# Patient Record
Sex: MALE | Race: WHITE | NOT HISPANIC OR LATINO | Employment: FULL TIME | URBAN - NONMETROPOLITAN AREA
[De-identification: names, ages, dates, MRNs, and addresses within clinical notes are randomized per-mention and may not be internally consistent; named-entity substitution may affect disease eponyms.]

---

## 2020-04-06 ENCOUNTER — CONSULT (OUTPATIENT)
Dept: CARDIOLOGY | Facility: CLINIC | Age: 50
End: 2020-04-06

## 2020-04-06 VITALS
OXYGEN SATURATION: 97 % | DIASTOLIC BLOOD PRESSURE: 100 MMHG | BODY MASS INDEX: 30.61 KG/M2 | WEIGHT: 226 LBS | HEIGHT: 72 IN | SYSTOLIC BLOOD PRESSURE: 146 MMHG | HEART RATE: 68 BPM

## 2020-04-06 DIAGNOSIS — R00.1 BRADYCARDIA, SINUS: ICD-10-CM

## 2020-04-06 DIAGNOSIS — E78.5 DYSLIPIDEMIA: ICD-10-CM

## 2020-04-06 DIAGNOSIS — I10 ESSENTIAL HYPERTENSION: Primary | ICD-10-CM

## 2020-04-06 PROCEDURE — 99243 OFF/OP CNSLTJ NEW/EST LOW 30: CPT | Performed by: INTERNAL MEDICINE

## 2020-04-06 RX ORDER — LISINOPRIL 10 MG/1
TABLET ORAL
COMMUNITY
Start: 2020-01-30 | End: 2022-01-23 | Stop reason: ALTCHOICE

## 2020-04-06 RX ORDER — NIACIN 500 MG
500 TABLET ORAL NIGHTLY
COMMUNITY

## 2020-04-06 RX ORDER — ICOSAPENT ETHYL 1000 MG/1
CAPSULE ORAL
COMMUNITY
Start: 2020-01-19

## 2020-04-06 NOTE — PROGRESS NOTES
Subjective:     Encounter Date:04/06/2020      Patient ID: Nick Hancock is a 49 y.o. male.    Chief Complaint: Bradycardia  HPI  This is a 49-year-old male patient who was noted to have mild asymptomatic resting sinus bradycardia with a pulse rate in the 50s at a recent visit with his primary care provider.  He was referred to cardiology.  The patient has no chest discomfort at rest or with activity.  There is no exertional chest arm neck jaw shoulder or back discomfort.  There is no orthopnea PND or lower extremity edema.  There is no dizziness palpitations or syncope.  He has no history of arrhythmia.  He has a history of hypertension and hypercholesterolemia.  He does not appear to have had recent thyroid function testing.  His family history is negative for premature coronary artery disease.  He has no personal history of diabetes.  He is a non-smoker.  The patient indicates that he had a screening treadmill stress test 2 years ago which was normal.  The following portions of the patient's history were reviewed and updated as appropriate: allergies, current medications, past family history, past medical history, past social history, past surgical history and problem  Review of Systems   Constitution: Negative for chills, diaphoresis, fever, malaise/fatigue, weight gain and weight loss.   HENT: Negative for ear discharge, hearing loss, hoarse voice and nosebleeds.    Eyes: Negative for discharge, double vision, pain and photophobia.   Cardiovascular: Negative for chest pain, claudication, cyanosis, dyspnea on exertion, irregular heartbeat, leg swelling, near-syncope, orthopnea, palpitations, paroxysmal nocturnal dyspnea and syncope.   Respiratory: Negative for cough, hemoptysis, shortness of breath, sputum production and wheezing.    Endocrine: Negative for cold intolerance, heat intolerance, polydipsia, polyphagia and polyuria.   Hematologic/Lymphatic: Negative for adenopathy and bleeding problem. Does not  bruise/bleed easily.   Skin: Negative for color change, flushing, itching and rash.   Musculoskeletal: Negative for muscle cramps, muscle weakness, myalgias and stiffness.   Gastrointestinal: Negative for abdominal pain, diarrhea, hematemesis, hematochezia, nausea and vomiting.   Genitourinary: Negative for dysuria, frequency and nocturia.   Neurological: Negative for focal weakness, loss of balance, numbness, paresthesias and seizures.   Psychiatric/Behavioral: Negative for altered mental status, hallucinations and suicidal ideas.   Allergic/Immunologic: Negative for HIV exposure, hives and persistent infections.           Current Outpatient Medications:   •  lisinopril (PRINIVIL,ZESTRIL) 10 MG tablet, , Disp: , Rfl:   •  niacin 500 MG tablet, Take 500 mg by mouth Every Night., Disp: , Rfl:   •  VASCEPA 1 g capsule capsule, , Disp: , Rfl:     Objective:   Physical Exam   Constitutional: He is oriented to person, place, and time. He appears well-developed and well-nourished. No distress.   HENT:   Head: Normocephalic and atraumatic.   Mouth/Throat: Oropharynx is clear and moist.   Eyes: Pupils are equal, round, and reactive to light. Conjunctivae and EOM are normal. No scleral icterus.   Neck: Normal range of motion. Neck supple. No JVD present. No tracheal deviation present. No thyromegaly present.   Cardiovascular: Normal rate, regular rhythm, S1 normal, S2 normal, normal heart sounds, intact distal pulses and normal pulses. PMI is not displaced. Exam reveals no gallop and no friction rub.   No murmur heard.  Pulmonary/Chest: Effort normal and breath sounds normal. No stridor. No respiratory distress. He has no wheezes. He has no rales.   Abdominal: Soft. Bowel sounds are normal. He exhibits no distension and no mass. There is no tenderness. There is no rebound and no guarding.   Musculoskeletal: Normal range of motion. He exhibits no edema or deformity.   Neurological: He is alert and oriented to person, place,  "and time. He displays normal reflexes. No cranial nerve deficit. Coordination normal.   Skin: Skin is warm and dry. No rash noted. He is not diaphoretic. No erythema.   Psychiatric: He has a normal mood and affect. His behavior is normal. Thought content normal.     Blood pressure 146/100, pulse 68, height 182.9 cm (72\"), weight 103 kg (226 lb), SpO2 97 %.   Lab Review:     Assessment:       1. Essential hypertension  His blood pressure is elevated at today's visit.  The patient indicates that he is somewhat anxious about this evaluation.  He indicates that his blood pressures have been well controlled otherwise.    2. Dyslipidemia  He is on combination icosapent ethyl as well as Niaspan for elevated triglycerides.  His last fasting serum triglycerides were 370 mg/dL.  - Thyroid Panel With TSH; Future    3. Bradycardia, sinus  Resting, asymptomatic, mild sinus bradycardia.  Not currently on any medications that would lead to bradycardia.  - Holter Monitor - 24 Hour  - Thyroid Panel With TSH; Future    Procedures    Plan:     I have recommended a 24-hour Holter monitor as well as thyroid function testing.  No changes in his medication therapy have been made at today's visit.  Further recommendations will be predicated on the results of his outpatient testing.      "

## 2020-04-13 ENCOUNTER — TELEPHONE (OUTPATIENT)
Dept: CARDIOLOGY | Facility: CLINIC | Age: 50
End: 2020-04-13

## 2020-06-02 ENCOUNTER — OFFICE VISIT (OUTPATIENT)
Dept: PULMONOLOGY | Facility: CLINIC | Age: 50
End: 2020-06-02

## 2020-06-02 VITALS
RESPIRATION RATE: 18 BRPM | SYSTOLIC BLOOD PRESSURE: 126 MMHG | HEIGHT: 72 IN | DIASTOLIC BLOOD PRESSURE: 72 MMHG | HEART RATE: 61 BPM | TEMPERATURE: 97.1 F | OXYGEN SATURATION: 98 % | WEIGHT: 218 LBS | BODY MASS INDEX: 29.53 KG/M2

## 2020-06-02 DIAGNOSIS — G47.33 OBSTRUCTIVE SLEEP APNEA: Primary | ICD-10-CM

## 2020-06-02 DIAGNOSIS — G47.19 EXCESSIVE DAYTIME SLEEPINESS: ICD-10-CM

## 2020-06-02 DIAGNOSIS — R06.83 SNORING: ICD-10-CM

## 2020-06-02 PROCEDURE — 99244 OFF/OP CNSLTJ NEW/EST MOD 40: CPT | Performed by: INTERNAL MEDICINE

## 2020-06-02 NOTE — PROGRESS NOTES
CONSULT NOTE    Requested by:   Alicia Devlin PA Dicks, Clarice Y., PA      Chief Complaint   Patient presents with   • Consult   • Sleeping Problem       Subjective:  Nick Hancock is a 49 y.o. male.     History of Present Illness   Patient came in today for evaluation of possible sleep apnea. Patient says that for the past few years he snores loudly and has woken up in the middle of the night gasping for breath and sometimes with a choking sensation. Also, the patient's family notes that he has occasional pauses in the breathing.     he feels that he doesn't get restful night sleep and his quality has diminished considerably. he does feel sleepy watching TV and reading a book.      he is not complaining of occasional headaches. Patient mentions having rare nights when he has acted out his dreams, but hasn't happened in many years.     There is no known family history of sleep apnea     his Epsworth Sleepiness score was 11 /24.     Patient suffers from hypertension.     he drinks 1-2 cups/cans of caffeinated drinks per day.    The following portions of the patient's history were reviewed and updated as appropriate: allergies, current medications, past family history, past medical history, past social history and past surgical history.    Review of Systems   Constitutional: Negative for chills, fatigue and fever.   HENT: Negative for sinus pressure, sneezing and sore throat.    Respiratory: Negative for cough, chest tightness, shortness of breath and wheezing.    Cardiovascular: Negative for palpitations and leg swelling.   Psychiatric/Behavioral: Positive for sleep disturbance.   All other systems reviewed and are negative.      Past Medical History:   Diagnosis Date   • Diverticulitis    • Hyperlipidemia        Social History     Tobacco Use   • Smoking status: Former Smoker     Last attempt to quit: 4/6/2005     Years since quitting: 15.1   • Smokeless tobacco: Current User   Substance Use Topics   •  "Alcohol use: Yes     Comment: Socially         Objective:  Visit Vitals  /72   Pulse 61   Temp 97.1 °F (36.2 °C)   Resp 18   Ht 182.9 cm (72.01\")   Wt 98.9 kg (218 lb)   SpO2 98%   BMI 29.56 kg/m²       Physical Exam   Constitutional: He is oriented to person, place, and time. He appears well-developed and well-nourished.   HENT:   Head: Atraumatic.   Crowded Oropharynx.    Eyes: Pupils are equal, round, and reactive to light. EOM are normal.   Neck: No JVD present. No tracheal deviation present. No thyromegaly present.   Increased adipose tissue.    Cardiovascular: Normal rate and regular rhythm.   Pulmonary/Chest: Effort normal and breath sounds normal. No respiratory distress. He has no wheezes.   Musculoskeletal: Normal range of motion. He exhibits no edema.   Gait was normal.   Neurological: He is alert and oriented to person, place, and time.   Skin: Skin is warm and dry.   Psychiatric: He has a normal mood and affect. His behavior is normal.   Vitals reviewed.      Assessment/Plan:  Nick was seen today for consult and sleeping problem.    Diagnoses and all orders for this visit:    Obstructive sleep apnea  -     Home Sleep Study; Future    Snoring  -     Home Sleep Study; Future    Excessive daytime sleepiness  -     Home Sleep Study; Future        Return in about 4 months (around 10/2/2020) for Alexus/Radha.    DISCUSSION(if any):  Sleep questionnaire was reviewed with the patient.    The pathophysiology of sleep apnea was discussed, with the patient.     We will encourage him to schedule the sleep study soon.     The patient was made aware of the limitation of the home sleep study, whereby it may underestimate the true AHI and also carries a low sensitivity.  I have informed him that even if the home sleep study is negative, we may suggest an in lab sleep study to completely and definitively rule out/in sleep apnea.  The patient has understood.  This was communicated to the patient, in case " home study is to be requested.    The patient is agreeable to try CPAP/BiPAP, if needed.     Patient was educated on good sleep hygiene measures and voiced understanding of the same.     Patient was given reading material regarding sleep apnea.      Dictated utilizing Dragon dictation.    This document was electronically signed by Faiza Gomez MD on 06/02/20 at 12:09

## 2020-06-10 ENCOUNTER — HOSPITAL ENCOUNTER (OUTPATIENT)
Dept: SLEEP MEDICINE | Facility: HOSPITAL | Age: 50
Discharge: HOME | End: 2020-06-10
Admitting: INTERNAL MEDICINE

## 2020-06-10 DIAGNOSIS — R06.83 SNORING: ICD-10-CM

## 2020-06-10 DIAGNOSIS — G47.33 OBSTRUCTIVE SLEEP APNEA: ICD-10-CM

## 2020-06-10 DIAGNOSIS — G47.19 EXCESSIVE DAYTIME SLEEPINESS: ICD-10-CM

## 2020-06-10 PROCEDURE — 95806 SLEEP STUDY UNATT&RESP EFFT: CPT | Performed by: INTERNAL MEDICINE

## 2020-06-10 PROCEDURE — 95806 SLEEP STUDY UNATT&RESP EFFT: CPT

## 2020-06-15 DIAGNOSIS — G47.33 OSA (OBSTRUCTIVE SLEEP APNEA): Primary | ICD-10-CM

## 2020-10-07 ENCOUNTER — OFFICE VISIT (OUTPATIENT)
Dept: PULMONOLOGY | Facility: CLINIC | Age: 50
End: 2020-10-07

## 2020-10-07 VITALS
HEART RATE: 65 BPM | WEIGHT: 224 LBS | TEMPERATURE: 97.3 F | BODY MASS INDEX: 30.34 KG/M2 | SYSTOLIC BLOOD PRESSURE: 140 MMHG | DIASTOLIC BLOOD PRESSURE: 90 MMHG | HEIGHT: 72 IN | RESPIRATION RATE: 18 BRPM | OXYGEN SATURATION: 96 %

## 2020-10-07 DIAGNOSIS — R06.83 SNORING: ICD-10-CM

## 2020-10-07 DIAGNOSIS — G47.33 OSA (OBSTRUCTIVE SLEEP APNEA): Primary | ICD-10-CM

## 2020-10-07 DIAGNOSIS — G47.19 EXCESSIVE DAYTIME SLEEPINESS: ICD-10-CM

## 2020-10-07 PROCEDURE — 99213 OFFICE O/P EST LOW 20 MIN: CPT | Performed by: NURSE PRACTITIONER

## 2020-10-07 NOTE — PROGRESS NOTES
"Chief Complaint   Patient presents with   • Follow-up   • Sleeping Problem         Subjective   Nick Hancock is a 49 y.o. male.     History of Present Illness   The patient comes in today for follow-up of obstructive sleep apnea.    I reviewed his sleep study and discussed the results with him today.  The sleep study revealed moderate sleep apnea with an apnea hypopnea index of 19 per hour.  His apnea-hypopnea index was worse in supine position at 32 per hour.    He has been set up with AutoPAP at a pressure of 6/14.  He has had some difficulty using the machine on a regular basis due to nasal congestion.  He is using an over-the-counter sinus nasal spray which helps but he does not want to use this on a regular basis because it dries his nose out.      The following portions of the patient's history were reviewed and updated as appropriate: allergies, current medications, past family history, past medical history, past social history and past surgical history.      Review of Systems   Constitutional: Negative for chills and fever.   HENT: Positive for rhinorrhea, sinus pressure and sneezing. Negative for sore throat.    Respiratory: Negative for cough, chest tightness, shortness of breath and wheezing.    Psychiatric/Behavioral: Negative for sleep disturbance.       Objective   Visit Vitals  /90   Pulse 65   Temp 97.3 °F (36.3 °C)   Resp 18   Ht 182.9 cm (72\")   Wt 102 kg (224 lb)   SpO2 96%   BMI 30.38 kg/m²         Physical Exam  Vitals signs reviewed.   Constitutional:       Appearance: He is well-developed.   HENT:      Head: Atraumatic.      Mouth/Throat:      Comments: Crowded oropharynx. Mild drainage noted.   Musculoskeletal:      Comments: Gait was normal.   Skin:     General: Skin is warm.   Neurological:      Mental Status: He is alert and oriented to person, place, and time.           Assessment/Plan   Nick was seen today for follow-up and sleeping problem.    Diagnoses and all orders for " this visit:    KIMO (obstructive sleep apnea)    Excessive daytime sleepiness    Snoring           Return in about 4 months (around 2/7/2021) for Recheck, For Dr. Gomez, Sleep ONLY.    DISCUSSION (if any):  Continue treatment with AutoPAP at a pressure of 6/14, with a nasal mask.    Patient's compliance data was reviewed and he is not compliant. We have discussed the importance of using the machine every night for at least 4 hours.     Humidification setup, hose and mask care discussed.    Weight loss advised.      Dictated utilizing Dragon dictation.    This document was electronically signed by GORDY Payan October 7, 2020  16:28 EDT

## 2021-02-10 ENCOUNTER — OFFICE VISIT (OUTPATIENT)
Dept: PULMONOLOGY | Facility: CLINIC | Age: 51
End: 2021-02-10

## 2021-02-10 VITALS
RESPIRATION RATE: 18 BRPM | TEMPERATURE: 97.1 F | DIASTOLIC BLOOD PRESSURE: 74 MMHG | BODY MASS INDEX: 30.88 KG/M2 | SYSTOLIC BLOOD PRESSURE: 128 MMHG | HEART RATE: 67 BPM | HEIGHT: 72 IN | WEIGHT: 228 LBS | OXYGEN SATURATION: 96 %

## 2021-02-10 DIAGNOSIS — G47.33 OSA (OBSTRUCTIVE SLEEP APNEA): Primary | ICD-10-CM

## 2021-02-10 PROCEDURE — 99213 OFFICE O/P EST LOW 20 MIN: CPT | Performed by: INTERNAL MEDICINE

## 2021-02-10 NOTE — PROGRESS NOTES
"  Chief Complaint   Patient presents with   • Follow-up   • Sleeping Problem         Subjective   Nick Hancock is a 50 y.o. male.     History of Present Illness   Patient was evaluated today for follow up of Sleep apnea.     Patient doesn't report any issues with the PAP device. The patient describes no significant issues with his mask either. Patient says that the compliance with the use of the equipment is good.     Patient says that his symptoms of fatigue & daytime sleepiness have been helped greatly with the use of PAP, as prescribed.     The following portions of the patient's history were reviewed and updated as appropriate: allergies, current medications, past family history, past medical history, past social history and past surgical history.    Review of Systems   HENT: Negative for sinus pressure, sneezing and sore throat.    Respiratory: Negative for cough, chest tightness, shortness of breath and wheezing.        Objective   Visit Vitals  /74   Pulse 67   Temp 97.1 °F (36.2 °C)   Resp 18   Ht 182.9 cm (72.01\")   Wt 103 kg (228 lb)   SpO2 96%   BMI 30.92 kg/m²       Physical Exam  Vitals signs reviewed.   Constitutional:       Appearance: He is well-developed.   HENT:      Head: Atraumatic.      Mouth/Throat:      Comments: Oropharynx was crowded.  Musculoskeletal:      Comments: Gait was normal.   Neurological:      Mental Status: He is alert and oriented to person, place, and time.         Assessment/Plan   Diagnoses and all orders for this visit:    1. KIMO (obstructive sleep apnea) (Primary)           Return in about 13 months (around 3/10/2022) for SleepONLY/Radha.    DISCUSSION (if any):  I reviewed the results of last sleep study in detail. I informed him that the apnea hypopnea index was 18 / hr. Supine AHI was 32/hour. This was a home study. This was done in June 2020.    Continue treatment with AutoPAP at a pressure of 6/14, with nasal pillows.    Patient seems to be compliant with PAP " device, based on the available data and his account of improved symptoms.     Compliance data was obtained from the patient's device/DME company and it was reviewed in detail with the patient.    Sleep hygiene measures were discussed in great detail including need to follow a strict bedtime and to avoid electronic devices in bed and close to bedtime.    We also discussed the need to avoid unscheduled naps as that can have adverse effects on sleep efficiency at night.    he was also asked to avoid caffeinated or alcoholic beverages within 4 to 6 hours of bedtime.    The patient was once again reminded to continue using the PAP device regularly, every night for atleast 4 hours.    I spent a total of 22 minutes face to face with this patient. Part of this time was spent in counseling patient about the pathophysiology of underlying disease process, reviewing any available sleep studies, need to use devices (as applicable) on a regular basis and lifestyle modifications that may positively impact patient's health.  Time also includes documentation in electronic health records.        Dictated utilizing Dragon dictation.    This document was electronically signed by Faiza Gomez MD on 02/10/21 at 12:33 EST

## 2021-09-20 ENCOUNTER — APPOINTMENT (OUTPATIENT)
Dept: CT IMAGING | Facility: HOSPITAL | Age: 51
End: 2021-09-20

## 2021-09-20 ENCOUNTER — HOSPITAL ENCOUNTER (EMERGENCY)
Facility: HOSPITAL | Age: 51
Discharge: HOME | End: 2021-09-20
Attending: EMERGENCY MEDICINE | Admitting: EMERGENCY MEDICINE

## 2021-09-20 VITALS
HEART RATE: 74 BPM | DIASTOLIC BLOOD PRESSURE: 92 MMHG | RESPIRATION RATE: 16 BRPM | TEMPERATURE: 98.9 F | WEIGHT: 223 LBS | BODY MASS INDEX: 30.2 KG/M2 | SYSTOLIC BLOOD PRESSURE: 145 MMHG | HEIGHT: 72 IN | OXYGEN SATURATION: 99 %

## 2021-09-20 DIAGNOSIS — K57.92 DIVERTICULITIS: Primary | ICD-10-CM

## 2021-09-20 LAB
ALBUMIN SERPL-MCNC: 4.7 G/DL (ref 3.5–5.2)
ALBUMIN/GLOB SERPL: 1.6 G/DL
ALP SERPL-CCNC: 76 U/L (ref 39–117)
ALT SERPL W P-5'-P-CCNC: 19 U/L (ref 1–41)
ANION GAP SERPL CALCULATED.3IONS-SCNC: 11.2 MMOL/L (ref 5–15)
AST SERPL-CCNC: 20 U/L (ref 1–40)
BACTERIA UR QL AUTO: ABNORMAL /HPF
BASOPHILS # BLD AUTO: 0.06 10*3/MM3 (ref 0–0.2)
BASOPHILS NFR BLD AUTO: 0.4 % (ref 0–1.5)
BILIRUB SERPL-MCNC: 2.4 MG/DL (ref 0–1.2)
BILIRUB UR QL STRIP: NEGATIVE
BUN SERPL-MCNC: 16 MG/DL (ref 6–20)
BUN/CREAT SERPL: 16.2 (ref 7–25)
CALCIUM SPEC-SCNC: 9.4 MG/DL (ref 8.6–10.5)
CHLORIDE SERPL-SCNC: 99 MMOL/L (ref 98–107)
CLARITY UR: CLEAR
CO2 SERPL-SCNC: 26.8 MMOL/L (ref 22–29)
COLOR UR: YELLOW
CREAT SERPL-MCNC: 0.99 MG/DL (ref 0.76–1.27)
CRP SERPL-MCNC: 11.03 MG/DL (ref 0–0.5)
DEPRECATED RDW RBC AUTO: 44.2 FL (ref 37–54)
EOSINOPHIL # BLD AUTO: 0.14 10*3/MM3 (ref 0–0.4)
EOSINOPHIL NFR BLD AUTO: 1 % (ref 0.3–6.2)
ERYTHROCYTE [DISTWIDTH] IN BLOOD BY AUTOMATED COUNT: 13.4 % (ref 12.3–15.4)
ERYTHROCYTE [SEDIMENTATION RATE] IN BLOOD: 29 MM/HR (ref 0–15)
GFR SERPL CREATININE-BSD FRML MDRD: 79 ML/MIN/1.73
GLOBULIN UR ELPH-MCNC: 2.9 GM/DL
GLUCOSE SERPL-MCNC: 98 MG/DL (ref 65–99)
GLUCOSE UR STRIP-MCNC: NEGATIVE MG/DL
HCT VFR BLD AUTO: 41.2 % (ref 37.5–51)
HGB BLD-MCNC: 14.2 G/DL (ref 13–17.7)
HGB UR QL STRIP.AUTO: ABNORMAL
HYALINE CASTS UR QL AUTO: ABNORMAL /LPF
IMM GRANULOCYTES # BLD AUTO: 0.04 10*3/MM3 (ref 0–0.05)
IMM GRANULOCYTES NFR BLD AUTO: 0.3 % (ref 0–0.5)
KETONES UR QL STRIP: NEGATIVE
LEUKOCYTE ESTERASE UR QL STRIP.AUTO: NEGATIVE
LIPASE SERPL-CCNC: 31 U/L (ref 13–60)
LYMPHOCYTES # BLD AUTO: 2.11 10*3/MM3 (ref 0.7–3.1)
LYMPHOCYTES NFR BLD AUTO: 15.6 % (ref 19.6–45.3)
MCH RBC QN AUTO: 30.7 PG (ref 26.6–33)
MCHC RBC AUTO-ENTMCNC: 34.5 G/DL (ref 31.5–35.7)
MCV RBC AUTO: 89.2 FL (ref 79–97)
MONOCYTES # BLD AUTO: 1.16 10*3/MM3 (ref 0.1–0.9)
MONOCYTES NFR BLD AUTO: 8.6 % (ref 5–12)
NEUTROPHILS NFR BLD AUTO: 10.04 10*3/MM3 (ref 1.7–7)
NEUTROPHILS NFR BLD AUTO: 74.1 % (ref 42.7–76)
NITRITE UR QL STRIP: NEGATIVE
NRBC BLD AUTO-RTO: 0 /100 WBC (ref 0–0.2)
PH UR STRIP.AUTO: <=5 [PH] (ref 5–8)
PLATELET # BLD AUTO: 223 10*3/MM3 (ref 140–450)
PMV BLD AUTO: 10.3 FL (ref 6–12)
POTASSIUM SERPL-SCNC: 3.7 MMOL/L (ref 3.5–5.2)
PROT SERPL-MCNC: 7.6 G/DL (ref 6–8.5)
PROT UR QL STRIP: ABNORMAL
RBC # BLD AUTO: 4.62 10*6/MM3 (ref 4.14–5.8)
RBC # UR: ABNORMAL /HPF
REF LAB TEST METHOD: ABNORMAL
SODIUM SERPL-SCNC: 137 MMOL/L (ref 136–145)
SP GR UR STRIP: 1.02 (ref 1–1.03)
SQUAMOUS #/AREA URNS HPF: ABNORMAL /HPF
UROBILINOGEN UR QL STRIP: ABNORMAL
WBC # BLD AUTO: 13.55 10*3/MM3 (ref 3.4–10.8)
WBC UR QL AUTO: ABNORMAL /HPF

## 2021-09-20 PROCEDURE — 96374 THER/PROPH/DIAG INJ IV PUSH: CPT

## 2021-09-20 PROCEDURE — 25010000002 KETOROLAC TROMETHAMINE PER 15 MG: Performed by: PHYSICIAN ASSISTANT

## 2021-09-20 PROCEDURE — 25010000002 IOPAMIDOL 61 % SOLUTION: Performed by: EMERGENCY MEDICINE

## 2021-09-20 PROCEDURE — 85651 RBC SED RATE NONAUTOMATED: CPT | Performed by: PHYSICIAN ASSISTANT

## 2021-09-20 PROCEDURE — 74177 CT ABD & PELVIS W/CONTRAST: CPT

## 2021-09-20 PROCEDURE — 96361 HYDRATE IV INFUSION ADD-ON: CPT

## 2021-09-20 PROCEDURE — 86140 C-REACTIVE PROTEIN: CPT | Performed by: PHYSICIAN ASSISTANT

## 2021-09-20 PROCEDURE — 83690 ASSAY OF LIPASE: CPT | Performed by: PHYSICIAN ASSISTANT

## 2021-09-20 PROCEDURE — 96376 TX/PRO/DX INJ SAME DRUG ADON: CPT

## 2021-09-20 PROCEDURE — 85025 COMPLETE CBC W/AUTO DIFF WBC: CPT | Performed by: PHYSICIAN ASSISTANT

## 2021-09-20 PROCEDURE — 25010000002 MORPHINE PER 10 MG: Performed by: EMERGENCY MEDICINE

## 2021-09-20 PROCEDURE — 80053 COMPREHEN METABOLIC PANEL: CPT | Performed by: PHYSICIAN ASSISTANT

## 2021-09-20 PROCEDURE — 99283 EMERGENCY DEPT VISIT LOW MDM: CPT

## 2021-09-20 PROCEDURE — 81001 URINALYSIS AUTO W/SCOPE: CPT | Performed by: PHYSICIAN ASSISTANT

## 2021-09-20 PROCEDURE — 96375 TX/PRO/DX INJ NEW DRUG ADDON: CPT

## 2021-09-20 RX ORDER — AMOXICILLIN AND CLAVULANATE POTASSIUM 875; 125 MG/1; MG/1
1 TABLET, FILM COATED ORAL 2 TIMES DAILY
Qty: 20 TABLET | Refills: 0 | Status: SHIPPED | OUTPATIENT
Start: 2021-09-20 | End: 2021-09-30

## 2021-09-20 RX ORDER — AMOXICILLIN AND CLAVULANATE POTASSIUM 875; 125 MG/1; MG/1
1 TABLET, FILM COATED ORAL ONCE
Status: COMPLETED | OUTPATIENT
Start: 2021-09-20 | End: 2021-09-20

## 2021-09-20 RX ORDER — ONDANSETRON 4 MG/1
4 TABLET, ORALLY DISINTEGRATING ORAL EVERY 8 HOURS PRN
Qty: 9 TABLET | Refills: 0 | Status: SHIPPED | OUTPATIENT
Start: 2021-09-20 | End: 2021-09-23

## 2021-09-20 RX ORDER — KETOROLAC TROMETHAMINE 30 MG/ML
30 INJECTION, SOLUTION INTRAMUSCULAR; INTRAVENOUS EVERY 6 HOURS PRN
Status: DISCONTINUED | OUTPATIENT
Start: 2021-09-20 | End: 2021-09-20 | Stop reason: HOSPADM

## 2021-09-20 RX ORDER — HYDROCODONE BITARTRATE AND ACETAMINOPHEN 5; 325 MG/1; MG/1
1 TABLET ORAL EVERY 6 HOURS PRN
Qty: 10 TABLET | Refills: 0 | OUTPATIENT
Start: 2021-09-20 | End: 2022-01-23

## 2021-09-20 RX ORDER — MORPHINE SULFATE 4 MG/ML
4 INJECTION, SOLUTION INTRAMUSCULAR; INTRAVENOUS ONCE
Status: COMPLETED | OUTPATIENT
Start: 2021-09-20 | End: 2021-09-20

## 2021-09-20 RX ADMIN — MORPHINE SULFATE 4 MG: 4 INJECTION, SOLUTION INTRAMUSCULAR; INTRAVENOUS at 21:23

## 2021-09-20 RX ADMIN — IOPAMIDOL 100 ML: 612 INJECTION, SOLUTION INTRAVENOUS at 19:28

## 2021-09-20 RX ADMIN — AMOXICILLIN AND CLAVULANATE POTASSIUM 1 TABLET: 875; 125 TABLET, FILM COATED ORAL at 21:22

## 2021-09-20 RX ADMIN — KETOROLAC TROMETHAMINE 30 MG: 30 INJECTION, SOLUTION INTRAMUSCULAR at 19:07

## 2021-09-20 RX ADMIN — MORPHINE SULFATE 4 MG: 4 INJECTION, SOLUTION INTRAMUSCULAR; INTRAVENOUS at 19:55

## 2021-09-20 RX ADMIN — SODIUM CHLORIDE 1000 ML: 9 INJECTION, SOLUTION INTRAVENOUS at 19:08

## 2021-09-20 NOTE — ED NOTES
MEDICAL SCREENING:    Reason for Visit: Left lower quadrant abdominal pain, hx of diverticulitis.     Patient initially seen in triage.  The patient was advised further evaluation and diagnostic testing will be needed, some of the treatment and testing will be initiated in the lobby in order to begin the process.  The patient will be returned to the waiting area for the time being and possibly be re-assessed by a subsequent ED provider.  The patient will be brought back to the treatment area in as timely manner as possible.       Michele Lanier PA-C  09/20/21 1828

## 2021-09-20 NOTE — ED PROVIDER NOTES
Subjective   Patient is a 50-year-old male with a history of diverticulitis, hypertension, and hyperlipidemia who presents to the ED with 1 day of left lower quadrant pain.  Started insidiously yesterday and has progressively worsened since that time.  It is worse with movement and walking.  He is only able to get comfortable when he sits very still.  He took some aspirin with his lunch which did temporarily help his pain but returned shortly thereafter and this prompted his presentation to the ER.  His last flare of diverticulitis was 1 to 2 years ago and he was treated as an outpatient with antibiotics.  He denies any fever, diarrhea, bloody stools, nausea, or vomiting.  He denies any dysuria or hematuria.          Review of Systems   Constitutional: Negative.    HENT: Negative.    Eyes: Negative.    Respiratory: Negative.    Cardiovascular: Negative.    Gastrointestinal: Positive for abdominal pain.   Endocrine: Negative.    Genitourinary: Negative.    Musculoskeletal: Negative.    Skin: Negative.    Allergic/Immunologic: Negative.    Neurological: Negative.    Hematological: Negative.    Psychiatric/Behavioral: Negative.        Past Medical History:   Diagnosis Date   • Diverticulitis    • Hyperlipidemia    • Hypertension        No Known Allergies    Past Surgical History:   Procedure Laterality Date   • EYE SURGERY     • SKIN SURGERY      removed abnormal cells       Family History   Problem Relation Age of Onset   • Cancer Mother    • Heart attack Mother    • Hyperlipidemia Father    • Stroke Father    • Hypertension Brother        Social History     Socioeconomic History   • Marital status: Unknown     Spouse name: Not on file   • Number of children: Not on file   • Years of education: Not on file   • Highest education level: Not on file   Tobacco Use   • Smoking status: Former Smoker     Quit date: 2005     Years since quittin.4   • Smokeless tobacco: Current User     Types: Snuff   Vaping Use    • Vaping Use: Never used   Substance and Sexual Activity   • Alcohol use: Yes     Comment: Socially   • Drug use: Never   • Sexual activity: Defer           Objective   Physical Exam  Constitutional:       Appearance: Normal appearance.   HENT:      Head: Normocephalic.      Right Ear: External ear normal.      Left Ear: External ear normal.      Nose: Nose normal.      Mouth/Throat:      Mouth: Mucous membranes are moist.   Eyes:      Extraocular Movements: Extraocular movements intact.   Cardiovascular:      Rate and Rhythm: Normal rate.   Pulmonary:      Effort: Pulmonary effort is normal.   Abdominal:      General: Abdomen is flat.      Palpations: Abdomen is soft.      Tenderness: There is abdominal tenderness in the left lower quadrant. There is guarding.      Hernia: No hernia is present.   Musculoskeletal:         General: Normal range of motion.      Cervical back: Normal range of motion.   Skin:     General: Skin is dry.   Neurological:      General: No focal deficit present.      Mental Status: He is alert and oriented to person, place, and time.   Psychiatric:         Mood and Affect: Mood normal.         Behavior: Behavior normal.         Procedures           ED Course                                           MDM  Number of Diagnoses or Management Options  Diverticulitis  Diagnosis management comments: Patient is a 50-year-old male with a history of diverticulitis, hypertension, and hyperlipidemia who presents to the ED with 1 day of insidious onset and now worsening left lower quadrant pain.  On arrival, he is hypertensive at 190/114 but afebrile.  He has tenderness to palpation in the left lower quadrant with voluntary guarding.  Differential considered included diverticulitis including complicated cases with abscess and perforation, constipation, and to a lesser extent urological causes of pain.  Work-up included basic labs, inflammatory markers, urinalysis, and CT abdomen pelvis with IV  contrast.  While in the ED, he received a fluid bolus and IV Toradol without significant improvement on reevaluation.  He was given IV morphine.  Labs and imaging independently reviewed and were pertinent for mild leukocytosis and a CT scan that showed acute diverticulitis without perforation or abscess.  On reevaluation, patient was asleep, resting comfortably, and was requesting to go home.  Given that he is tolerating oral intake and pain is well controlled at this time, I feel this is reasonable.  He will be started on Augmentin with his first dose given in the ED and prescribed Norco after Rafa checked.  He was given return precautions and verbalized understanding.      Final diagnoses:   Diverticulitis       ED Disposition  ED Disposition     ED Disposition Condition Comment    Discharge Stable           Alicia Devlin, PA  [address]  [address]  [place] [id number] [phone]               Medication List      New Prescriptions    amoxicillin-clavulanate 875-125 MG per tablet  Commonly known as: AUGMENTIN  Take 1 tablet by mouth 2 (Two) Times a Day for 10 days.     HYDROcodone-acetaminophen 5-325 MG per tablet  Commonly known as: NORCO  Take 1 tablet by mouth Every 6 (Six) Hours As Needed for Severe Pain .     ondansetron ODT 4 MG disintegrating tablet  Commonly known as: ZOFRAN-ODT  Place 1 tablet on the tongue Every 8 (Eight) Hours As Needed for Nausea for up to 3 days.           Where to Get Your Medications      These medications were sent to [facility] [place], [place] - [address] - [phone]  - [phone] FX  [address], [facility] [place]note 2[place] [address]    Phone: [phone]   · amoxicillin-clavulanate 875-125 MG per tablet  · HYDROcodone-acetaminophen 5-325 MG per tablet  · ondansetron ODT 4 MG disintegrating tablet          Kaela Hernandez PA  09/20/21 2317

## 2021-09-20 NOTE — ED NOTES
MEDICAL SCREENING:    Reason for Visit: Left lower quadrant abdominal pain      Patient initially seen in triage.  The patient was advised further evaluation and diagnostic testing will be needed, some of the treatment and testing will be initiated in the lobby in order to begin the process.  The patient will be returned to the waiting area for the time being and possibly be re-assessed by a subsequent ED provider.  The patient will be brought back to the treatment area in as timely manner as possible.       Michele Lanier PA-C  09/20/21 1827

## 2021-09-21 NOTE — DISCHARGE INSTRUCTIONS
Take the Augmentin twice a day for the next 10 days.  Take the Norco for pain with ibuprofen in between doses.  Take the Zofran as needed for nausea.  Drink plenty of fluids and eat a bland diet.  Return to the ED if you develop worsening pain, fever, or bloody stools.

## 2022-01-23 PROCEDURE — U0004 COV-19 TEST NON-CDC HGH THRU: HCPCS | Performed by: PHYSICIAN ASSISTANT

## 2022-03-10 ENCOUNTER — OFFICE VISIT (OUTPATIENT)
Dept: PULMONOLOGY | Facility: CLINIC | Age: 52
End: 2022-03-10

## 2022-03-10 VITALS
WEIGHT: 228.3 LBS | HEART RATE: 65 BPM | SYSTOLIC BLOOD PRESSURE: 132 MMHG | OXYGEN SATURATION: 97 % | BODY MASS INDEX: 30.92 KG/M2 | HEIGHT: 72 IN | DIASTOLIC BLOOD PRESSURE: 84 MMHG

## 2022-03-10 DIAGNOSIS — G47.19 EXCESSIVE DAYTIME SLEEPINESS: ICD-10-CM

## 2022-03-10 DIAGNOSIS — G47.33 OSA (OBSTRUCTIVE SLEEP APNEA): Primary | ICD-10-CM

## 2022-03-10 PROCEDURE — 99212 OFFICE O/P EST SF 10 MIN: CPT | Performed by: NURSE PRACTITIONER

## 2022-03-10 NOTE — PROGRESS NOTES
"Chief Complaint   Patient presents with   • Follow-up   • Sleep Apnea         Subjective   Nick Hancock is a 51 y.o. male.     History of Present Illness   Patient comes back today for follow up of Obstructive Sleep apnea.     Patient says that he is compliant with his device and using it regularly.    Patient's symptoms of sleep disturbance and daytime sleepiness have been helped greatly with the use of PAP device, as prescribed.     He machine has been recalled and he has registered it and is waiting for a new machine.     The following portions of the patient's history were reviewed and updated as appropriate: allergies, current medications, past family history, past medical history, past social history and past surgical history.      Review of Systems   HENT: Negative for congestion and sore throat.    Respiratory: Negative for cough, chest tightness, shortness of breath and wheezing.        Objective   Visit Vitals  /84 (BP Location: Left arm, Patient Position: Sitting, Cuff Size: Adult)   Pulse 65   Ht 182.9 cm (72\")   Wt 104 kg (228 lb 4.8 oz)   SpO2 97%   BMI 30.96 kg/m²         Physical Exam  Vitals reviewed.   Constitutional:       Appearance: He is well-developed.   HENT:      Head: Atraumatic.      Mouth/Throat:      Comments: Crowded oropharynx.  Eyes:      Extraocular Movements: Extraocular movements intact.   Musculoskeletal:      Comments: Gait normal.   Skin:     General: Skin is warm.   Neurological:      Mental Status: He is alert and oriented to person, place, and time.           Assessment/Plan   Diagnoses and all orders for this visit:    1. KIMO (obstructive sleep apnea) (Primary)    2. Excessive daytime sleepiness           Return in about 1 year (around 3/10/2023) for Recheck, For Dr. Gomez.    DISCUSSION (if any):  I reviewed the results of last sleep study in detail. I informed him that the apnea hypopnea index was 18 / hr. Supine AHI was 32/hour. This was a home study. This was " done in June 2020.    Continue treatment with AutoPAP at a pressure of 6/14, with a nasal mask.    Patient's compliance data was reviewed and the compliance is greater than 80%.    Humidification setup, hose and mask care discussed.    Weight loss advised.    Use every night for at least 4 hours stressed.    He has registered his machine. I have suggested using an inline filter with the current machine.    Dictated utilizing Dragon dictation.    This document was electronically signed by GORDY Payan March 10, 2022  13:30 EST

## 2022-08-23 ENCOUNTER — OFFICE VISIT (OUTPATIENT)
Dept: GASTROENTEROLOGY | Facility: CLINIC | Age: 52
End: 2022-08-23

## 2022-08-23 VITALS
WEIGHT: 217 LBS | HEART RATE: 59 BPM | BODY MASS INDEX: 29.39 KG/M2 | TEMPERATURE: 97.3 F | SYSTOLIC BLOOD PRESSURE: 164 MMHG | DIASTOLIC BLOOD PRESSURE: 110 MMHG | HEIGHT: 72 IN

## 2022-08-23 DIAGNOSIS — E78.5 DYSLIPIDEMIA: ICD-10-CM

## 2022-08-23 DIAGNOSIS — Z12.12 ENCOUNTER FOR COLORECTAL CANCER SCREENING: ICD-10-CM

## 2022-08-23 DIAGNOSIS — Z87.19 HISTORY OF DIVERTICULITIS: Primary | ICD-10-CM

## 2022-08-23 DIAGNOSIS — Z12.11 ENCOUNTER FOR COLORECTAL CANCER SCREENING: ICD-10-CM

## 2022-08-23 DIAGNOSIS — R17 ELEVATED BILIRUBIN: ICD-10-CM

## 2022-08-23 PROCEDURE — 99244 OFF/OP CNSLTJ NEW/EST MOD 40: CPT | Performed by: PHYSICIAN ASSISTANT

## 2022-08-23 RX ORDER — SODIUM, POTASSIUM,MAG SULFATES 17.5-3.13G
SOLUTION, RECONSTITUTED, ORAL ORAL
Qty: 354 ML | Refills: 0 | Status: SHIPPED | OUTPATIENT
Start: 2022-08-23 | End: 2022-10-10

## 2022-08-23 RX ORDER — MULTIPLE VITAMINS W/ MINERALS TAB 9MG-400MCG
1 TAB ORAL DAILY
Status: ON HOLD | COMMUNITY
End: 2022-09-26 | Stop reason: SDUPTHER

## 2022-08-23 RX ORDER — SODIUM CHLORIDE 9 MG/ML
30 INJECTION, SOLUTION INTRAVENOUS CONTINUOUS PRN
Status: CANCELLED | OUTPATIENT
Start: 2022-08-23

## 2022-08-23 NOTE — PATIENT INSTRUCTIONS
Gilbert's syndrome likely based on bilirubin elevated      Fiber Foods  It is recommended that you consume 25-45 grams daily.    Fresh & Dried Fruit  Serving Size Fiber (g)    Apples with skin  1 medium 5.0    Apricot  3 medium 1.0    Apricots, dried  4 pieces 2.9    Banana  1 medium 3.9    Blueberries  1 cup 4.2    Cantaloupe, cubes  1 cup 1.3    Figs, dried  2 medium 3.7    Grapefruit  1/2 medium 3.1    Orange, navel  1 medium 3.4    Peach  1 medium 2.0    Peaches, dried  3 pieces 3.2    Pear  1 medium 5.1    Plum  1 medium 1.1    Raisins  1.5 oz box 1.6    Raspberries  1 cup 6.4    Strawberries  1 cup 4.4      Grains, Beans, Nuts & Seeds  Serving Size Fiber (g)    Almonds  1 oz 4.2    Black beans, cooked  1 cup 13.9    Bran cereal  1 cup 19.9    Bread, whole wheat  1 slice 2.0    Brown rice, dry  1 cup 7.9    Cashews  1 oz 1.0    Flax seeds  3 Tbsp. 6.9    Garbanzo beans, cooked  1 cup 5.8    Kidney beans, cooked  1 cup 11.6    Lentils, red cooked  1 cup 13.6    Lima beans, cooked  1 cup 8.6    Oats, rolled dry  1 cup 12.0    Quinoa (seeds) dry  1/4 cup 6.2    Quinoa, cooked  1 cup 8.4    Pasta, whole wheat  1 cup 6.3    Peanuts  1 oz 2.3    Pistachio nuts  1 oz 3.1    Pumpkin seeds  1/4 cup 4.1    Soybeans, cooked  1 cup 8.6    Sunflower seeds  1/4 cup 3.0    Walnuts  1 oz 3.1            Vegetables  Serving Size Fiber (g)    Avocado (fruit)  1 medium 11.8    Beets, cooked  1 cup 2.8    Beet greens  1 cup 4.2    Bok payton, cooked  1 cup 2.8    Broccoli, cooked  1 cup 4.5    [place] sprouts, cooked  1 cup 3.6    Cabbage, cooked  1 cup 4.2    Carrot  1 medium 2.6    Carrot, cooked  1 cup 5.2    Cauliflower, cooked  1 cup 3.4    Mango slaw  1 cup 4.0    Drake greens, cooked  1 cup 2.6    Corn, sweet  1 cup 4.6    Green beans  1 cup 4.0    Celery  1 stalk 1.1    Kale, cooked  1 cup 7.2    Onions, raw  1 cup 2.9    Peas, cooked  1 cup 8.8    Peppers, sweet  1 cup 2.6    Pop corn, air-popped  3 cups 3.6    Potato,  baked w/ skin  1 medium 4.8    Spinach, cooked  1 cup 4.3    Summer squash, cooked  1 cup 2.5    Sweet potato, cooked  1 medium 4.9    Swiss chard, cooked  1 cup 3.7    Tomato  1 medium 1.0    Winter squash, cooked  1 cup 6.2    Zucchini, cooked  1 cup 2.6

## 2022-08-23 NOTE — PROGRESS NOTES
New Patient Consult      Date: 2022   Patient Name: Nick Hancock  MRN: [id number]  : 1970     Primary Care Provider: Alicia Devlin PA  Referring Provider: Claus    Chief Complaint   Patient presents with   • Colon Cancer Screening     History of Present Illness: Nick Hancock is a 51 y.o. male who is here today as a consultation with Gastroenterology for evaluation of Colon Cancer Screening.     His only previous colonoscopy was in  and reported as normal. He has history of diverticulitis, last episode in  of . He had his first episode of diverticulitis in year  and reports 3 episodes since then over the years. No current abdominal pain. No rectal bleeding. BMs are occurring daily. He does eat a high fiber diet and drinks a large amount of water. He is active and exercises regularly. There is no known family history of colon cancer or colon polyps.    No history of liver disease. He has had high cholesterol for several years. He drinks alcohol only occassionally, no history of alcoholism. Labs have shown elevated bilirubin in the past no diagnosis reported.     Subjective      Past Medical History:   Diagnosis Date   • Diverticulitis    • High triglycerides    • Hyperlipidemia    • Hypertension    • Sleep apnea    • Tattoos      Past Surgical History:   Procedure Laterality Date   • COLONOSCOPY     • EYE SURGERY Bilateral     PRK   • SKIN SURGERY      removed abnormal cells     Family History   Problem Relation Age of Onset   • Cancer Mother    • Heart attack Mother    • Hyperlipidemia Father    • Stroke Father    • Hypertension Brother    • Colon cancer Neg Hx      Social History     Socioeconomic History   • Marital status:    Tobacco Use   • Smoking status: Former Smoker     Quit date: 2005     Years since quittin.3   • Smokeless tobacco: Current User     Types: Snuff   Vaping Use   • Vaping Use: Never used   Substance and Sexual Activity   •  Alcohol use: Yes     Comment: Socially   • Drug use: Never   • Sexual activity: Defer     Current Outpatient Medications:   •  lisinopril (PRINIVIL,ZESTRIL) 20 MG tablet, Take 20 mg by mouth See Admin Instructions., Disp: , Rfl:   •  multivitamin with minerals tablet tablet, Take 1 tablet by mouth Daily., Disp: , Rfl:   •  niacin 500 MG tablet, Take 500 mg by mouth Every Night., Disp: , Rfl:   •  VASCEPA 1 g capsule capsule, , Disp: , Rfl:     No Known Allergies    The following portions of the patient's history were reviewed and updated as appropriate: allergies, current medications, past family history, past medical history, past social history, past surgical history and problem list.    Objective     Physical Exam  Vitals reviewed.   Constitutional:       General: He is not in acute distress.     Appearance: Normal appearance. He is well-developed. He is not ill-appearing or diaphoretic.   HENT:      Head: Normocephalic and atraumatic.      Right Ear: External ear normal.      Left Ear: External ear normal.      Nose: Nose normal.      Mouth/Throat:      Comments: Wearing a mask  Eyes:      General: No scleral icterus.        Right eye: No discharge.         Left eye: No discharge.      Conjunctiva/sclera: Conjunctivae normal.   Neck:      Vascular: No JVD.   Cardiovascular:      Rate and Rhythm: Normal rate and regular rhythm.      Heart sounds: Normal heart sounds. No murmur heard.    No friction rub. No gallop.   Pulmonary:      Effort: Pulmonary effort is normal. No respiratory distress.      Breath sounds: Normal breath sounds. No wheezing or rales.   Chest:      Chest wall: No tenderness.   Abdominal:      General: Bowel sounds are normal. There is no distension.      Palpations: Abdomen is soft. There is no mass.      Tenderness: There is no abdominal tenderness. There is no guarding.   Musculoskeletal:         General: No deformity. Normal range of motion.      Cervical back: Normal range of motion.  "  Skin:     General: Skin is warm and dry.      Findings: No erythema or rash.   Neurological:      Mental Status: He is alert and oriented to person, place, and time.      Coordination: Coordination normal.   Psychiatric:         Mood and Affect: Mood normal.         Behavior: Behavior normal.         Thought Content: Thought content normal.         Judgment: Judgment normal.         Vitals:    08/23/22 0953   BP: (!) 164/110   Pulse: 59   Temp: 97.3 °F (36.3 °C)   TempSrc: Infrared   Weight: 98.4 kg (217 lb)   Height: 182.9 cm (72\")     Results Review:   I have reviewed the patient's new clinical and imaging results.    CTAP with contrast 9/2021:  FINDINGS:  LOWER CHEST: The heart is normal size.  The lung bases are  clear.  ABDOMEN/PELVIS:  Liver, gallbladder and bile ducts: The  liver enhances homogeneously without suspicious focal hepatic  lesion.  The gallbladder is unremarkable.  There is no definite  biliary duct dilatation.  Adrenal glands: The adrenal glands are  morphologically unremarkable without suspicious lesion.  Kidneys, ureter and urinary bladder: No suspicious renal lesion.  No hydronephrosis.  Urinary bladder is unremarkable.  Spleen:  The spleen is normal size.  Pancreas: The pancreas is grossly  unremarkable.  GI systems and mesentery: No evidence of bowel  obstruction.  The appendix is visualized and unremarkable in  appearance.  There is colonic diverticulosis with significant  colonic wall thickening and surrounding inflammation involving  the distal descending colon.  Lymph nodes: No definite  pathologically enlarged abdominal or pelvic lymph nodes present.  Vessels: The aorta and abdominal arteries are grossly patent.  The IVC and portal vein are patent and grossly unremarkable.  Peritoneum: No free intraperitoneal fluid or pneumoperitoneum.  Pelvic viscera: No acute findings.  Body wall: No body wall  contusion. No significant body wall hernias.  Bones: No " acute  fracture.  IMPRESSION:  Findings are compatible with acute diverticulitis involving the  distal descending colon.    Labs 9/2021: AST/ALT normal, bilirubin 2.4, alk phos normal, CBC normal other than incr WBC at 13.55.     Labs 3/2022: Bili 2.1, alk phos 52, ALT 22, AST 23, GFR 83, Cr 1.03, Hgb 15, HCT 4.2, MCV 90.4, Platelets 265,000, Tchol 160, , LDL 99.    Assessment / Plan      1. History of diverticulitis  2. Encounter for colorectal cancer screening  His last colonoscopy was in 2010 and reported as normal. He does have known history of diverticulitis (reports 4 episodes total since 2000). He has a high fiber diet. No current abdominal pain. Needs colonoscopy arranged for screening.    Continue High fiber diet  Colonoscopy will be arranged    He will have a colonoscopy performed with monitored anesthesia care. The indications, technique, alternatives and potential risk and complications were discussed with the patient including but not limited to bleeding, bowel perforations, missing lesions and anesthetic complications. The patient understands and wishes to proceed with the procedure and has given their verbal consent. Written patient education information was given to the patient. He should follow up in the office after this procedure to discuss the results and further recommendations can be made at that time. The patient will call if they have further questions before procedure.  - Case Request  - sodium-potassium-magnesium sulfates (SUPREP) 17.5-3.13-1.6 GM/177ML solution oral solution; Take 2 bottles by mouth 1 (One) Time for 1 dose. Please see prep instructions from office  Dispense: 354 mL; Refill: 0    3. Dyslipidemia  4. Elevated bilirubin  He has long history of elevated cholesterol and is on current treatment with niacin and Vascepa. His BMI is 29 but he is very physically fit and active. He is a nonalcoholic. No prior history of any liver disease. Labs 9/2021 and 3/2022 show isolate  elevated total bilirubin. This appears most consistent with Gilbert's syndrome.     Continue to work on lowering cholesterol  Maintain normal weight  Labs at PCPs office within next couple of weeks  More recommendations will be made based on that result    - Bilirubin, Total & Direct; Future            Follow Up:   Return for follow up after procedure to discuss results.      Deana Kiser PA-C  [facility]  8/23/2022  10:57 EDT    Dictated Utilizing Dragon Dictation: Part of this note may be an electronic transcription/translation of spoken language to printed text using the Dragon Dictation System.

## 2022-08-29 PROBLEM — Z12.12 ENCOUNTER FOR COLORECTAL CANCER SCREENING: Status: ACTIVE | Noted: 2022-01-01

## 2022-08-29 PROBLEM — Z12.11 ENCOUNTER FOR COLORECTAL CANCER SCREENING: Status: ACTIVE | Noted: 2022-08-29

## 2022-09-17 ENCOUNTER — APPOINTMENT (OUTPATIENT)
Dept: GENERAL RADIOLOGY | Facility: HOSPITAL | Age: 52
End: 2022-09-17

## 2022-09-17 ENCOUNTER — APPOINTMENT (OUTPATIENT)
Dept: MRI IMAGING | Facility: HOSPITAL | Age: 52
End: 2022-09-17

## 2022-09-17 ENCOUNTER — APPOINTMENT (OUTPATIENT)
Dept: CT IMAGING | Facility: HOSPITAL | Age: 52
End: 2022-09-17

## 2022-09-17 ENCOUNTER — HOSPITAL ENCOUNTER (INPATIENT)
Facility: HOSPITAL | Age: 52
LOS: 9 days | Discharge: HOME HEALTH | End: 2022-09-26
Attending: EMERGENCY MEDICINE

## 2022-09-17 DIAGNOSIS — R26.9 GAIT DIFFICULTY: ICD-10-CM

## 2022-09-17 DIAGNOSIS — D33.0 BENIGN NEOPLASM OF SUPRATENTORIAL REGION OF BRAIN: ICD-10-CM

## 2022-09-17 DIAGNOSIS — R47.9 DIFFICULTY WITH SPEECH: ICD-10-CM

## 2022-09-17 DIAGNOSIS — G93.89 BRAIN MASS: Primary | ICD-10-CM

## 2022-09-17 LAB
ALBUMIN SERPL-MCNC: 4.6 G/DL (ref 3.5–5.2)
ALBUMIN/GLOB SERPL: 1.6 G/DL
ALP SERPL-CCNC: 80 U/L (ref 39–117)
ALT SERPL W P-5'-P-CCNC: 13 U/L (ref 1–41)
ALT SERPL W P-5'-P-CCNC: 16 U/L (ref 1–41)
ANION GAP SERPL CALCULATED.3IONS-SCNC: 14 MMOL/L (ref 5–15)
APTT PPP: 77.3 SECONDS (ref 22–39)
AST SERPL-CCNC: 16 U/L (ref 1–40)
AST SERPL-CCNC: 18 U/L (ref 1–40)
BASOPHILS # BLD AUTO: 0.04 10*3/MM3 (ref 0–0.2)
BASOPHILS NFR BLD AUTO: 0.4 % (ref 0–1.5)
BILIRUB SERPL-MCNC: 2.8 MG/DL (ref 0–1.2)
BUN BLDA-MCNC: 16 MG/DL (ref 8–26)
BUN SERPL-MCNC: 16 MG/DL (ref 6–20)
BUN/CREAT SERPL: 19.3 (ref 7–25)
CA-I BLDA-SCNC: 1.21 MMOL/L (ref 1.2–1.32)
CALCIUM SPEC-SCNC: 9.3 MG/DL (ref 8.6–10.5)
CHLORIDE BLDA-SCNC: 99 MMOL/L (ref 98–109)
CHLORIDE SERPL-SCNC: 98 MMOL/L (ref 98–107)
CLUMPED PLATELETS: PRESENT
CO2 BLDA-SCNC: 27 MMOL/L (ref 24–29)
CO2 SERPL-SCNC: 27 MMOL/L (ref 22–29)
CREAT BLDA-MCNC: 0.9 MG/DL (ref 0.6–1.3)
CREAT BLDA-MCNC: 0.9 MG/DL (ref 0.6–1.3)
CREAT SERPL-MCNC: 0.83 MG/DL (ref 0.76–1.27)
DEPRECATED RDW RBC AUTO: 39.8 FL (ref 37–54)
EGFRCR SERPLBLD CKD-EPI 2021: 102.1 ML/MIN/1.73
EGFRCR SERPLBLD CKD-EPI 2021: 104.7 ML/MIN/1.73
EOSINOPHIL # BLD AUTO: 0.04 10*3/MM3 (ref 0–0.4)
EOSINOPHIL NFR BLD AUTO: 0.4 % (ref 0.3–6.2)
ERYTHROCYTE [DISTWIDTH] IN BLOOD BY AUTOMATED COUNT: 12.8 % (ref 12.3–15.4)
GLOBULIN UR ELPH-MCNC: 2.9 GM/DL
GLUCOSE BLDC GLUCOMTR-MCNC: 136 MG/DL (ref 70–130)
GLUCOSE SERPL-MCNC: 142 MG/DL (ref 65–99)
HCT VFR BLD AUTO: 43.8 % (ref 37.5–51)
HCT VFR BLDA CALC: 45 % (ref 38–51)
HGB BLD-MCNC: 15.7 G/DL (ref 13–17.7)
HGB BLDA-MCNC: 15.3 G/DL (ref 12–17)
HOLD SPECIMEN: NORMAL
IMM GRANULOCYTES # BLD AUTO: 0.02 10*3/MM3 (ref 0–0.05)
IMM GRANULOCYTES NFR BLD AUTO: 0.2 % (ref 0–0.5)
LYMPHOCYTES # BLD AUTO: 1.51 10*3/MM3 (ref 0.7–3.1)
LYMPHOCYTES NFR BLD AUTO: 16.1 % (ref 19.6–45.3)
MCH RBC QN AUTO: 30.9 PG (ref 26.6–33)
MCHC RBC AUTO-ENTMCNC: 35.8 G/DL (ref 31.5–35.7)
MCV RBC AUTO: 86.2 FL (ref 79–97)
MONOCYTES # BLD AUTO: 0.67 10*3/MM3 (ref 0.1–0.9)
MONOCYTES NFR BLD AUTO: 7.1 % (ref 5–12)
NEUTROPHILS NFR BLD AUTO: 7.1 10*3/MM3 (ref 1.7–7)
NEUTROPHILS NFR BLD AUTO: 75.8 % (ref 42.7–76)
NRBC BLD AUTO-RTO: 0 /100 WBC (ref 0–0.2)
PLATELET # BLD AUTO: 272 10*3/MM3 (ref 140–450)
PMV BLD AUTO: 10.2 FL (ref 6–12)
POTASSIUM BLDA-SCNC: 3.5 MMOL/L (ref 3.5–4.9)
POTASSIUM SERPL-SCNC: 3.7 MMOL/L (ref 3.5–5.2)
PROT SERPL-MCNC: 7.5 G/DL (ref 6–8.5)
RBC # BLD AUTO: 5.08 10*6/MM3 (ref 4.14–5.8)
RBC MORPH BLD: NORMAL
SODIUM BLD-SCNC: 140 MMOL/L (ref 138–146)
SODIUM SERPL-SCNC: 139 MMOL/L (ref 136–145)
TROPONIN T SERPL-MCNC: <0.01 NG/ML (ref 0–0.03)
WBC MORPH BLD: NORMAL
WBC NRBC COR # BLD: 9.38 10*3/MM3 (ref 3.4–10.8)
WHOLE BLOOD HOLD COAG: NORMAL
WHOLE BLOOD HOLD SPECIMEN: NORMAL

## 2022-09-17 PROCEDURE — 85730 THROMBOPLASTIN TIME PARTIAL: CPT | Performed by: EMERGENCY MEDICINE

## 2022-09-17 PROCEDURE — 25010000002 DEXAMETHASONE SODIUM PHOSPHATE 100 MG/10ML SOLUTION: Performed by: EMERGENCY MEDICINE

## 2022-09-17 PROCEDURE — 25010000002 LEVETIRACETAM IN NACL 0.82% 500 MG/100ML SOLUTION: Performed by: EMERGENCY MEDICINE

## 2022-09-17 PROCEDURE — A9577 INJ MULTIHANCE: HCPCS | Performed by: HOSPITALIST

## 2022-09-17 PROCEDURE — 82565 ASSAY OF CREATININE: CPT

## 2022-09-17 PROCEDURE — 70450 CT HEAD/BRAIN W/O DYE: CPT

## 2022-09-17 PROCEDURE — 99285 EMERGENCY DEPT VISIT HI MDM: CPT

## 2022-09-17 PROCEDURE — 99223 1ST HOSP IP/OBS HIGH 75: CPT | Performed by: HOSPITALIST

## 2022-09-17 PROCEDURE — 74176 CT ABD & PELVIS W/O CONTRAST: CPT

## 2022-09-17 PROCEDURE — 0 GADOBENATE DIMEGLUMINE 529 MG/ML SOLUTION: Performed by: HOSPITALIST

## 2022-09-17 PROCEDURE — 80047 BASIC METABLC PNL IONIZED CA: CPT

## 2022-09-17 PROCEDURE — 71045 X-RAY EXAM CHEST 1 VIEW: CPT

## 2022-09-17 PROCEDURE — 85014 HEMATOCRIT: CPT

## 2022-09-17 PROCEDURE — 85007 BL SMEAR W/DIFF WBC COUNT: CPT | Performed by: EMERGENCY MEDICINE

## 2022-09-17 PROCEDURE — 93005 ELECTROCARDIOGRAM TRACING: CPT | Performed by: EMERGENCY MEDICINE

## 2022-09-17 PROCEDURE — 84484 ASSAY OF TROPONIN QUANT: CPT | Performed by: EMERGENCY MEDICINE

## 2022-09-17 PROCEDURE — 71250 CT THORAX DX C-: CPT

## 2022-09-17 PROCEDURE — 80053 COMPREHEN METABOLIC PANEL: CPT | Performed by: EMERGENCY MEDICINE

## 2022-09-17 PROCEDURE — 70553 MRI BRAIN STEM W/O & W/DYE: CPT

## 2022-09-17 PROCEDURE — 85025 COMPLETE CBC W/AUTO DIFF WBC: CPT | Performed by: EMERGENCY MEDICINE

## 2022-09-17 RX ORDER — ONDANSETRON 2 MG/ML
4 INJECTION INTRAMUSCULAR; INTRAVENOUS EVERY 6 HOURS PRN
Status: DISCONTINUED | OUTPATIENT
Start: 2022-09-17 | End: 2022-09-26

## 2022-09-17 RX ORDER — LEVETIRACETAM 5 MG/ML
500 INJECTION INTRAVASCULAR EVERY 12 HOURS SCHEDULED
Status: DISCONTINUED | OUTPATIENT
Start: 2022-09-17 | End: 2022-09-26

## 2022-09-17 RX ORDER — DEXAMETHASONE SODIUM PHOSPHATE 4 MG/ML
4 INJECTION, SOLUTION INTRA-ARTICULAR; INTRALESIONAL; INTRAMUSCULAR; INTRAVENOUS; SOFT TISSUE EVERY 6 HOURS
Status: DISCONTINUED | OUTPATIENT
Start: 2022-09-18 | End: 2022-09-26

## 2022-09-17 RX ORDER — SODIUM CHLORIDE 0.9 % (FLUSH) 0.9 %
10 SYRINGE (ML) INJECTION AS NEEDED
Status: DISCONTINUED | OUTPATIENT
Start: 2022-09-17 | End: 2022-09-26

## 2022-09-17 RX ORDER — LISINOPRIL 20 MG/1
20 TABLET ORAL DAILY
Status: DISCONTINUED | OUTPATIENT
Start: 2022-09-18 | End: 2022-09-26 | Stop reason: HOSPADM

## 2022-09-17 RX ORDER — SODIUM CHLORIDE 0.9 % (FLUSH) 0.9 %
10 SYRINGE (ML) INJECTION EVERY 12 HOURS SCHEDULED
Status: DISCONTINUED | OUTPATIENT
Start: 2022-09-17 | End: 2022-09-26

## 2022-09-17 RX ORDER — ACETAMINOPHEN 650 MG/1
650 SUPPOSITORY RECTAL EVERY 4 HOURS PRN
Status: DISCONTINUED | OUTPATIENT
Start: 2022-09-17 | End: 2022-09-26

## 2022-09-17 RX ORDER — ACETAMINOPHEN 160 MG/5ML
650 SOLUTION ORAL EVERY 4 HOURS PRN
Status: DISCONTINUED | OUTPATIENT
Start: 2022-09-17 | End: 2022-09-26

## 2022-09-17 RX ORDER — ACETAMINOPHEN 325 MG/1
650 TABLET ORAL EVERY 4 HOURS PRN
Status: DISCONTINUED | OUTPATIENT
Start: 2022-09-17 | End: 2022-09-26

## 2022-09-17 RX ORDER — ONDANSETRON 4 MG/1
4 TABLET, FILM COATED ORAL EVERY 6 HOURS PRN
Status: DISCONTINUED | OUTPATIENT
Start: 2022-09-17 | End: 2022-09-26

## 2022-09-17 RX ORDER — MULTIPLE VITAMINS W/ MINERALS TAB 9MG-400MCG
1 TAB ORAL DAILY
Status: DISCONTINUED | OUTPATIENT
Start: 2022-09-18 | End: 2022-09-26

## 2022-09-17 RX ADMIN — GADOBENATE DIMEGLUMINE 20 ML: 529 INJECTION, SOLUTION INTRAVENOUS at 20:27

## 2022-09-17 RX ADMIN — Medication 10 ML: at 23:39

## 2022-09-17 RX ADMIN — DEXAMETHASONE SODIUM PHOSPHATE 10 MG: 10 INJECTION, SOLUTION INTRAMUSCULAR; INTRAVENOUS at 21:06

## 2022-09-17 RX ADMIN — LEVETIRACETAM 500 MG: 5 INJECTION INTRAVENOUS at 21:07

## 2022-09-18 LAB
ALBUMIN SERPL-MCNC: 5.1 G/DL (ref 3.5–5.2)
ALBUMIN/GLOB SERPL: 1.8 G/DL
ALP SERPL-CCNC: 88 U/L (ref 39–117)
ALT SERPL W P-5'-P-CCNC: 16 U/L (ref 1–41)
ANION GAP SERPL CALCULATED.3IONS-SCNC: 17 MMOL/L (ref 5–15)
AST SERPL-CCNC: 19 U/L (ref 1–40)
BILIRUB SERPL-MCNC: 3 MG/DL (ref 0–1.2)
BUN SERPL-MCNC: 15 MG/DL (ref 6–20)
BUN/CREAT SERPL: 18.1 (ref 7–25)
CALCIUM SPEC-SCNC: 10 MG/DL (ref 8.6–10.5)
CHLORIDE SERPL-SCNC: 98 MMOL/L (ref 98–107)
CO2 SERPL-SCNC: 23 MMOL/L (ref 22–29)
CREAT SERPL-MCNC: 0.83 MG/DL (ref 0.76–1.27)
DEPRECATED RDW RBC AUTO: 39.5 FL (ref 37–54)
EGFRCR SERPLBLD CKD-EPI 2021: 104.7 ML/MIN/1.73
ERYTHROCYTE [DISTWIDTH] IN BLOOD BY AUTOMATED COUNT: 12.5 % (ref 12.3–15.4)
GLOBULIN UR ELPH-MCNC: 2.8 GM/DL
GLUCOSE SERPL-MCNC: 135 MG/DL (ref 65–99)
HCT VFR BLD AUTO: 46.8 % (ref 37.5–51)
HGB BLD-MCNC: 16.8 G/DL (ref 13–17.7)
MCH RBC QN AUTO: 31 PG (ref 26.6–33)
MCHC RBC AUTO-ENTMCNC: 35.9 G/DL (ref 31.5–35.7)
MCV RBC AUTO: 86.3 FL (ref 79–97)
PLATELET # BLD AUTO: 308 10*3/MM3 (ref 140–450)
PMV BLD AUTO: 10.5 FL (ref 6–12)
POTASSIUM SERPL-SCNC: 4.4 MMOL/L (ref 3.5–5.2)
PROT SERPL-MCNC: 7.9 G/DL (ref 6–8.5)
QT INTERVAL: 416 MS
QTC INTERVAL: 436 MS
RBC # BLD AUTO: 5.42 10*6/MM3 (ref 4.14–5.8)
SODIUM SERPL-SCNC: 138 MMOL/L (ref 136–145)
WBC NRBC COR # BLD: 10.59 10*3/MM3 (ref 3.4–10.8)

## 2022-09-18 PROCEDURE — 99221 1ST HOSP IP/OBS SF/LOW 40: CPT | Performed by: STUDENT IN AN ORGANIZED HEALTH CARE EDUCATION/TRAINING PROGRAM

## 2022-09-18 PROCEDURE — 99232 SBSQ HOSP IP/OBS MODERATE 35: CPT | Performed by: PEDIATRICS

## 2022-09-18 PROCEDURE — 25010000002 DEXAMETHASONE PER 1 MG: Performed by: HOSPITALIST

## 2022-09-18 PROCEDURE — 86901 BLOOD TYPING SEROLOGIC RH(D): CPT

## 2022-09-18 PROCEDURE — 92610 EVALUATE SWALLOWING FUNCTION: CPT

## 2022-09-18 PROCEDURE — 86900 BLOOD TYPING SEROLOGIC ABO: CPT

## 2022-09-18 PROCEDURE — 80053 COMPREHEN METABOLIC PANEL: CPT | Performed by: HOSPITALIST

## 2022-09-18 PROCEDURE — 99223 1ST HOSP IP/OBS HIGH 75: CPT | Performed by: INTERNAL MEDICINE

## 2022-09-18 PROCEDURE — 85027 COMPLETE CBC AUTOMATED: CPT | Performed by: HOSPITALIST

## 2022-09-18 PROCEDURE — 25010000002 LEVETIRACETAM IN NACL 0.82% 500 MG/100ML SOLUTION: Performed by: HOSPITALIST

## 2022-09-18 RX ADMIN — LEVETIRACETAM 500 MG: 5 INJECTION INTRAVENOUS at 08:31

## 2022-09-18 RX ADMIN — DEXAMETHASONE SODIUM PHOSPHATE 4 MG: 4 INJECTION, SOLUTION INTRA-ARTICULAR; INTRALESIONAL; INTRAMUSCULAR; INTRAVENOUS; SOFT TISSUE at 08:29

## 2022-09-18 RX ADMIN — DEXAMETHASONE SODIUM PHOSPHATE 4 MG: 4 INJECTION, SOLUTION INTRA-ARTICULAR; INTRALESIONAL; INTRAMUSCULAR; INTRAVENOUS; SOFT TISSUE at 20:14

## 2022-09-18 RX ADMIN — LEVETIRACETAM 500 MG: 5 INJECTION INTRAVENOUS at 20:14

## 2022-09-18 RX ADMIN — ACETAMINOPHEN 650 MG: 325 TABLET, FILM COATED ORAL at 14:10

## 2022-09-18 RX ADMIN — Medication 10 ML: at 20:15

## 2022-09-18 RX ADMIN — DEXAMETHASONE SODIUM PHOSPHATE 4 MG: 4 INJECTION, SOLUTION INTRA-ARTICULAR; INTRALESIONAL; INTRAMUSCULAR; INTRAVENOUS; SOFT TISSUE at 03:24

## 2022-09-18 RX ADMIN — LISINOPRIL 20 MG: 20 TABLET ORAL at 10:44

## 2022-09-18 RX ADMIN — ACETAMINOPHEN 650 MG: 325 TABLET, FILM COATED ORAL at 20:19

## 2022-09-18 RX ADMIN — Medication 10 ML: at 10:45

## 2022-09-18 RX ADMIN — MULTIPLE VITAMINS W/ MINERALS TAB 1 TABLET: TAB at 10:44

## 2022-09-18 RX ADMIN — DEXAMETHASONE SODIUM PHOSPHATE 4 MG: 4 INJECTION, SOLUTION INTRA-ARTICULAR; INTRALESIONAL; INTRAMUSCULAR; INTRAVENOUS; SOFT TISSUE at 13:26

## 2022-09-19 ENCOUNTER — PREP FOR SURGERY (OUTPATIENT)
Dept: OTHER | Facility: HOSPITAL | Age: 52
End: 2022-09-19

## 2022-09-19 PROBLEM — R47.9 DIFFICULTY WITH SPEECH: Status: ACTIVE | Noted: 2022-09-17

## 2022-09-19 PROBLEM — D33.0 BENIGN NEOPLASM OF SUPRATENTORIAL REGION OF BRAIN (HCC): Status: RESOLVED | Noted: 2022-09-17 | Resolved: 2022-09-19

## 2022-09-19 LAB
ABO GROUP BLD: NORMAL
ABO GROUP BLD: NORMAL
BLD GP AB SCN SERPL QL: NEGATIVE
GLUCOSE BLDC GLUCOMTR-MCNC: 361 MG/DL (ref 70–130)
RH BLD: POSITIVE
RH BLD: POSITIVE
T&S EXPIRATION DATE: NORMAL

## 2022-09-19 PROCEDURE — 99232 SBSQ HOSP IP/OBS MODERATE 35: CPT | Performed by: INTERNAL MEDICINE

## 2022-09-19 PROCEDURE — 82962 GLUCOSE BLOOD TEST: CPT

## 2022-09-19 PROCEDURE — 25010000002 LEVETIRACETAM IN NACL 0.82% 500 MG/100ML SOLUTION: Performed by: HOSPITALIST

## 2022-09-19 PROCEDURE — 86901 BLOOD TYPING SEROLOGIC RH(D): CPT | Performed by: PHYSICIAN ASSISTANT

## 2022-09-19 PROCEDURE — 99232 SBSQ HOSP IP/OBS MODERATE 35: CPT | Performed by: NURSE PRACTITIONER

## 2022-09-19 PROCEDURE — 25010000002 DEXAMETHASONE PER 1 MG: Performed by: HOSPITALIST

## 2022-09-19 PROCEDURE — 63710000001 INSULIN LISPRO (HUMAN) PER 5 UNITS: Performed by: INTERNAL MEDICINE

## 2022-09-19 PROCEDURE — 97161 PT EVAL LOW COMPLEX 20 MIN: CPT

## 2022-09-19 PROCEDURE — 86900 BLOOD TYPING SEROLOGIC ABO: CPT | Performed by: PHYSICIAN ASSISTANT

## 2022-09-19 PROCEDURE — 86850 RBC ANTIBODY SCREEN: CPT | Performed by: PHYSICIAN ASSISTANT

## 2022-09-19 PROCEDURE — 99231 SBSQ HOSP IP/OBS SF/LOW 25: CPT | Performed by: STUDENT IN AN ORGANIZED HEALTH CARE EDUCATION/TRAINING PROGRAM

## 2022-09-19 RX ORDER — MAGNESIUM SULFATE HEPTAHYDRATE 40 MG/ML
4 INJECTION, SOLUTION INTRAVENOUS AS NEEDED
Status: DISCONTINUED | OUTPATIENT
Start: 2022-09-19 | End: 2022-09-26

## 2022-09-19 RX ORDER — MAGNESIUM SULFATE 1 G/100ML
1 INJECTION INTRAVENOUS AS NEEDED
Status: DISCONTINUED | OUTPATIENT
Start: 2022-09-19 | End: 2022-09-26

## 2022-09-19 RX ORDER — DEXTROSE MONOHYDRATE 25 G/50ML
25 INJECTION, SOLUTION INTRAVENOUS
Status: DISCONTINUED | OUTPATIENT
Start: 2022-09-19 | End: 2022-09-26

## 2022-09-19 RX ORDER — MAGNESIUM SULFATE HEPTAHYDRATE 40 MG/ML
2 INJECTION, SOLUTION INTRAVENOUS AS NEEDED
Status: DISCONTINUED | OUTPATIENT
Start: 2022-09-19 | End: 2022-09-26

## 2022-09-19 RX ORDER — INSULIN LISPRO 100 [IU]/ML
0-7 INJECTION, SOLUTION INTRAVENOUS; SUBCUTANEOUS
Status: DISCONTINUED | OUTPATIENT
Start: 2022-09-19 | End: 2022-09-24

## 2022-09-19 RX ORDER — SODIUM CHLORIDE 0.9 % (FLUSH) 0.9 %
10 SYRINGE (ML) INJECTION AS NEEDED
Status: CANCELLED | OUTPATIENT
Start: 2022-09-19

## 2022-09-19 RX ORDER — NICOTINE POLACRILEX 4 MG
15 LOZENGE BUCCAL
Status: DISCONTINUED | OUTPATIENT
Start: 2022-09-19 | End: 2022-09-26

## 2022-09-19 RX ORDER — SODIUM CHLORIDE 0.9 % (FLUSH) 0.9 %
10 SYRINGE (ML) INJECTION EVERY 12 HOURS SCHEDULED
Status: CANCELLED | OUTPATIENT
Start: 2022-09-19

## 2022-09-19 RX ADMIN — INSULIN LISPRO 6 UNITS: 100 INJECTION, SOLUTION INTRAVENOUS; SUBCUTANEOUS at 19:31

## 2022-09-19 RX ADMIN — DEXAMETHASONE SODIUM PHOSPHATE 4 MG: 4 INJECTION, SOLUTION INTRA-ARTICULAR; INTRALESIONAL; INTRAMUSCULAR; INTRAVENOUS; SOFT TISSUE at 20:59

## 2022-09-19 RX ADMIN — LEVETIRACETAM 500 MG: 5 INJECTION INTRAVENOUS at 20:59

## 2022-09-19 RX ADMIN — Medication 10 ML: at 21:00

## 2022-09-19 RX ADMIN — DEXAMETHASONE SODIUM PHOSPHATE 4 MG: 4 INJECTION, SOLUTION INTRA-ARTICULAR; INTRALESIONAL; INTRAMUSCULAR; INTRAVENOUS; SOFT TISSUE at 13:58

## 2022-09-19 RX ADMIN — LEVETIRACETAM 500 MG: 5 INJECTION INTRAVENOUS at 10:37

## 2022-09-19 RX ADMIN — MULTIPLE VITAMINS W/ MINERALS TAB 1 TABLET: TAB at 10:37

## 2022-09-19 RX ADMIN — LISINOPRIL 20 MG: 20 TABLET ORAL at 10:37

## 2022-09-19 RX ADMIN — DEXAMETHASONE SODIUM PHOSPHATE 4 MG: 4 INJECTION, SOLUTION INTRA-ARTICULAR; INTRALESIONAL; INTRAMUSCULAR; INTRAVENOUS; SOFT TISSUE at 02:58

## 2022-09-19 RX ADMIN — DEXAMETHASONE SODIUM PHOSPHATE 4 MG: 4 INJECTION, SOLUTION INTRA-ARTICULAR; INTRALESIONAL; INTRAMUSCULAR; INTRAVENOUS; SOFT TISSUE at 10:37

## 2022-09-19 RX ADMIN — Medication 10 ML: at 10:37

## 2022-09-20 ENCOUNTER — ANESTHESIA EVENT (OUTPATIENT)
Dept: PERIOP | Facility: HOSPITAL | Age: 52
End: 2022-09-20

## 2022-09-20 LAB
ANION GAP SERPL CALCULATED.3IONS-SCNC: 14 MMOL/L (ref 5–15)
BUN SERPL-MCNC: 26 MG/DL (ref 6–20)
BUN/CREAT SERPL: 24.8 (ref 7–25)
CALCIUM SPEC-SCNC: 9.1 MG/DL (ref 8.6–10.5)
CHLORIDE SERPL-SCNC: 103 MMOL/L (ref 98–107)
CO2 SERPL-SCNC: 23 MMOL/L (ref 22–29)
CREAT SERPL-MCNC: 1.05 MG/DL (ref 0.76–1.27)
EGFRCR SERPLBLD CKD-EPI 2021: 84.9 ML/MIN/1.73
GLUCOSE BLDC GLUCOMTR-MCNC: 137 MG/DL (ref 70–130)
GLUCOSE BLDC GLUCOMTR-MCNC: 191 MG/DL (ref 70–130)
GLUCOSE BLDC GLUCOMTR-MCNC: 273 MG/DL (ref 70–130)
GLUCOSE BLDC GLUCOMTR-MCNC: 291 MG/DL (ref 70–130)
GLUCOSE SERPL-MCNC: 140 MG/DL (ref 65–99)
HBA1C MFR BLD: 6.1 % (ref 4.8–5.6)
MAGNESIUM SERPL-MCNC: 2.1 MG/DL (ref 1.6–2.6)
POTASSIUM SERPL-SCNC: 4 MMOL/L (ref 3.5–5.2)
SODIUM SERPL-SCNC: 140 MMOL/L (ref 136–145)

## 2022-09-20 PROCEDURE — 99232 SBSQ HOSP IP/OBS MODERATE 35: CPT | Performed by: INTERNAL MEDICINE

## 2022-09-20 PROCEDURE — 82962 GLUCOSE BLOOD TEST: CPT

## 2022-09-20 PROCEDURE — S0260 H&P FOR SURGERY: HCPCS | Performed by: STUDENT IN AN ORGANIZED HEALTH CARE EDUCATION/TRAINING PROGRAM

## 2022-09-20 PROCEDURE — 25010000002 DEXAMETHASONE PER 1 MG: Performed by: HOSPITALIST

## 2022-09-20 PROCEDURE — 80048 BASIC METABOLIC PNL TOTAL CA: CPT | Performed by: INTERNAL MEDICINE

## 2022-09-20 PROCEDURE — 25010000002 LEVETIRACETAM IN NACL 0.82% 500 MG/100ML SOLUTION: Performed by: HOSPITALIST

## 2022-09-20 PROCEDURE — 83735 ASSAY OF MAGNESIUM: CPT | Performed by: INTERNAL MEDICINE

## 2022-09-20 PROCEDURE — 83036 HEMOGLOBIN GLYCOSYLATED A1C: CPT | Performed by: INTERNAL MEDICINE

## 2022-09-20 PROCEDURE — 63710000001 INSULIN LISPRO (HUMAN) PER 5 UNITS: Performed by: INTERNAL MEDICINE

## 2022-09-20 RX ORDER — SODIUM CHLORIDE 0.9 % (FLUSH) 0.9 %
10 SYRINGE (ML) INJECTION AS NEEDED
Status: CANCELLED | OUTPATIENT
Start: 2022-09-20

## 2022-09-20 RX ORDER — SODIUM CHLORIDE 0.9 % (FLUSH) 0.9 %
10 SYRINGE (ML) INJECTION EVERY 12 HOURS SCHEDULED
Status: CANCELLED | OUTPATIENT
Start: 2022-09-20

## 2022-09-20 RX ORDER — FAMOTIDINE 10 MG/ML
20 INJECTION, SOLUTION INTRAVENOUS ONCE
Status: CANCELLED | OUTPATIENT
Start: 2022-09-20 | End: 2022-09-20

## 2022-09-20 RX ADMIN — LISINOPRIL 20 MG: 20 TABLET ORAL at 08:51

## 2022-09-20 RX ADMIN — DEXAMETHASONE SODIUM PHOSPHATE 4 MG: 4 INJECTION, SOLUTION INTRA-ARTICULAR; INTRALESIONAL; INTRAMUSCULAR; INTRAVENOUS; SOFT TISSUE at 21:22

## 2022-09-20 RX ADMIN — LEVETIRACETAM 500 MG: 5 INJECTION INTRAVENOUS at 08:51

## 2022-09-20 RX ADMIN — DEXAMETHASONE SODIUM PHOSPHATE 4 MG: 4 INJECTION, SOLUTION INTRA-ARTICULAR; INTRALESIONAL; INTRAMUSCULAR; INTRAVENOUS; SOFT TISSUE at 16:33

## 2022-09-20 RX ADMIN — DEXAMETHASONE SODIUM PHOSPHATE 4 MG: 4 INJECTION, SOLUTION INTRA-ARTICULAR; INTRALESIONAL; INTRAMUSCULAR; INTRAVENOUS; SOFT TISSUE at 08:52

## 2022-09-20 RX ADMIN — LEVETIRACETAM 500 MG: 5 INJECTION INTRAVENOUS at 21:22

## 2022-09-20 RX ADMIN — INSULIN LISPRO 2 UNITS: 100 INJECTION, SOLUTION INTRAVENOUS; SUBCUTANEOUS at 16:33

## 2022-09-20 RX ADMIN — Medication 10 ML: at 21:22

## 2022-09-20 RX ADMIN — DEXAMETHASONE SODIUM PHOSPHATE 4 MG: 4 INJECTION, SOLUTION INTRA-ARTICULAR; INTRALESIONAL; INTRAMUSCULAR; INTRAVENOUS; SOFT TISSUE at 02:34

## 2022-09-20 RX ADMIN — INSULIN LISPRO 4 UNITS: 100 INJECTION, SOLUTION INTRAVENOUS; SUBCUTANEOUS at 11:29

## 2022-09-20 RX ADMIN — MULTIPLE VITAMINS W/ MINERALS TAB 1 TABLET: TAB at 08:51

## 2022-09-21 ENCOUNTER — ANESTHESIA (OUTPATIENT)
Dept: PERIOP | Facility: HOSPITAL | Age: 52
End: 2022-09-21

## 2022-09-21 ENCOUNTER — APPOINTMENT (OUTPATIENT)
Dept: CT IMAGING | Facility: HOSPITAL | Age: 52
End: 2022-09-21

## 2022-09-21 ENCOUNTER — APPOINTMENT (OUTPATIENT)
Dept: GENERAL RADIOLOGY | Facility: HOSPITAL | Age: 52
End: 2022-09-21

## 2022-09-21 PROBLEM — R73.9 HYPERGLYCEMIA: Status: ACTIVE | Noted: 2022-09-21

## 2022-09-21 PROBLEM — J96.01 ACUTE RESPIRATORY FAILURE WITH HYPOXIA (HCC): Status: ACTIVE | Noted: 2022-09-21

## 2022-09-21 LAB
ARTERIAL PATENCY WRIST A: ABNORMAL
ATMOSPHERIC PRESS: ABNORMAL MM[HG]
BASE EXCESS BLDA CALC-SCNC: -1.6 MMOL/L (ref 0–2)
BDY SITE: ABNORMAL
BODY TEMPERATURE: 37 C
CO2 BLDA-SCNC: 24 MMOL/L (ref 22–33)
COHGB MFR BLD: 0.8 % (ref 0–2)
EPAP: 0
GLUCOSE BLDC GLUCOMTR-MCNC: 149 MG/DL (ref 70–130)
GLUCOSE BLDC GLUCOMTR-MCNC: 156 MG/DL (ref 70–130)
HCO3 BLDA-SCNC: 22.9 MMOL/L (ref 20–26)
HCT VFR BLD CALC: 44.6 % (ref 38–51)
HGB BLDA-MCNC: 14.6 G/DL (ref 13.5–17.5)
INHALED O2 CONCENTRATION: 80 %
IPAP: 0
METHGB BLD QL: 0.5 % (ref 0–1.5)
MODALITY: ABNORMAL
NOTE: ABNORMAL
OXYHGB MFR BLDV: 97.8 % (ref 94–99)
PAW @ PEAK INSP FLOW SETTING VENT: 0 CMH2O
PCO2 BLDA: 37 MM HG (ref 35–45)
PCO2 TEMP ADJ BLD: 37 MM HG (ref 35–48)
PH BLDA: 7.4 PH UNITS (ref 7.35–7.45)
PH, TEMP CORRECTED: 7.4 PH UNITS
PO2 BLDA: 125 MM HG (ref 83–108)
PO2 TEMP ADJ BLD: 125 MM HG (ref 83–108)
TOTAL RATE: 0 BREATHS/MINUTE

## 2022-09-21 PROCEDURE — 71045 X-RAY EXAM CHEST 1 VIEW: CPT

## 2022-09-21 PROCEDURE — 25010000002 DEXAMETHASONE PER 1 MG: Performed by: STUDENT IN AN ORGANIZED HEALTH CARE EDUCATION/TRAINING PROGRAM

## 2022-09-21 PROCEDURE — 88307 TISSUE EXAM BY PATHOLOGIST: CPT | Performed by: STUDENT IN AN ORGANIZED HEALTH CARE EDUCATION/TRAINING PROGRAM

## 2022-09-21 PROCEDURE — C1889 IMPLANT/INSERT DEVICE, NOC: HCPCS | Performed by: STUDENT IN AN ORGANIZED HEALTH CARE EDUCATION/TRAINING PROGRAM

## 2022-09-21 PROCEDURE — 25010000002 HYDRALAZINE PER 20 MG: Performed by: PHYSICIAN ASSISTANT

## 2022-09-21 PROCEDURE — 99291 CRITICAL CARE FIRST HOUR: CPT | Performed by: INTERNAL MEDICINE

## 2022-09-21 PROCEDURE — 69990 MICROSURGERY ADD-ON: CPT | Performed by: STUDENT IN AN ORGANIZED HEALTH CARE EDUCATION/TRAINING PROGRAM

## 2022-09-21 PROCEDURE — 25010000002 DEXAMETHASONE PER 1 MG: Performed by: HOSPITALIST

## 2022-09-21 PROCEDURE — 25010000002 CEFAZOLIN IN DEXTROSE 2-4 GM/100ML-% SOLUTION: Performed by: STUDENT IN AN ORGANIZED HEALTH CARE EDUCATION/TRAINING PROGRAM

## 2022-09-21 PROCEDURE — 81479 UNLISTED MOLECULAR PATHOLOGY: CPT

## 2022-09-21 PROCEDURE — 61781 SCAN PROC CRANIAL INTRA: CPT | Performed by: STUDENT IN AN ORGANIZED HEALTH CARE EDUCATION/TRAINING PROGRAM

## 2022-09-21 PROCEDURE — 25010000002 LEVETIRACETAM IN NACL 0.82% 500 MG/100ML SOLUTION: Performed by: STUDENT IN AN ORGANIZED HEALTH CARE EDUCATION/TRAINING PROGRAM

## 2022-09-21 PROCEDURE — 70450 CT HEAD/BRAIN W/O DYE: CPT

## 2022-09-21 PROCEDURE — C1713 ANCHOR/SCREW BN/BN,TIS/BN: HCPCS | Performed by: STUDENT IN AN ORGANIZED HEALTH CARE EDUCATION/TRAINING PROGRAM

## 2022-09-21 PROCEDURE — 25010000002 LEVETIRACETAM IN NACL 0.82% 500 MG/100ML SOLUTION: Performed by: HOSPITALIST

## 2022-09-21 PROCEDURE — 82805 BLOOD GASES W/O2 SATURATION: CPT

## 2022-09-21 PROCEDURE — 82375 ASSAY CARBOXYHB QUANT: CPT

## 2022-09-21 PROCEDURE — 25010000002 ONDANSETRON PER 1 MG: Performed by: NURSE ANESTHETIST, CERTIFIED REGISTERED

## 2022-09-21 PROCEDURE — 61510 CRNEC TREPH EXC BRN TUM STTL: CPT | Performed by: STUDENT IN AN ORGANIZED HEALTH CARE EDUCATION/TRAINING PROGRAM

## 2022-09-21 PROCEDURE — 25010000002 VANCOMYCIN 1 G RECONSTITUTED SOLUTION: Performed by: STUDENT IN AN ORGANIZED HEALTH CARE EDUCATION/TRAINING PROGRAM

## 2022-09-21 PROCEDURE — 61510 CRNEC TREPH EXC BRN TUM STTL: CPT | Performed by: PHYSICIAN ASSISTANT

## 2022-09-21 PROCEDURE — 83050 HGB METHEMOGLOBIN QUAN: CPT

## 2022-09-21 PROCEDURE — 8E09XBZ COMPUTER ASSISTED PROCEDURE OF HEAD AND NECK REGION: ICD-10-PCS | Performed by: STUDENT IN AN ORGANIZED HEALTH CARE EDUCATION/TRAINING PROGRAM

## 2022-09-21 PROCEDURE — 25010000002 CEFAZOLIN IN DEXTROSE 2-4 GM/100ML-% SOLUTION: Performed by: PHYSICIAN ASSISTANT

## 2022-09-21 PROCEDURE — 25010000002 FENTANYL CITRATE (PF) 50 MCG/ML SOLUTION: Performed by: NURSE ANESTHETIST, CERTIFIED REGISTERED

## 2022-09-21 PROCEDURE — 25010000002 PROPOFOL 10 MG/ML EMULSION: Performed by: NURSE ANESTHETIST, CERTIFIED REGISTERED

## 2022-09-21 PROCEDURE — 88331 PATH CONSLTJ SURG 1 BLK 1SPC: CPT | Performed by: STUDENT IN AN ORGANIZED HEALTH CARE EDUCATION/TRAINING PROGRAM

## 2022-09-21 PROCEDURE — 82962 GLUCOSE BLOOD TEST: CPT

## 2022-09-21 PROCEDURE — 00B00ZZ EXCISION OF BRAIN, OPEN APPROACH: ICD-10-PCS | Performed by: STUDENT IN AN ORGANIZED HEALTH CARE EDUCATION/TRAINING PROGRAM

## 2022-09-21 DEVICE — ABSORBABLE HEMOSTAT (OXIDIZED REGENERATED CELLULOSE, U.S.P.)
Type: IMPLANTABLE DEVICE | Site: CRANIAL | Status: FUNCTIONAL
Brand: SURGICEL

## 2022-09-21 DEVICE — DURAGEN® PLUS DURAL REGENERATION MATRIX, 3 IN X 3 IN (7.5 CM X 7.5 CM)
Type: IMPLANTABLE DEVICE | Site: CRANIAL | Status: FUNCTIONAL
Brand: DURAGEN® PLUS

## 2022-09-21 DEVICE — PLT CVR BURHL MATRIXNEURO TI 17MM: Type: IMPLANTABLE DEVICE | Site: CRANIAL | Status: FUNCTIONAL

## 2022-09-21 DEVICE — FLOSEAL HEMOSTATIC MATRIX, 10ML
Type: IMPLANTABLE DEVICE | Site: CRANIAL | Status: FUNCTIONAL
Brand: FLOSEAL HEMOSTATIC MATRIX

## 2022-09-21 DEVICE — HEMOST ABS SURGIFOAM SZ100 8X12 10MM: Type: IMPLANTABLE DEVICE | Site: CRANIAL | Status: FUNCTIONAL

## 2022-09-21 DEVICE — SCRW MATRIXNEURO SD TI 4MM: Type: IMPLANTABLE DEVICE | Site: CRANIAL | Status: FUNCTIONAL

## 2022-09-21 RX ORDER — VANCOMYCIN HYDROCHLORIDE 1 G/20ML
INJECTION, POWDER, LYOPHILIZED, FOR SOLUTION INTRAVENOUS AS NEEDED
Status: DISCONTINUED | OUTPATIENT
Start: 2022-09-21 | End: 2022-09-21 | Stop reason: HOSPADM

## 2022-09-21 RX ORDER — PROPOFOL 10 MG/ML
VIAL (ML) INTRAVENOUS AS NEEDED
Status: DISCONTINUED | OUTPATIENT
Start: 2022-09-21 | End: 2022-09-21 | Stop reason: SURG

## 2022-09-21 RX ORDER — CEFAZOLIN SODIUM 2 G/100ML
2 INJECTION, SOLUTION INTRAVENOUS ONCE
Status: COMPLETED | OUTPATIENT
Start: 2022-09-21 | End: 2022-09-21

## 2022-09-21 RX ORDER — ESMOLOL HYDROCHLORIDE 10 MG/ML
INJECTION INTRAVENOUS AS NEEDED
Status: DISCONTINUED | OUTPATIENT
Start: 2022-09-21 | End: 2022-09-21 | Stop reason: SURG

## 2022-09-21 RX ORDER — ONDANSETRON 2 MG/ML
INJECTION INTRAMUSCULAR; INTRAVENOUS AS NEEDED
Status: DISCONTINUED | OUTPATIENT
Start: 2022-09-21 | End: 2022-09-21 | Stop reason: SURG

## 2022-09-21 RX ORDER — FENTANYL CITRATE 50 UG/ML
INJECTION, SOLUTION INTRAMUSCULAR; INTRAVENOUS AS NEEDED
Status: DISCONTINUED | OUTPATIENT
Start: 2022-09-21 | End: 2022-09-21 | Stop reason: SURG

## 2022-09-21 RX ORDER — EPHEDRINE SULFATE 50 MG/ML
INJECTION, SOLUTION INTRAVENOUS AS NEEDED
Status: DISCONTINUED | OUTPATIENT
Start: 2022-09-21 | End: 2022-09-21 | Stop reason: SURG

## 2022-09-21 RX ORDER — SODIUM CHLORIDE, SODIUM LACTATE, POTASSIUM CHLORIDE, CALCIUM CHLORIDE 600; 310; 30; 20 MG/100ML; MG/100ML; MG/100ML; MG/100ML
100 INJECTION, SOLUTION INTRAVENOUS CONTINUOUS
Status: DISCONTINUED | OUTPATIENT
Start: 2022-09-21 | End: 2022-09-23

## 2022-09-21 RX ORDER — DOCUSATE SODIUM 100 MG/1
100 CAPSULE, LIQUID FILLED ORAL 2 TIMES DAILY PRN
Status: DISCONTINUED | OUTPATIENT
Start: 2022-09-21 | End: 2022-09-26

## 2022-09-21 RX ORDER — BUPIVACAINE HCL/0.9 % NACL/PF 0.125 %
PLASTIC BAG, INJECTION (ML) EPIDURAL AS NEEDED
Status: DISCONTINUED | OUTPATIENT
Start: 2022-09-21 | End: 2022-09-21 | Stop reason: SURG

## 2022-09-21 RX ORDER — SODIUM CHLORIDE, SODIUM LACTATE, POTASSIUM CHLORIDE, CALCIUM CHLORIDE 600; 310; 30; 20 MG/100ML; MG/100ML; MG/100ML; MG/100ML
INJECTION, SOLUTION INTRAVENOUS CONTINUOUS PRN
Status: DISCONTINUED | OUTPATIENT
Start: 2022-09-21 | End: 2022-09-21 | Stop reason: SURG

## 2022-09-21 RX ORDER — CEFAZOLIN SODIUM 2 G/100ML
2 INJECTION, SOLUTION INTRAVENOUS EVERY 8 HOURS
Status: COMPLETED | OUTPATIENT
Start: 2022-09-21 | End: 2022-09-22

## 2022-09-21 RX ORDER — AMOXICILLIN 250 MG
1 CAPSULE ORAL NIGHTLY PRN
Status: DISCONTINUED | OUTPATIENT
Start: 2022-09-21 | End: 2022-09-26

## 2022-09-21 RX ORDER — FENTANYL CITRATE 50 UG/ML
50 INJECTION, SOLUTION INTRAMUSCULAR; INTRAVENOUS
Status: DISCONTINUED | OUTPATIENT
Start: 2022-09-21 | End: 2022-09-21 | Stop reason: HOSPADM

## 2022-09-21 RX ORDER — LIDOCAINE HYDROCHLORIDE AND EPINEPHRINE 5; 5 MG/ML; UG/ML
INJECTION, SOLUTION INFILTRATION; PERINEURAL AS NEEDED
Status: DISCONTINUED | OUTPATIENT
Start: 2022-09-21 | End: 2022-09-21 | Stop reason: HOSPADM

## 2022-09-21 RX ORDER — CHOLECALCIFEROL (VITAMIN D3) 125 MCG
5 CAPSULE ORAL NIGHTLY PRN
Status: DISCONTINUED | OUTPATIENT
Start: 2022-09-21 | End: 2022-09-26

## 2022-09-21 RX ORDER — MIDAZOLAM HYDROCHLORIDE 1 MG/ML
1 INJECTION INTRAMUSCULAR; INTRAVENOUS
Status: DISCONTINUED | OUTPATIENT
Start: 2022-09-21 | End: 2022-09-21 | Stop reason: HOSPADM

## 2022-09-21 RX ORDER — ENOXAPARIN SODIUM 100 MG/ML
40 INJECTION SUBCUTANEOUS DAILY
Status: DISCONTINUED | OUTPATIENT
Start: 2022-09-22 | End: 2022-09-26

## 2022-09-21 RX ORDER — MANNITOL 20 G/100ML
INJECTION, SOLUTION INTRAVENOUS CONTINUOUS PRN
Status: DISCONTINUED | OUTPATIENT
Start: 2022-09-21 | End: 2022-09-21 | Stop reason: SURG

## 2022-09-21 RX ORDER — SODIUM CHLORIDE, SODIUM LACTATE, POTASSIUM CHLORIDE, CALCIUM CHLORIDE 600; 310; 30; 20 MG/100ML; MG/100ML; MG/100ML; MG/100ML
9 INJECTION, SOLUTION INTRAVENOUS CONTINUOUS
Status: DISCONTINUED | OUTPATIENT
Start: 2022-09-21 | End: 2022-09-22

## 2022-09-21 RX ORDER — HYDRALAZINE HYDROCHLORIDE 20 MG/ML
10 INJECTION INTRAMUSCULAR; INTRAVENOUS EVERY 6 HOURS PRN
Status: DISCONTINUED | OUTPATIENT
Start: 2022-09-21 | End: 2022-09-26

## 2022-09-21 RX ORDER — NICARDIPINE HYDROCHLORIDE 2.5 MG/ML
INJECTION INTRAVENOUS
Status: DISPENSED
Start: 2022-09-21 | End: 2022-09-21

## 2022-09-21 RX ORDER — PANTOPRAZOLE SODIUM 40 MG/1
40 TABLET, DELAYED RELEASE ORAL
Status: DISCONTINUED | OUTPATIENT
Start: 2022-09-22 | End: 2022-09-26

## 2022-09-21 RX ORDER — LIDOCAINE HYDROCHLORIDE 20 MG/ML
INJECTION, SOLUTION INFILTRATION; PERINEURAL AS NEEDED
Status: DISCONTINUED | OUTPATIENT
Start: 2022-09-21 | End: 2022-09-21 | Stop reason: SURG

## 2022-09-21 RX ORDER — LABETALOL HYDROCHLORIDE 5 MG/ML
5 INJECTION, SOLUTION INTRAVENOUS
Status: DISCONTINUED | OUTPATIENT
Start: 2022-09-21 | End: 2022-09-21 | Stop reason: HOSPADM

## 2022-09-21 RX ORDER — SODIUM CHLORIDE 9 MG/ML
INJECTION, SOLUTION INTRAVENOUS AS NEEDED
Status: DISCONTINUED | OUTPATIENT
Start: 2022-09-21 | End: 2022-09-21 | Stop reason: HOSPADM

## 2022-09-21 RX ORDER — NALOXONE HCL 0.4 MG/ML
0.4 VIAL (ML) INJECTION
Status: DISCONTINUED | OUTPATIENT
Start: 2022-09-21 | End: 2022-09-26

## 2022-09-21 RX ORDER — POLYETHYLENE GLYCOL 3350 17 G/17G
17 POWDER, FOR SOLUTION ORAL DAILY PRN
Status: DISCONTINUED | OUTPATIENT
Start: 2022-09-21 | End: 2022-09-26

## 2022-09-21 RX ORDER — FAMOTIDINE 20 MG/1
20 TABLET, FILM COATED ORAL ONCE
Status: COMPLETED | OUTPATIENT
Start: 2022-09-21 | End: 2022-09-21

## 2022-09-21 RX ORDER — ROCURONIUM BROMIDE 10 MG/ML
INJECTION, SOLUTION INTRAVENOUS AS NEEDED
Status: DISCONTINUED | OUTPATIENT
Start: 2022-09-21 | End: 2022-09-21 | Stop reason: SURG

## 2022-09-21 RX ORDER — GLYCOPYRROLATE 0.2 MG/ML
INJECTION INTRAMUSCULAR; INTRAVENOUS AS NEEDED
Status: DISCONTINUED | OUTPATIENT
Start: 2022-09-21 | End: 2022-09-21 | Stop reason: SURG

## 2022-09-21 RX ORDER — LABETALOL HYDROCHLORIDE 5 MG/ML
INJECTION, SOLUTION INTRAVENOUS AS NEEDED
Status: DISCONTINUED | OUTPATIENT
Start: 2022-09-21 | End: 2022-09-21 | Stop reason: SURG

## 2022-09-21 RX ORDER — ONDANSETRON 2 MG/ML
4 INJECTION INTRAMUSCULAR; INTRAVENOUS ONCE AS NEEDED
Status: DISCONTINUED | OUTPATIENT
Start: 2022-09-21 | End: 2022-09-21 | Stop reason: HOSPADM

## 2022-09-21 RX ORDER — LIDOCAINE HYDROCHLORIDE 10 MG/ML
0.5 INJECTION, SOLUTION EPIDURAL; INFILTRATION; INTRACAUDAL; PERINEURAL ONCE AS NEEDED
Status: DISCONTINUED | OUTPATIENT
Start: 2022-09-21 | End: 2022-09-21 | Stop reason: HOSPADM

## 2022-09-21 RX ORDER — MAGNESIUM HYDROXIDE 1200 MG/15ML
LIQUID ORAL AS NEEDED
Status: DISCONTINUED | OUTPATIENT
Start: 2022-09-21 | End: 2022-09-21 | Stop reason: HOSPADM

## 2022-09-21 RX ORDER — HYDRALAZINE HYDROCHLORIDE 20 MG/ML
5 INJECTION INTRAMUSCULAR; INTRAVENOUS
Status: DISCONTINUED | OUTPATIENT
Start: 2022-09-21 | End: 2022-09-21 | Stop reason: HOSPADM

## 2022-09-21 RX ADMIN — PROPOFOL 25 MCG/KG/MIN: 10 INJECTION, EMULSION INTRAVENOUS at 07:38

## 2022-09-21 RX ADMIN — SODIUM CHLORIDE, POTASSIUM CHLORIDE, SODIUM LACTATE AND CALCIUM CHLORIDE 9 ML/HR: 600; 310; 30; 20 INJECTION, SOLUTION INTRAVENOUS at 06:00

## 2022-09-21 RX ADMIN — Medication 25 MCG: at 09:12

## 2022-09-21 RX ADMIN — ROCURONIUM BROMIDE 10 MG: 50 INJECTION, SOLUTION INTRAVENOUS at 09:11

## 2022-09-21 RX ADMIN — LABETALOL 20 MG/4 ML (5 MG/ML) INTRAVENOUS SYRINGE 5 MG: at 08:10

## 2022-09-21 RX ADMIN — Medication 25 MCG: at 09:01

## 2022-09-21 RX ADMIN — Medication 50 MCG: at 08:39

## 2022-09-21 RX ADMIN — SODIUM CHLORIDE 10 MG/HR: 9 INJECTION, SOLUTION INTRAVENOUS at 13:25

## 2022-09-21 RX ADMIN — SODIUM CHLORIDE 5 MG/HR: 9 INJECTION, SOLUTION INTRAVENOUS at 17:27

## 2022-09-21 RX ADMIN — LABETALOL 20 MG/4 ML (5 MG/ML) INTRAVENOUS SYRINGE 10 MG: at 10:36

## 2022-09-21 RX ADMIN — LABETALOL 20 MG/4 ML (5 MG/ML) INTRAVENOUS SYRINGE 5 MG: at 10:27

## 2022-09-21 RX ADMIN — ROCURONIUM BROMIDE 20 MG: 50 INJECTION, SOLUTION INTRAVENOUS at 08:13

## 2022-09-21 RX ADMIN — Medication 25 MCG: at 08:42

## 2022-09-21 RX ADMIN — HYDRALAZINE HYDROCHLORIDE 10 MG: 20 INJECTION INTRAMUSCULAR; INTRAVENOUS at 01:21

## 2022-09-21 RX ADMIN — LABETALOL 20 MG/4 ML (5 MG/ML) INTRAVENOUS SYRINGE 5 MG: at 10:30

## 2022-09-21 RX ADMIN — EPHEDRINE SULFATE 5 MG: 50 INJECTION INTRAVENOUS at 08:30

## 2022-09-21 RX ADMIN — PROPOFOL 50 MG: 10 INJECTION, EMULSION INTRAVENOUS at 07:40

## 2022-09-21 RX ADMIN — FENTANYL CITRATE 50 MCG: 50 INJECTION, SOLUTION INTRAMUSCULAR; INTRAVENOUS at 07:51

## 2022-09-21 RX ADMIN — DEXAMETHASONE SODIUM PHOSPHATE 10 MG: 4 INJECTION, SOLUTION INTRA-ARTICULAR; INTRALESIONAL; INTRAMUSCULAR; INTRAVENOUS; SOFT TISSUE at 07:38

## 2022-09-21 RX ADMIN — PROPOFOL 50 MG: 10 INJECTION, EMULSION INTRAVENOUS at 07:50

## 2022-09-21 RX ADMIN — DEXAMETHASONE SODIUM PHOSPHATE 4 MG: 4 INJECTION, SOLUTION INTRA-ARTICULAR; INTRALESIONAL; INTRAMUSCULAR; INTRAVENOUS; SOFT TISSUE at 20:07

## 2022-09-21 RX ADMIN — ROCURONIUM BROMIDE 20 MG: 50 INJECTION, SOLUTION INTRAVENOUS at 08:42

## 2022-09-21 RX ADMIN — Medication 100 MCG: at 08:30

## 2022-09-21 RX ADMIN — FAMOTIDINE 20 MG: 20 TABLET ORAL at 06:00

## 2022-09-21 RX ADMIN — LEVETIRACETAM 500 MG: 5 INJECTION INTRAVENOUS at 21:09

## 2022-09-21 RX ADMIN — MANNITOL: 20 INJECTION, SOLUTION INTRAVENOUS at 08:30

## 2022-09-21 RX ADMIN — LEVETIRACETAM 1000 MG: 5 INJECTION INTRAVENOUS at 07:48

## 2022-09-21 RX ADMIN — ESMOLOL HYDROCHLORIDE 50 MG: 10 INJECTION, SOLUTION INTRAVENOUS at 07:50

## 2022-09-21 RX ADMIN — Medication 25 MCG: at 08:52

## 2022-09-21 RX ADMIN — DEXAMETHASONE SODIUM PHOSPHATE 4 MG: 4 INJECTION, SOLUTION INTRA-ARTICULAR; INTRALESIONAL; INTRAMUSCULAR; INTRAVENOUS; SOFT TISSUE at 13:25

## 2022-09-21 RX ADMIN — SODIUM CHLORIDE 10 MG/HR: 9 INJECTION, SOLUTION INTRAVENOUS at 11:27

## 2022-09-21 RX ADMIN — ROCURONIUM BROMIDE 50 MG: 50 INJECTION, SOLUTION INTRAVENOUS at 07:32

## 2022-09-21 RX ADMIN — PROPOFOL 220 MG: 10 INJECTION, EMULSION INTRAVENOUS at 07:32

## 2022-09-21 RX ADMIN — SODIUM CHLORIDE, POTASSIUM CHLORIDE, SODIUM LACTATE AND CALCIUM CHLORIDE 100 ML/HR: 600; 310; 30; 20 INJECTION, SOLUTION INTRAVENOUS at 23:57

## 2022-09-21 RX ADMIN — DEXAMETHASONE SODIUM PHOSPHATE 4 MG: 4 INJECTION, SOLUTION INTRA-ARTICULAR; INTRALESIONAL; INTRAMUSCULAR; INTRAVENOUS; SOFT TISSUE at 01:23

## 2022-09-21 RX ADMIN — GLYCOPYRROLATE 0.1 MG: 0.2 INJECTION INTRAMUSCULAR; INTRAVENOUS at 08:58

## 2022-09-21 RX ADMIN — ONDANSETRON 4 MG: 2 INJECTION INTRAMUSCULAR; INTRAVENOUS at 10:31

## 2022-09-21 RX ADMIN — CEFAZOLIN SODIUM 2 G: 2 INJECTION, SOLUTION INTRAVENOUS at 16:52

## 2022-09-21 RX ADMIN — CEFAZOLIN SODIUM 2 G: 2 INJECTION, SOLUTION INTRAVENOUS at 07:38

## 2022-09-21 RX ADMIN — CEFAZOLIN SODIUM 2 G: 2 INJECTION, SOLUTION INTRAVENOUS at 23:57

## 2022-09-21 RX ADMIN — Medication 10 ML: at 21:09

## 2022-09-21 RX ADMIN — LABETALOL 20 MG/4 ML (5 MG/ML) INTRAVENOUS SYRINGE 5 MG: at 08:00

## 2022-09-21 RX ADMIN — SODIUM CHLORIDE, POTASSIUM CHLORIDE, SODIUM LACTATE AND CALCIUM CHLORIDE: 600; 310; 30; 20 INJECTION, SOLUTION INTRAVENOUS at 07:28

## 2022-09-21 RX ADMIN — FENTANYL CITRATE 50 MCG: 50 INJECTION, SOLUTION INTRAMUSCULAR; INTRAVENOUS at 07:32

## 2022-09-21 RX ADMIN — SUGAMMADEX 300 MG: 100 INJECTION, SOLUTION INTRAVENOUS at 10:40

## 2022-09-21 RX ADMIN — LIDOCAINE HYDROCHLORIDE 50 MG: 20 INJECTION, SOLUTION INFILTRATION; PERINEURAL at 07:32

## 2022-09-21 NOTE — ANESTHESIA POSTPROCEDURE EVALUATION
Patient: Nick Hancock    Procedure Summary     Date: 09/21/22 Room / Location:  [facility] /  [facility]    Anesthesia Start: 0728 Anesthesia Stop: 1108    Procedure: CRANIOTOMY FOR TUMOR (N/A Head) Diagnosis:       Difficulty with speech      Brain mass      (Difficulty with speech [R47.9])      (Brain mass [G93.89])    Surgeons: Umair Patton MD Provider: Michael Cruz MD    Anesthesia Type: general, Rose ASA Status: 3          Anesthesia Type: general, [place]    Vitals  Vitals Value Taken Time   /64 09/21/22 1105   Temp 98 °F (36.7 °C) 09/21/22 1100   Pulse 80 09/21/22 1110   Resp 16 09/21/22 1100   SpO2 94 % 09/21/22 1110   Vitals shown include unvalidated device data.        Post Anesthesia Care and Evaluation    Patient location during evaluation: PACU  Patient participation: complete - patient participated  Level of consciousness: awake and alert  Pain management: adequate    Airway patency: patent  Anesthetic complications: No anesthetic complications  PONV Status: none  Cardiovascular status: hemodynamically stable and acceptable  Respiratory status: nonlabored ventilation, acceptable and nasal cannula  Hydration status: acceptable

## 2022-09-21 NOTE — ANESTHESIA PREPROCEDURE EVALUATION
Anesthesia Evaluation     Patient summary reviewed and Nursing notes reviewed   no history of anesthetic complications:  NPO Solid Status: > 8 hours  NPO Liquid Status: > 2 hours           Airway   Mallampati: II  TM distance: >3 FB  Neck ROM: full  No difficulty expected  Dental - normal exam     Pulmonary - normal exam    breath sounds clear to auscultation  (+) sleep apnea on CPAP,   Cardiovascular - normal exam    ECG reviewed  Rhythm: regular  Rate: normal    (+) hypertension,       Neuro/Psych    ROS Comment: Left frontal intracranial mass a/w HA, N/V, aphasia, gate disturbance  GI/Hepatic/Renal/Endo - negative ROS     Musculoskeletal (-) negative ROS    Abdominal    Substance History - negative use     OB/GYN          Other - negative ROS                       Anesthesia Plan    ASA 3     general and Rose     ([place] during induction for hemodynamic monitoring)  intravenous induction     Anesthetic plan, risks, benefits, and alternatives have been provided, discussed and informed consent has been obtained with: patient.    Plan discussed with CRNA.        CODE STATUS:    Level Of Support Discussed With: Patient  Code Status (Patient has no pulse and is not breathing): CPR (Attempt to Resuscitate)  Medical Interventions (Patient has pulse or is breathing): Full Support

## 2022-09-21 NOTE — ANESTHESIA PROCEDURE NOTES
Airway  Urgency: elective    Date/Time: 9/21/2022 7:45 AM  Airway not difficult    General Information and Staff    Patient location during procedure: OR    Indications and Patient Condition  Indications for airway management: airway protection    Preoxygenated: yes  MILS not maintained throughout  Mask difficulty assessment: 1 - vent by mask    Final Airway Details  Final airway type: endotracheal airway      Successful airway: ETT and reinforced tube  Cuffed: yes   Successful intubation technique: video laryngoscopy  Endotracheal tube insertion site: oral  Blade: Juma  Blade size: 3  ETT size (mm): 7.5  Cormack-Lehane Classification: grade I - full view of glottis  Placement verified by: chest auscultation and capnometry   Measured from: lips  ETT/EBT  to lips (cm): 22  Number of attempts at approach: 2  Assessment: lips, teeth, and gum same as pre-op and atraumatic intubation    Additional Comments  Negative epigastric sounds, Breath sound equal bilaterally with symmetric chest rise and fall

## 2022-09-22 ENCOUNTER — APPOINTMENT (OUTPATIENT)
Dept: MRI IMAGING | Facility: HOSPITAL | Age: 52
End: 2022-09-22

## 2022-09-22 ENCOUNTER — APPOINTMENT (OUTPATIENT)
Dept: CT IMAGING | Facility: HOSPITAL | Age: 52
End: 2022-09-22

## 2022-09-22 LAB
ALBUMIN SERPL-MCNC: 3.7 G/DL (ref 3.5–5.2)
ALBUMIN/GLOB SERPL: 1.5 G/DL
ALP SERPL-CCNC: 72 U/L (ref 39–117)
ALT SERPL W P-5'-P-CCNC: 19 U/L (ref 1–41)
ANION GAP SERPL CALCULATED.3IONS-SCNC: 13 MMOL/L (ref 5–15)
AST SERPL-CCNC: 19 U/L (ref 1–40)
BASOPHILS # BLD AUTO: 0.02 10*3/MM3 (ref 0–0.2)
BASOPHILS NFR BLD AUTO: 0.1 % (ref 0–1.5)
BILIRUB SERPL-MCNC: 1.8 MG/DL (ref 0–1.2)
BUN SERPL-MCNC: 20 MG/DL (ref 6–20)
BUN/CREAT SERPL: 27.4 (ref 7–25)
CALCIUM SPEC-SCNC: 8.9 MG/DL (ref 8.6–10.5)
CHLORIDE SERPL-SCNC: 97 MMOL/L (ref 98–107)
CO2 SERPL-SCNC: 24 MMOL/L (ref 22–29)
CREAT SERPL-MCNC: 0.73 MG/DL (ref 0.76–1.27)
DEPRECATED RDW RBC AUTO: 40.8 FL (ref 37–54)
EGFRCR SERPLBLD CKD-EPI 2021: 108.8 ML/MIN/1.73
EOSINOPHIL # BLD AUTO: 0 10*3/MM3 (ref 0–0.4)
EOSINOPHIL NFR BLD AUTO: 0 % (ref 0.3–6.2)
ERYTHROCYTE [DISTWIDTH] IN BLOOD BY AUTOMATED COUNT: 12.4 % (ref 12.3–15.4)
GLOBULIN UR ELPH-MCNC: 2.4 GM/DL
GLUCOSE BLDC GLUCOMTR-MCNC: 150 MG/DL (ref 70–130)
GLUCOSE BLDC GLUCOMTR-MCNC: 160 MG/DL (ref 70–130)
GLUCOSE BLDC GLUCOMTR-MCNC: 184 MG/DL (ref 70–130)
GLUCOSE BLDC GLUCOMTR-MCNC: 186 MG/DL (ref 70–130)
GLUCOSE SERPL-MCNC: 164 MG/DL (ref 65–99)
HCT VFR BLD AUTO: 39.4 % (ref 37.5–51)
HGB BLD-MCNC: 13.8 G/DL (ref 13–17.7)
IMM GRANULOCYTES # BLD AUTO: 0.1 10*3/MM3 (ref 0–0.05)
IMM GRANULOCYTES NFR BLD AUTO: 0.6 % (ref 0–0.5)
LYMPHOCYTES # BLD AUTO: 1.04 10*3/MM3 (ref 0.7–3.1)
LYMPHOCYTES NFR BLD AUTO: 6.7 % (ref 19.6–45.3)
MAGNESIUM SERPL-MCNC: 2 MG/DL (ref 1.6–2.6)
MCH RBC QN AUTO: 31.1 PG (ref 26.6–33)
MCHC RBC AUTO-ENTMCNC: 35 G/DL (ref 31.5–35.7)
MCV RBC AUTO: 88.7 FL (ref 79–97)
MONOCYTES # BLD AUTO: 0.99 10*3/MM3 (ref 0.1–0.9)
MONOCYTES NFR BLD AUTO: 6.3 % (ref 5–12)
NEUTROPHILS NFR BLD AUTO: 13.46 10*3/MM3 (ref 1.7–7)
NEUTROPHILS NFR BLD AUTO: 86.3 % (ref 42.7–76)
NRBC BLD AUTO-RTO: 0 /100 WBC (ref 0–0.2)
PHOSPHATE SERPL-MCNC: 3.5 MG/DL (ref 2.5–4.5)
PLATELET # BLD AUTO: 252 10*3/MM3 (ref 140–450)
PMV BLD AUTO: 10.5 FL (ref 6–12)
POTASSIUM SERPL-SCNC: 4.1 MMOL/L (ref 3.5–5.2)
PROT SERPL-MCNC: 6.1 G/DL (ref 6–8.5)
RBC # BLD AUTO: 4.44 10*6/MM3 (ref 4.14–5.8)
SODIUM SERPL-SCNC: 134 MMOL/L (ref 136–145)
WBC NRBC COR # BLD: 15.61 10*3/MM3 (ref 3.4–10.8)

## 2022-09-22 PROCEDURE — 99232 SBSQ HOSP IP/OBS MODERATE 35: CPT | Performed by: INTERNAL MEDICINE

## 2022-09-22 PROCEDURE — 80053 COMPREHEN METABOLIC PANEL: CPT | Performed by: INTERNAL MEDICINE

## 2022-09-22 PROCEDURE — 25010000002 LEVETIRACETAM IN NACL 0.82% 500 MG/100ML SOLUTION: Performed by: STUDENT IN AN ORGANIZED HEALTH CARE EDUCATION/TRAINING PROGRAM

## 2022-09-22 PROCEDURE — A9577 INJ MULTIHANCE: HCPCS | Performed by: INTERNAL MEDICINE

## 2022-09-22 PROCEDURE — 70450 CT HEAD/BRAIN W/O DYE: CPT

## 2022-09-22 PROCEDURE — 84100 ASSAY OF PHOSPHORUS: CPT | Performed by: INTERNAL MEDICINE

## 2022-09-22 PROCEDURE — 63710000001 INSULIN LISPRO (HUMAN) PER 5 UNITS: Performed by: STUDENT IN AN ORGANIZED HEALTH CARE EDUCATION/TRAINING PROGRAM

## 2022-09-22 PROCEDURE — 83735 ASSAY OF MAGNESIUM: CPT | Performed by: INTERNAL MEDICINE

## 2022-09-22 PROCEDURE — 25010000002 ENOXAPARIN PER 10 MG: Performed by: STUDENT IN AN ORGANIZED HEALTH CARE EDUCATION/TRAINING PROGRAM

## 2022-09-22 PROCEDURE — 70553 MRI BRAIN STEM W/O & W/DYE: CPT

## 2022-09-22 PROCEDURE — 92523 SPEECH SOUND LANG COMPREHEN: CPT

## 2022-09-22 PROCEDURE — 82962 GLUCOSE BLOOD TEST: CPT

## 2022-09-22 PROCEDURE — 25010000002 HYDRALAZINE PER 20 MG: Performed by: STUDENT IN AN ORGANIZED HEALTH CARE EDUCATION/TRAINING PROGRAM

## 2022-09-22 PROCEDURE — 92526 ORAL FUNCTION THERAPY: CPT

## 2022-09-22 PROCEDURE — 85025 COMPLETE CBC W/AUTO DIFF WBC: CPT | Performed by: STUDENT IN AN ORGANIZED HEALTH CARE EDUCATION/TRAINING PROGRAM

## 2022-09-22 PROCEDURE — 25010000002 DEXAMETHASONE PER 1 MG: Performed by: STUDENT IN AN ORGANIZED HEALTH CARE EDUCATION/TRAINING PROGRAM

## 2022-09-22 PROCEDURE — 0 GADOBENATE DIMEGLUMINE 529 MG/ML SOLUTION: Performed by: INTERNAL MEDICINE

## 2022-09-22 RX ORDER — SODIUM CHLORIDE 1000 MG
2 TABLET, SOLUBLE MISCELLANEOUS
Status: DISCONTINUED | OUTPATIENT
Start: 2022-09-22 | End: 2022-09-26 | Stop reason: HOSPADM

## 2022-09-22 RX ADMIN — SODIUM CHLORIDE 2 G: 1 TABLET ORAL at 12:02

## 2022-09-22 RX ADMIN — DEXAMETHASONE SODIUM PHOSPHATE 4 MG: 4 INJECTION, SOLUTION INTRA-ARTICULAR; INTRALESIONAL; INTRAMUSCULAR; INTRAVENOUS; SOFT TISSUE at 20:55

## 2022-09-22 RX ADMIN — SODIUM CHLORIDE 12.5 MG/HR: 9 INJECTION, SOLUTION INTRAVENOUS at 21:13

## 2022-09-22 RX ADMIN — SODIUM CHLORIDE, POTASSIUM CHLORIDE, SODIUM LACTATE AND CALCIUM CHLORIDE 100 ML/HR: 600; 310; 30; 20 INJECTION, SOLUTION INTRAVENOUS at 20:25

## 2022-09-22 RX ADMIN — PANTOPRAZOLE SODIUM 40 MG: 40 TABLET, DELAYED RELEASE ORAL at 05:51

## 2022-09-22 RX ADMIN — DEXAMETHASONE SODIUM PHOSPHATE 4 MG: 4 INJECTION, SOLUTION INTRA-ARTICULAR; INTRALESIONAL; INTRAMUSCULAR; INTRAVENOUS; SOFT TISSUE at 07:57

## 2022-09-22 RX ADMIN — DEXAMETHASONE SODIUM PHOSPHATE 4 MG: 4 INJECTION, SOLUTION INTRA-ARTICULAR; INTRALESIONAL; INTRAMUSCULAR; INTRAVENOUS; SOFT TISSUE at 01:52

## 2022-09-22 RX ADMIN — MULTIPLE VITAMINS W/ MINERALS TAB 1 TABLET: TAB at 07:57

## 2022-09-22 RX ADMIN — SODIUM CHLORIDE 7.5 MG/HR: 9 INJECTION, SOLUTION INTRAVENOUS at 07:57

## 2022-09-22 RX ADMIN — HYDRALAZINE HYDROCHLORIDE 10 MG: 20 INJECTION INTRAMUSCULAR; INTRAVENOUS at 20:55

## 2022-09-22 RX ADMIN — INSULIN LISPRO 2 UNITS: 100 INJECTION, SOLUTION INTRAVENOUS; SUBCUTANEOUS at 12:01

## 2022-09-22 RX ADMIN — SODIUM CHLORIDE 10 MG/HR: 9 INJECTION, SOLUTION INTRAVENOUS at 23:46

## 2022-09-22 RX ADMIN — LISINOPRIL 20 MG: 20 TABLET ORAL at 07:58

## 2022-09-22 RX ADMIN — SODIUM CHLORIDE 5 MG/HR: 9 INJECTION, SOLUTION INTRAVENOUS at 16:08

## 2022-09-22 RX ADMIN — LEVETIRACETAM 500 MG: 5 INJECTION INTRAVENOUS at 20:56

## 2022-09-22 RX ADMIN — DEXAMETHASONE SODIUM PHOSPHATE 4 MG: 4 INJECTION, SOLUTION INTRA-ARTICULAR; INTRALESIONAL; INTRAMUSCULAR; INTRAVENOUS; SOFT TISSUE at 13:57

## 2022-09-22 RX ADMIN — LEVETIRACETAM 500 MG: 5 INJECTION INTRAVENOUS at 07:57

## 2022-09-22 RX ADMIN — INSULIN LISPRO 2 UNITS: 100 INJECTION, SOLUTION INTRAVENOUS; SUBCUTANEOUS at 07:58

## 2022-09-22 RX ADMIN — Medication 10 ML: at 21:00

## 2022-09-22 RX ADMIN — ENOXAPARIN SODIUM 40 MG: 40 INJECTION SUBCUTANEOUS at 20:56

## 2022-09-22 RX ADMIN — GADOBENATE DIMEGLUMINE 19 ML: 529 INJECTION, SOLUTION INTRAVENOUS at 18:22

## 2022-09-23 LAB
ANION GAP SERPL CALCULATED.3IONS-SCNC: 11 MMOL/L (ref 5–15)
BUN SERPL-MCNC: 25 MG/DL (ref 6–20)
BUN/CREAT SERPL: 35.7 (ref 7–25)
CALCIUM SPEC-SCNC: 8.6 MG/DL (ref 8.6–10.5)
CHLORIDE SERPL-SCNC: 101 MMOL/L (ref 98–107)
CO2 SERPL-SCNC: 24 MMOL/L (ref 22–29)
CREAT SERPL-MCNC: 0.7 MG/DL (ref 0.76–1.27)
EGFRCR SERPLBLD CKD-EPI 2021: 110.2 ML/MIN/1.73
GLUCOSE BLDC GLUCOMTR-MCNC: 176 MG/DL (ref 70–130)
GLUCOSE BLDC GLUCOMTR-MCNC: 188 MG/DL (ref 70–130)
GLUCOSE BLDC GLUCOMTR-MCNC: 262 MG/DL (ref 70–130)
GLUCOSE BLDC GLUCOMTR-MCNC: 271 MG/DL (ref 70–130)
GLUCOSE SERPL-MCNC: 167 MG/DL (ref 65–99)
POTASSIUM SERPL-SCNC: 4.1 MMOL/L (ref 3.5–5.2)
SODIUM SERPL-SCNC: 136 MMOL/L (ref 136–145)

## 2022-09-23 PROCEDURE — 25010000002 LEVETIRACETAM IN NACL 0.82% 500 MG/100ML SOLUTION: Performed by: STUDENT IN AN ORGANIZED HEALTH CARE EDUCATION/TRAINING PROGRAM

## 2022-09-23 PROCEDURE — 25010000002 HYDRALAZINE PER 20 MG: Performed by: STUDENT IN AN ORGANIZED HEALTH CARE EDUCATION/TRAINING PROGRAM

## 2022-09-23 PROCEDURE — 97530 THERAPEUTIC ACTIVITIES: CPT

## 2022-09-23 PROCEDURE — 80048 BASIC METABOLIC PNL TOTAL CA: CPT

## 2022-09-23 PROCEDURE — 99232 SBSQ HOSP IP/OBS MODERATE 35: CPT | Performed by: INTERNAL MEDICINE

## 2022-09-23 PROCEDURE — 97164 PT RE-EVAL EST PLAN CARE: CPT

## 2022-09-23 PROCEDURE — 82962 GLUCOSE BLOOD TEST: CPT

## 2022-09-23 PROCEDURE — 25010000002 DEXAMETHASONE PER 1 MG: Performed by: STUDENT IN AN ORGANIZED HEALTH CARE EDUCATION/TRAINING PROGRAM

## 2022-09-23 PROCEDURE — 63710000001 INSULIN LISPRO (HUMAN) PER 5 UNITS: Performed by: STUDENT IN AN ORGANIZED HEALTH CARE EDUCATION/TRAINING PROGRAM

## 2022-09-23 RX ORDER — AMLODIPINE BESYLATE 10 MG/1
10 TABLET ORAL
Status: DISCONTINUED | OUTPATIENT
Start: 2022-09-23 | End: 2022-09-26 | Stop reason: HOSPADM

## 2022-09-23 RX ADMIN — Medication 10 ML: at 20:21

## 2022-09-23 RX ADMIN — INSULIN LISPRO 4 UNITS: 100 INJECTION, SOLUTION INTRAVENOUS; SUBCUTANEOUS at 12:07

## 2022-09-23 RX ADMIN — DEXAMETHASONE SODIUM PHOSPHATE 4 MG: 4 INJECTION, SOLUTION INTRA-ARTICULAR; INTRALESIONAL; INTRAMUSCULAR; INTRAVENOUS; SOFT TISSUE at 07:54

## 2022-09-23 RX ADMIN — INSULIN LISPRO 4 UNITS: 100 INJECTION, SOLUTION INTRAVENOUS; SUBCUTANEOUS at 17:42

## 2022-09-23 RX ADMIN — ACETAMINOPHEN 650 MG: 325 TABLET, FILM COATED ORAL at 00:22

## 2022-09-23 RX ADMIN — MULTIPLE VITAMINS W/ MINERALS TAB 1 TABLET: TAB at 07:54

## 2022-09-23 RX ADMIN — INSULIN LISPRO 2 UNITS: 100 INJECTION, SOLUTION INTRAVENOUS; SUBCUTANEOUS at 07:54

## 2022-09-23 RX ADMIN — LEVETIRACETAM 500 MG: 5 INJECTION INTRAVENOUS at 07:55

## 2022-09-23 RX ADMIN — LISINOPRIL 20 MG: 20 TABLET ORAL at 07:54

## 2022-09-23 RX ADMIN — AMLODIPINE BESYLATE 10 MG: 10 TABLET ORAL at 09:51

## 2022-09-23 RX ADMIN — LEVETIRACETAM 500 MG: 5 INJECTION INTRAVENOUS at 20:21

## 2022-09-23 RX ADMIN — SODIUM CHLORIDE, POTASSIUM CHLORIDE, SODIUM LACTATE AND CALCIUM CHLORIDE 100 ML/HR: 600; 310; 30; 20 INJECTION, SOLUTION INTRAVENOUS at 07:54

## 2022-09-23 RX ADMIN — HYDRALAZINE HYDROCHLORIDE 10 MG: 20 INJECTION INTRAMUSCULAR; INTRAVENOUS at 12:48

## 2022-09-23 RX ADMIN — SODIUM CHLORIDE 2 G: 1 TABLET ORAL at 17:42

## 2022-09-23 RX ADMIN — DEXAMETHASONE SODIUM PHOSPHATE 4 MG: 4 INJECTION, SOLUTION INTRA-ARTICULAR; INTRALESIONAL; INTRAMUSCULAR; INTRAVENOUS; SOFT TISSUE at 14:42

## 2022-09-23 RX ADMIN — SODIUM CHLORIDE 2 G: 1 TABLET ORAL at 12:07

## 2022-09-23 RX ADMIN — DEXAMETHASONE SODIUM PHOSPHATE 4 MG: 4 INJECTION, SOLUTION INTRA-ARTICULAR; INTRALESIONAL; INTRAMUSCULAR; INTRAVENOUS; SOFT TISSUE at 02:54

## 2022-09-23 RX ADMIN — SODIUM CHLORIDE 2 G: 1 TABLET ORAL at 07:54

## 2022-09-23 RX ADMIN — PANTOPRAZOLE SODIUM 40 MG: 40 TABLET, DELAYED RELEASE ORAL at 06:05

## 2022-09-23 RX ADMIN — SODIUM CHLORIDE 5 MG/HR: 9 INJECTION, SOLUTION INTRAVENOUS at 06:37

## 2022-09-23 RX ADMIN — HYDRALAZINE HYDROCHLORIDE 10 MG: 20 INJECTION INTRAMUSCULAR; INTRAVENOUS at 22:08

## 2022-09-23 RX ADMIN — DEXAMETHASONE SODIUM PHOSPHATE 4 MG: 4 INJECTION, SOLUTION INTRA-ARTICULAR; INTRALESIONAL; INTRAMUSCULAR; INTRAVENOUS; SOFT TISSUE at 20:22

## 2022-09-24 LAB
ANION GAP SERPL CALCULATED.3IONS-SCNC: 14 MMOL/L (ref 5–15)
BASOPHILS # BLD AUTO: 0.09 10*3/MM3 (ref 0–0.2)
BASOPHILS NFR BLD AUTO: 0.5 % (ref 0–1.5)
BUN SERPL-MCNC: 22 MG/DL (ref 6–20)
BUN/CREAT SERPL: 35.5 (ref 7–25)
CALCIUM SPEC-SCNC: 9.1 MG/DL (ref 8.6–10.5)
CHLORIDE SERPL-SCNC: 102 MMOL/L (ref 98–107)
CO2 SERPL-SCNC: 24 MMOL/L (ref 22–29)
CREAT SERPL-MCNC: 0.62 MG/DL (ref 0.76–1.27)
DEPRECATED RDW RBC AUTO: 40.1 FL (ref 37–54)
EGFRCR SERPLBLD CKD-EPI 2021: 114.3 ML/MIN/1.73
EOSINOPHIL # BLD AUTO: 0.01 10*3/MM3 (ref 0–0.4)
EOSINOPHIL NFR BLD AUTO: 0.1 % (ref 0.3–6.2)
ERYTHROCYTE [DISTWIDTH] IN BLOOD BY AUTOMATED COUNT: 12.3 % (ref 12.3–15.4)
GLUCOSE BLDC GLUCOMTR-MCNC: 143 MG/DL (ref 70–130)
GLUCOSE BLDC GLUCOMTR-MCNC: 190 MG/DL (ref 70–130)
GLUCOSE BLDC GLUCOMTR-MCNC: 217 MG/DL (ref 70–130)
GLUCOSE BLDC GLUCOMTR-MCNC: 335 MG/DL (ref 70–130)
GLUCOSE SERPL-MCNC: 176 MG/DL (ref 65–99)
HCT VFR BLD AUTO: 42 % (ref 37.5–51)
HGB BLD-MCNC: 14.6 G/DL (ref 13–17.7)
IMM GRANULOCYTES # BLD AUTO: 0.39 10*3/MM3 (ref 0–0.05)
IMM GRANULOCYTES NFR BLD AUTO: 2.1 % (ref 0–0.5)
LYMPHOCYTES # BLD AUTO: 1.02 10*3/MM3 (ref 0.7–3.1)
LYMPHOCYTES NFR BLD AUTO: 5.6 % (ref 19.6–45.3)
MCH RBC QN AUTO: 30.9 PG (ref 26.6–33)
MCHC RBC AUTO-ENTMCNC: 34.8 G/DL (ref 31.5–35.7)
MCV RBC AUTO: 88.8 FL (ref 79–97)
MONOCYTES # BLD AUTO: 0.94 10*3/MM3 (ref 0.1–0.9)
MONOCYTES NFR BLD AUTO: 5.2 % (ref 5–12)
NEUTROPHILS NFR BLD AUTO: 15.79 10*3/MM3 (ref 1.7–7)
NEUTROPHILS NFR BLD AUTO: 86.5 % (ref 42.7–76)
NRBC BLD AUTO-RTO: 0 /100 WBC (ref 0–0.2)
PLATELET # BLD AUTO: 269 10*3/MM3 (ref 140–450)
PMV BLD AUTO: 9.7 FL (ref 6–12)
POTASSIUM SERPL-SCNC: 4.1 MMOL/L (ref 3.5–5.2)
RBC # BLD AUTO: 4.73 10*6/MM3 (ref 4.14–5.8)
SODIUM SERPL-SCNC: 140 MMOL/L (ref 136–145)
WBC NRBC COR # BLD: 18.24 10*3/MM3 (ref 3.4–10.8)

## 2022-09-24 PROCEDURE — 80048 BASIC METABOLIC PNL TOTAL CA: CPT | Performed by: INTERNAL MEDICINE

## 2022-09-24 PROCEDURE — 85025 COMPLETE CBC W/AUTO DIFF WBC: CPT | Performed by: INTERNAL MEDICINE

## 2022-09-24 PROCEDURE — 63710000001 INSULIN LISPRO (HUMAN) PER 5 UNITS: Performed by: STUDENT IN AN ORGANIZED HEALTH CARE EDUCATION/TRAINING PROGRAM

## 2022-09-24 PROCEDURE — 63710000001 INSULIN LISPRO (HUMAN) PER 5 UNITS: Performed by: NURSE PRACTITIONER

## 2022-09-24 PROCEDURE — 99232 SBSQ HOSP IP/OBS MODERATE 35: CPT | Performed by: INTERNAL MEDICINE

## 2022-09-24 PROCEDURE — 25010000002 ENOXAPARIN PER 10 MG: Performed by: STUDENT IN AN ORGANIZED HEALTH CARE EDUCATION/TRAINING PROGRAM

## 2022-09-24 PROCEDURE — 99024 POSTOP FOLLOW-UP VISIT: CPT

## 2022-09-24 PROCEDURE — 82962 GLUCOSE BLOOD TEST: CPT

## 2022-09-24 PROCEDURE — 97535 SELF CARE MNGMENT TRAINING: CPT

## 2022-09-24 PROCEDURE — 97166 OT EVAL MOD COMPLEX 45 MIN: CPT

## 2022-09-24 PROCEDURE — 25010000002 DEXAMETHASONE PER 1 MG: Performed by: STUDENT IN AN ORGANIZED HEALTH CARE EDUCATION/TRAINING PROGRAM

## 2022-09-24 PROCEDURE — 25010000002 LEVETIRACETAM IN NACL 0.82% 500 MG/100ML SOLUTION: Performed by: STUDENT IN AN ORGANIZED HEALTH CARE EDUCATION/TRAINING PROGRAM

## 2022-09-24 RX ORDER — INSULIN LISPRO 100 [IU]/ML
0-7 INJECTION, SOLUTION INTRAVENOUS; SUBCUTANEOUS
Status: DISCONTINUED | OUTPATIENT
Start: 2022-09-24 | End: 2022-09-26

## 2022-09-24 RX ADMIN — SODIUM CHLORIDE 2 G: 1 TABLET ORAL at 08:11

## 2022-09-24 RX ADMIN — DEXAMETHASONE SODIUM PHOSPHATE 4 MG: 4 INJECTION, SOLUTION INTRA-ARTICULAR; INTRALESIONAL; INTRAMUSCULAR; INTRAVENOUS; SOFT TISSUE at 14:40

## 2022-09-24 RX ADMIN — DEXAMETHASONE SODIUM PHOSPHATE 4 MG: 4 INJECTION, SOLUTION INTRA-ARTICULAR; INTRALESIONAL; INTRAMUSCULAR; INTRAVENOUS; SOFT TISSUE at 02:06

## 2022-09-24 RX ADMIN — SODIUM CHLORIDE 7.5 MG/HR: 9 INJECTION, SOLUTION INTRAVENOUS at 03:54

## 2022-09-24 RX ADMIN — ENOXAPARIN SODIUM 40 MG: 40 INJECTION SUBCUTANEOUS at 08:11

## 2022-09-24 RX ADMIN — PANTOPRAZOLE SODIUM 40 MG: 40 TABLET, DELAYED RELEASE ORAL at 05:49

## 2022-09-24 RX ADMIN — Medication 10 ML: at 20:07

## 2022-09-24 RX ADMIN — LEVETIRACETAM 500 MG: 5 INJECTION INTRAVENOUS at 08:10

## 2022-09-24 RX ADMIN — INSULIN LISPRO 5 UNITS: 100 INJECTION, SOLUTION INTRAVENOUS; SUBCUTANEOUS at 20:06

## 2022-09-24 RX ADMIN — SODIUM CHLORIDE 5 MG/HR: 9 INJECTION, SOLUTION INTRAVENOUS at 21:12

## 2022-09-24 RX ADMIN — Medication 10 ML: at 08:12

## 2022-09-24 RX ADMIN — DEXAMETHASONE SODIUM PHOSPHATE 4 MG: 4 INJECTION, SOLUTION INTRA-ARTICULAR; INTRALESIONAL; INTRAMUSCULAR; INTRAVENOUS; SOFT TISSUE at 08:12

## 2022-09-24 RX ADMIN — MULTIPLE VITAMINS W/ MINERALS TAB 1 TABLET: TAB at 08:11

## 2022-09-24 RX ADMIN — SODIUM CHLORIDE 7.5 MG/HR: 9 INJECTION, SOLUTION INTRAVENOUS at 08:08

## 2022-09-24 RX ADMIN — INSULIN LISPRO 3 UNITS: 100 INJECTION, SOLUTION INTRAVENOUS; SUBCUTANEOUS at 17:54

## 2022-09-24 RX ADMIN — DEXAMETHASONE SODIUM PHOSPHATE 4 MG: 4 INJECTION, SOLUTION INTRA-ARTICULAR; INTRALESIONAL; INTRAMUSCULAR; INTRAVENOUS; SOFT TISSUE at 20:06

## 2022-09-24 RX ADMIN — INSULIN LISPRO 2 UNITS: 100 INJECTION, SOLUTION INTRAVENOUS; SUBCUTANEOUS at 12:14

## 2022-09-24 RX ADMIN — SODIUM CHLORIDE 5 MG/HR: 9 INJECTION, SOLUTION INTRAVENOUS at 00:17

## 2022-09-24 RX ADMIN — SODIUM CHLORIDE 2 G: 1 TABLET ORAL at 12:14

## 2022-09-24 RX ADMIN — SODIUM CHLORIDE 2 G: 1 TABLET ORAL at 17:54

## 2022-09-24 RX ADMIN — LEVETIRACETAM 500 MG: 5 INJECTION INTRAVENOUS at 20:06

## 2022-09-24 RX ADMIN — LISINOPRIL 20 MG: 20 TABLET ORAL at 08:11

## 2022-09-24 RX ADMIN — AMLODIPINE BESYLATE 10 MG: 10 TABLET ORAL at 08:11

## 2022-09-25 LAB
GLUCOSE BLDC GLUCOMTR-MCNC: 150 MG/DL (ref 70–130)
GLUCOSE BLDC GLUCOMTR-MCNC: 217 MG/DL (ref 70–130)
GLUCOSE BLDC GLUCOMTR-MCNC: 283 MG/DL (ref 70–130)
GLUCOSE BLDC GLUCOMTR-MCNC: 292 MG/DL (ref 70–130)

## 2022-09-25 PROCEDURE — 99233 SBSQ HOSP IP/OBS HIGH 50: CPT | Performed by: INTERNAL MEDICINE

## 2022-09-25 PROCEDURE — 63710000001 INSULIN LISPRO (HUMAN) PER 5 UNITS: Performed by: NURSE PRACTITIONER

## 2022-09-25 PROCEDURE — 25010000002 DEXAMETHASONE PER 1 MG: Performed by: STUDENT IN AN ORGANIZED HEALTH CARE EDUCATION/TRAINING PROGRAM

## 2022-09-25 PROCEDURE — 25010000002 LEVETIRACETAM IN NACL 0.82% 500 MG/100ML SOLUTION: Performed by: STUDENT IN AN ORGANIZED HEALTH CARE EDUCATION/TRAINING PROGRAM

## 2022-09-25 PROCEDURE — 99024 POSTOP FOLLOW-UP VISIT: CPT

## 2022-09-25 PROCEDURE — 25010000002 ENOXAPARIN PER 10 MG: Performed by: STUDENT IN AN ORGANIZED HEALTH CARE EDUCATION/TRAINING PROGRAM

## 2022-09-25 PROCEDURE — 82962 GLUCOSE BLOOD TEST: CPT

## 2022-09-25 RX ORDER — HYDRALAZINE HYDROCHLORIDE 10 MG/1
10 TABLET, FILM COATED ORAL EVERY 8 HOURS SCHEDULED
Status: DISCONTINUED | OUTPATIENT
Start: 2022-09-25 | End: 2022-09-26 | Stop reason: HOSPADM

## 2022-09-25 RX ADMIN — LEVETIRACETAM 500 MG: 5 INJECTION INTRAVENOUS at 08:08

## 2022-09-25 RX ADMIN — HYDRALAZINE HYDROCHLORIDE 10 MG: 10 TABLET ORAL at 15:59

## 2022-09-25 RX ADMIN — DEXAMETHASONE SODIUM PHOSPHATE 4 MG: 4 INJECTION, SOLUTION INTRA-ARTICULAR; INTRALESIONAL; INTRAMUSCULAR; INTRAVENOUS; SOFT TISSUE at 02:12

## 2022-09-25 RX ADMIN — AMLODIPINE BESYLATE 10 MG: 10 TABLET ORAL at 08:08

## 2022-09-25 RX ADMIN — INSULIN LISPRO 4 UNITS: 100 INJECTION, SOLUTION INTRAVENOUS; SUBCUTANEOUS at 17:25

## 2022-09-25 RX ADMIN — HYDRALAZINE HYDROCHLORIDE 10 MG: 10 TABLET ORAL at 22:54

## 2022-09-25 RX ADMIN — INSULIN LISPRO 4 UNITS: 100 INJECTION, SOLUTION INTRAVENOUS; SUBCUTANEOUS at 20:22

## 2022-09-25 RX ADMIN — MULTIPLE VITAMINS W/ MINERALS TAB 1 TABLET: TAB at 08:08

## 2022-09-25 RX ADMIN — DEXAMETHASONE SODIUM PHOSPHATE 4 MG: 4 INJECTION, SOLUTION INTRA-ARTICULAR; INTRALESIONAL; INTRAMUSCULAR; INTRAVENOUS; SOFT TISSUE at 08:07

## 2022-09-25 RX ADMIN — SODIUM CHLORIDE 2 G: 1 TABLET ORAL at 08:08

## 2022-09-25 RX ADMIN — PANTOPRAZOLE SODIUM 40 MG: 40 TABLET, DELAYED RELEASE ORAL at 06:20

## 2022-09-25 RX ADMIN — Medication 10 ML: at 20:22

## 2022-09-25 RX ADMIN — DEXAMETHASONE SODIUM PHOSPHATE 4 MG: 4 INJECTION, SOLUTION INTRA-ARTICULAR; INTRALESIONAL; INTRAMUSCULAR; INTRAVENOUS; SOFT TISSUE at 14:14

## 2022-09-25 RX ADMIN — LEVETIRACETAM 500 MG: 5 INJECTION INTRAVENOUS at 20:22

## 2022-09-25 RX ADMIN — ENOXAPARIN SODIUM 40 MG: 40 INJECTION SUBCUTANEOUS at 08:07

## 2022-09-25 RX ADMIN — SODIUM CHLORIDE 5 MG/HR: 9 INJECTION, SOLUTION INTRAVENOUS at 02:52

## 2022-09-25 RX ADMIN — DEXAMETHASONE SODIUM PHOSPHATE 4 MG: 4 INJECTION, SOLUTION INTRA-ARTICULAR; INTRALESIONAL; INTRAMUSCULAR; INTRAVENOUS; SOFT TISSUE at 20:22

## 2022-09-25 RX ADMIN — LISINOPRIL 20 MG: 20 TABLET ORAL at 08:08

## 2022-09-25 RX ADMIN — INSULIN LISPRO 2 UNITS: 100 INJECTION, SOLUTION INTRAVENOUS; SUBCUTANEOUS at 08:07

## 2022-09-25 RX ADMIN — INSULIN LISPRO 3 UNITS: 100 INJECTION, SOLUTION INTRAVENOUS; SUBCUTANEOUS at 12:10

## 2022-09-25 RX ADMIN — SODIUM CHLORIDE 2 G: 1 TABLET ORAL at 17:25

## 2022-09-25 RX ADMIN — SODIUM CHLORIDE 2 G: 1 TABLET ORAL at 12:10

## 2022-09-25 RX ADMIN — Medication 10 ML: at 08:08

## 2022-09-26 ENCOUNTER — READMISSION MANAGEMENT (OUTPATIENT)
Dept: CALL CENTER | Facility: HOSPITAL | Age: 52
End: 2022-09-26

## 2022-09-26 VITALS
DIASTOLIC BLOOD PRESSURE: 94 MMHG | HEART RATE: 60 BPM | RESPIRATION RATE: 16 BRPM | WEIGHT: 212 LBS | TEMPERATURE: 98.3 F | SYSTOLIC BLOOD PRESSURE: 160 MMHG | HEIGHT: 72 IN | OXYGEN SATURATION: 91 % | BODY MASS INDEX: 28.71 KG/M2

## 2022-09-26 PROBLEM — C71.9 GLIOBLASTOMA: Status: ACTIVE | Noted: 2022-09-17

## 2022-09-26 LAB — GLUCOSE BLDC GLUCOMTR-MCNC: 163 MG/DL (ref 70–130)

## 2022-09-26 PROCEDURE — 25010000002 DEXAMETHASONE PER 1 MG: Performed by: STUDENT IN AN ORGANIZED HEALTH CARE EDUCATION/TRAINING PROGRAM

## 2022-09-26 PROCEDURE — 25010000002 LEVETIRACETAM IN NACL 0.82% 500 MG/100ML SOLUTION: Performed by: STUDENT IN AN ORGANIZED HEALTH CARE EDUCATION/TRAINING PROGRAM

## 2022-09-26 PROCEDURE — 82962 GLUCOSE BLOOD TEST: CPT

## 2022-09-26 PROCEDURE — 92507 TX SP LANG VOICE COMM INDIV: CPT

## 2022-09-26 PROCEDURE — 63710000001 INSULIN LISPRO (HUMAN) PER 5 UNITS: Performed by: NURSE PRACTITIONER

## 2022-09-26 PROCEDURE — 25010000002 ENOXAPARIN PER 10 MG: Performed by: STUDENT IN AN ORGANIZED HEALTH CARE EDUCATION/TRAINING PROGRAM

## 2022-09-26 PROCEDURE — 99238 HOSP IP/OBS DSCHRG MGMT 30/<: CPT | Performed by: INTERNAL MEDICINE

## 2022-09-26 RX ORDER — LEVETIRACETAM 500 MG/1
500 TABLET ORAL 2 TIMES DAILY
Qty: 60 TABLET | Refills: 1 | Status: SHIPPED | OUTPATIENT
Start: 2022-09-26 | End: 2023-01-09

## 2022-09-26 RX ORDER — DEXAMETHASONE SODIUM PHOSPHATE 4 MG/ML
4 INJECTION, SOLUTION INTRA-ARTICULAR; INTRALESIONAL; INTRAMUSCULAR; INTRAVENOUS; SOFT TISSUE 2 TIMES DAILY
Status: DISCONTINUED | OUTPATIENT
Start: 2022-09-26 | End: 2022-09-26 | Stop reason: HOSPADM

## 2022-09-26 RX ORDER — AMLODIPINE BESYLATE 10 MG/1
10 TABLET ORAL
Qty: 30 TABLET | Refills: 1 | Status: SHIPPED | OUTPATIENT
Start: 2022-09-27 | End: 2022-11-21

## 2022-09-26 RX ORDER — DEXAMETHASONE 2 MG/1
TABLET ORAL
Qty: 35 TABLET | Refills: 0 | Status: SHIPPED | OUTPATIENT
Start: 2022-09-26 | End: 2022-10-25

## 2022-09-26 RX ORDER — MULTIPLE VITAMINS W/ MINERALS TAB 9MG-400MCG
1 TAB ORAL DAILY
Qty: 90 EACH | Refills: 0 | Status: SHIPPED | OUTPATIENT
Start: 2022-09-26 | End: 2023-01-09

## 2022-09-26 RX ORDER — LEVETIRACETAM 500 MG/1
500 TABLET ORAL 2 TIMES DAILY
Status: DISCONTINUED | OUTPATIENT
Start: 2022-09-26 | End: 2022-09-26 | Stop reason: HOSPADM

## 2022-09-26 RX ADMIN — ENOXAPARIN SODIUM 40 MG: 40 INJECTION SUBCUTANEOUS at 08:40

## 2022-09-26 RX ADMIN — Medication 10 ML: at 08:41

## 2022-09-26 RX ADMIN — DEXAMETHASONE SODIUM PHOSPHATE 4 MG: 4 INJECTION, SOLUTION INTRA-ARTICULAR; INTRALESIONAL; INTRAMUSCULAR; INTRAVENOUS; SOFT TISSUE at 08:43

## 2022-09-26 RX ADMIN — PANTOPRAZOLE SODIUM 40 MG: 40 TABLET, DELAYED RELEASE ORAL at 06:09

## 2022-09-26 RX ADMIN — MULTIPLE VITAMINS W/ MINERALS TAB 1 TABLET: TAB at 08:40

## 2022-09-26 RX ADMIN — DEXAMETHASONE SODIUM PHOSPHATE 4 MG: 4 INJECTION, SOLUTION INTRA-ARTICULAR; INTRALESIONAL; INTRAMUSCULAR; INTRAVENOUS; SOFT TISSUE at 02:52

## 2022-09-26 RX ADMIN — SODIUM CHLORIDE 2 G: 1 TABLET ORAL at 08:45

## 2022-09-26 RX ADMIN — AMLODIPINE BESYLATE 10 MG: 10 TABLET ORAL at 08:40

## 2022-09-26 RX ADMIN — LEVETIRACETAM 500 MG: 5 INJECTION INTRAVENOUS at 08:41

## 2022-09-26 RX ADMIN — INSULIN LISPRO 2 UNITS: 100 INJECTION, SOLUTION INTRAVENOUS; SUBCUTANEOUS at 08:40

## 2022-09-26 RX ADMIN — LISINOPRIL 20 MG: 20 TABLET ORAL at 08:40

## 2022-09-26 RX ADMIN — HYDRALAZINE HYDROCHLORIDE 10 MG: 10 TABLET ORAL at 06:09

## 2022-09-27 NOTE — OUTREACH NOTE
Prep Survey    Flowsheet Row Responses   [facility]? [place]   Is LACE score < 7 ? No   Emergency Room discharge w/ pulse ox? 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 General Surgery   Does the patient have Home health ordered? Yes   What is the Home health agency?  [facility]    Is there a DME ordered? No   Prep survey completed? Yes          NIRMALA ABBOTT - Registered Nurse

## 2022-09-28 ENCOUNTER — HOME HEALTH ADMISSION (OUTPATIENT)
Dept: HOME HEALTH SERVICES | Facility: HOME HEALTHCARE | Age: 52
End: 2022-09-28

## 2022-09-29 ENCOUNTER — READMISSION MANAGEMENT (OUTPATIENT)
Dept: CALL CENTER | Facility: HOSPITAL | Age: 52
End: 2022-09-29

## 2022-09-29 NOTE — OUTREACH NOTE
General Surgery Week 1 Survey    Flowsheet Row Responses   [facility] patient discharged from? [place]   Does the patient have one of the following disease processes/diagnoses(primary or secondary)? General Surgery   Week 1 attempt successful? 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 Yes   Is the patient having any side effects they believe may be caused by any medication additions or changes? No   Does the patient have all medications related to this admission filled (includes all antibiotics, pain medications, etc.) Yes   Is the patient taking all medications as directed (includes completed medication regime)? Yes   Does the patient have a follow up appointment scheduled with their surgeon? Yes  [10/10/22]   Has the patient kept scheduled appointments due by today? N/A   Comments PCP appt on 10/4/22   What is the Home health agency?  [facility]    Has home health visited the patient within 72 hours of discharge? Yes   Psychosocial issues? No   Did the patient receive a copy of their discharge instructions? Yes   Nursing interventions Reviewed instructions with patient   What is the patient's perception of their health status since discharge? Improving   Nursing interventions Nurse provided patient education   Is the patient /caregiver able to teach back basic post-op care? Keep incision areas clean,dry and protected, Take showers only when approved by MD-sponge bathe until then, Lifting as instructed by MD in discharge instructions   Is the patient/caregiver able to teach back signs and symptoms of incisional infection? Increased redness, swelling or pain at the incisonal site, Increased drainage or bleeding   Is the patient/caregiver able to teach back steps to recovery at home? Set small, achievable  goals for return to baseline health, Rest and rebuild strength, gradually increase activity   Is the patient/caregiver able to teach back the hierarchy of who to call/visit for symptoms/problems? PCP, Specialist, Home health nurse, Urgent Care, ED, 911 Yes   Week 1 call completed? Yes          KATHY GAMINO - Registered Nurse

## 2022-10-07 ENCOUNTER — READMISSION MANAGEMENT (OUTPATIENT)
Dept: CALL CENTER | Facility: HOSPITAL | Age: 52
End: 2022-10-07

## 2022-10-07 NOTE — OUTREACH NOTE
General Surgery Week 2 Survey    Flowsheet Row Responses   [facility] patient discharged from? [place]   Does the patient have one of the following disease processes/diagnoses(primary or secondary)? General Surgery   Week 2 attempt successful? Yes   Call start time 1049   Call end time 1055   Meds reviewed with patient/caregiver? Yes   Is the patient having any side effects they believe may be caused by any medication additions or changes? No   Does the patient have all medications related to this admission filled (includes all antibiotics, pain medications, etc.) Yes   Is the patient taking all medications as directed (includes completed medication regime)? Yes   Does the patient have a follow up appointment scheduled with their surgeon? Yes   Has the patient kept scheduled appointments due by today? Yes   Comments Surgeon appt 10/10/22.   Has home health visited the patient within 72 hours of discharge? Yes   Psychosocial issues? No   What is the patient's perception of their health status since discharge? Improving   Nursing interventions Nurse provided patient education   Is the patient/caregiver able to teach back signs and symptoms of incisional infection? Increased redness, swelling or pain at the incisonal site, Incisional warmth, Fever, Increased drainage or bleeding, Pus or odor from incision   Is the patient/caregiver able to teach back steps to recovery at home? Eat a well-balance diet, Set small, achievable goals for return to baseline health, Rest and rebuild strength, gradually increase activity, Make a list of questions for surgeon's appointment, Practice good oral hygiene   If the patient is a current smoker, are they able to teach back resources for cessation? Not a smoker   Is the patient/caregiver able to teach back the hierarchy of who to call/visit for symptoms/problems? PCP, Specialist, Home health nurse, Urgent Care, ED, 911 Yes   Week 2 call completed? Yes   Graduated/Revoked  comments Patient \[facility] [place] [facility]\"" is doing well. Denies any s/s of infection at incision. [facility] [place] [facility] has good appetite.  continues to visit. [facility] [place] will see surgeon on Monday. Denies any needs/concerns today.          RHINA CARDONA - Registered Nurse

## 2022-10-10 ENCOUNTER — OFFICE VISIT (OUTPATIENT)
Dept: ONCOLOGY | Facility: CLINIC | Age: 52
End: 2022-10-10

## 2022-10-10 ENCOUNTER — HOSPITAL ENCOUNTER (OUTPATIENT)
Dept: RADIATION ONCOLOGY | Facility: HOSPITAL | Age: 52
Setting detail: RADIATION/ONCOLOGY SERIES
Discharge: HOME | End: 2022-10-10

## 2022-10-10 ENCOUNTER — OFFICE VISIT (OUTPATIENT)
Dept: RADIATION ONCOLOGY | Facility: HOSPITAL | Age: 52
End: 2022-10-10

## 2022-10-10 ENCOUNTER — OFFICE VISIT (OUTPATIENT)
Dept: NEUROSURGERY | Facility: CLINIC | Age: 52
End: 2022-10-10

## 2022-10-10 VITALS
WEIGHT: 213.3 LBS | OXYGEN SATURATION: 96 % | HEART RATE: 66 BPM | BODY MASS INDEX: 28.92 KG/M2 | DIASTOLIC BLOOD PRESSURE: 75 MMHG | TEMPERATURE: 97.9 F | RESPIRATION RATE: 16 BRPM | SYSTOLIC BLOOD PRESSURE: 119 MMHG

## 2022-10-10 VITALS
HEIGHT: 72 IN | BODY MASS INDEX: 28.96 KG/M2 | SYSTOLIC BLOOD PRESSURE: 114 MMHG | WEIGHT: 213.8 LBS | RESPIRATION RATE: 16 BRPM | DIASTOLIC BLOOD PRESSURE: 72 MMHG

## 2022-10-10 VITALS
RESPIRATION RATE: 16 BRPM | SYSTOLIC BLOOD PRESSURE: 118 MMHG | HEART RATE: 70 BPM | HEIGHT: 72 IN | TEMPERATURE: 97.1 F | WEIGHT: 213 LBS | OXYGEN SATURATION: 98 % | DIASTOLIC BLOOD PRESSURE: 78 MMHG | BODY MASS INDEX: 28.85 KG/M2

## 2022-10-10 DIAGNOSIS — C71.9 GLIOBLASTOMA: Primary | ICD-10-CM

## 2022-10-10 LAB
CYTO UR: NORMAL
LAB AP CASE REPORT: NORMAL
LAB AP CLINICAL INFORMATION: NORMAL
LAB AP DIAGNOSIS COMMENT: NORMAL
Lab: NORMAL
PATH REPORT.ADDENDUM SPEC: NORMAL
PATH REPORT.FINAL DX SPEC: NORMAL
PATH REPORT.GROSS SPEC: NORMAL

## 2022-10-10 PROCEDURE — 99214 OFFICE O/P EST MOD 30 MIN: CPT | Performed by: INTERNAL MEDICINE

## 2022-10-10 PROCEDURE — 99024 POSTOP FOLLOW-UP VISIT: CPT | Performed by: STUDENT IN AN ORGANIZED HEALTH CARE EDUCATION/TRAINING PROGRAM

## 2022-10-10 PROCEDURE — G0463 HOSPITAL OUTPT CLINIC VISIT: HCPCS

## 2022-10-10 RX ORDER — SULFAMETHOXAZOLE AND TRIMETHOPRIM 800; 160 MG/1; MG/1
1 TABLET ORAL 3 TIMES WEEKLY
Qty: 12 TABLET | Refills: 7 | Status: SHIPPED | OUTPATIENT
Start: 2022-10-10 | End: 2023-01-09

## 2022-10-10 RX ORDER — ONDANSETRON HYDROCHLORIDE 8 MG/1
8 TABLET, FILM COATED ORAL 3 TIMES DAILY PRN
Qty: 30 TABLET | Refills: 5 | Status: SHIPPED | OUTPATIENT
Start: 2022-10-10

## 2022-10-10 NOTE — PROGRESS NOTES
"NEUROSURGERY PROGRESS NOTE    Chief Complaint: Status post resection of a left frontal glioblastoma    Subjective: This is a 51-year-old male who underwent resection of a large left frontal glioblastoma approximately 2-1/2 weeks ago.  He comes in today for follow-up.  Overall, the patient is doing extremely well.  He is at home and performing activities independently.  He denies any significant headache, vision changes.  He has very rare occasions of word finding difficulties, but otherwise his speech has been completely intact.    Objective    Vital Signs: Blood pressure 114/72, resp. rate 16, height 182.9 cm (72\"), weight 97 kg (213 lb 12.8 oz).    Physical Exam  Awake, alert and oriented x 3  Opens eyes spont  Pupils 3 mm rx bilat  Extraocular muscles intact bilaterally  Face symmetric bilaterally  Tongue midline  5/5 in all 4 ext  No pronator drift  Incision clean dry and intact    Current Medications:   Current Outpatient Medications:   •  amLODIPine (NORVASC) 10 MG tablet, Take 1 tablet by mouth Daily., Disp: 30 tablet, Rfl: 1  •  dexamethasone (DECADRON) 2 MG tablet, Dexamethasone 4 mg BID x 5 days, then 2 mg BID x 5 days, then 2 mg once a day, then stop., Disp: 35 tablet, Rfl: 0  •  levETIRAcetam (KEPPRA) 500 MG tablet, Take 1 tablet by mouth 2 (Two) Times a Day., Disp: 60 tablet, Rfl: 1  •  lisinopril (PRINIVIL,ZESTRIL) 20 MG tablet, Take 20 mg by mouth Daily., Disp: , Rfl:   •  multivitamin with minerals tablet tablet, Take 1 tablet by mouth Daily., Disp: 90 each, Rfl: 0  •  niacin 500 MG tablet, Take 500 mg by mouth Every Night., Disp: , Rfl:   •  VASCEPA 1 g capsule capsule, , Disp: , Rfl:   •  sodium-potassium-magnesium sulfates (SUPREP) 17.5-3.13-1.6 GM/177ML solution oral solution, Take 2 bottles by mouth 1 (One) Time for 1 dose. Please see prep instructions from office, Disp: 354 mL, Rfl: 0     Laboratory Results:                              Brief Urine Lab Results     None        Microbiology " Results (last 10 days)     ** No results found for the last 240 hours. **          Diagnostic Imaging: I reviewed and independently interpreted the new imaging.     Assessment/Plan:  This is a 51-year-old male status post resection of a large left frontal glioblastoma approximately 2 and half weeks ago.  He is here today for follow-up and doing quite well.  He is at home and performing activities independently.  He has minimal headache or any other neurological issues.  He is on Keppra and is weaning his Decadron off.  He already has a scheduled oncology follow-up today.  I will defer to them for management of his adjuvant therapy, but I would like them to hold off on chemo and radiation until he is 4 weeks out from his operation.  I will plan to see him back in clinic in approximately 1 month.     Diagnoses and all orders for this visit:    1. Glioblastoma (HCC) (Primary)        Umair Patton MD  10/10/22  14:15 EDT    Answers for HPI/ROS submitted by the patient on 10/10/2022  What is the primary reason for your visit?: Other  Please describe your symptoms.: Follow post op/ removal of staples  Have you had these symptoms before?: No  How long have you been having these symptoms?: 1-4 days  Please list any medications you are currently taking for this condition.: Keppra, Decadron

## 2022-10-10 NOTE — PROGRESS NOTES
"DATE OF VISIT: 10/10/2022    REASON FOR VISIT: Followup for left frontal lobe GBM     PROBLEM LIST:  1.  Left frontal lobe GBM:  A.  Presented with headaches and slurred speech  B.  Diagnosed after craniotomy done by Dr. Beach September 20, 2022  2.  Hypertension    HISTORY OF PRESENT ILLNESS: The patient is a very pleasant 51 y.o. male  with past medical history significant for GBM diagnosed September 20, 2022. The  patient is here today for scheduled follow-up visit.    SUBJECTIVE: The patient is here today with his  and sister.  He is recovering well from surgery.  Denies any headaches.    Past History:  Medical History: has a past medical history of Diverticulitis, High triglycerides, Hyperlipidemia, Hypertension, and Sleep apnea.   Surgical History: has a past surgical history that includes Eye surgery (Bilateral, 2008); Skin surgery (2010); Colonoscopy; Craniotomy for Tumor (N/A, 09/21/2022); and Craniotomy (08/21/2022).   Family History: family history includes Cancer in his mother; Heart attack in his mother; Hyperlipidemia in his father; Hypertension in his brother; Stroke in his father.   Social History: reports that he has quit smoking. His smoking use included cigarettes. His smokeless tobacco use includes snuff. He reports that he does not currently use alcohol. He reports that he does not use drugs.    (Not in a hospital admission)     Allergies: Patient has no known allergies.     Review of Systems   Constitutional: Positive for fatigue.   Respiratory: Negative.    Cardiovascular: Negative.    Gastrointestinal: Negative.    Psychiatric/Behavioral: The patient is nervous/anxious.        PHYSICAL EXAMINATION:   /78   Pulse 70   Temp 97.1 °F (36.2 °C) (Temporal)   Resp 16   Ht 182.9 cm (72.01\")   Wt 96.6 kg (213 lb)   SpO2 98%   BMI 28.88 kg/m²    Pain Score    10/10/22 1453   PainSc: 0-No pain        ECOG score: 0            ECOG Performance Status: 1 - Symptomatic but completely " ambulatory      General Appearance:      alert, cooperative, no apparent distress and appears stated age   Lungs:   Clear to auscultation bilaterally; respirations regular, even, and unlabored bilaterally   Heart:  Regular rate and rhythm, no murmurs appreciated   Abdomen:   Soft, non-tender, non-distended and no organomegaly                 No visits with results within 2 Week(s) from this visit.   Latest known visit with results is:   No results displayed because visit has over 200 results.           CT Abdomen Pelvis Without Contrast    Result Date: 9/17/2022  Narrative: CT chest without IV contrast CT abdomen pelvis without IV contrast INDICATION: Newly diagnosed brain tumor COMPARISON: None available. PROCEDURE:  Multiple axial CT images were obtained of the chest, abdomen, and pelvis after IV administration of contrast.   Coronal and sagittal reformations were also obtained. CT dose lowering techniques were used, to include: automated exposure control, adjustment for patient size, and or use of iterative reconstruction. FINDINGS: CHEST: Cardiovascular: No pericardial fluid collection. Atherosclerotic calcifications are seen in the aorta and branching vessels including in the coronary arteries. Mediastinum/Pushpa: No significant mediastinal or hilar adenopathy is seen. Lungs/Pleura :  No focal consolidation, pneumothorax, or pleural effusion seen. Chest wall and Axilla: Unremarkable. Bones of the chest:  No acute focal bony abnormality seen. ABDOMEN:  Liver:  Diffusely low-attenuation. There is a 12 mm diameter fluid attenuation lesion in the liver near the gallbladder, consistent with a cyst. Gallbladder:  Distended gallbladder. No additional gallbladder abnormality by CT. Spleen:  Normal size without focal abnormality. Pancreas:  No focal lesion or pancreatic ductal dilatation. Adrenal glands:  Normal in appearance. Kidneys and ureters:  Without evidence of acute hydronephrosis, solid mass lesion, or stone.  No  stones are seen in the bilateral ureters as well. Gastrointestinal tract:  Normal in caliber without evidence of obstruction, focal inflammation, or mass lesion. Normal appendix. There are colonic diverticula without visible acute diverticulitis. Abdominal aorta/Inferior vena cava: Atherosclerotic calcifications are seen in the aorta and branching vessels. IVC normal Lymph nodes:  No significant lymphadenopathy. Abdominal wall: Small bilateral fat-containing inguinal hernias. Small fat-containing umbilical hernia. PELVIS: Urinary bladder: Without focal lesion or wall thickening.  No stones seen. Reproductive organs: Small amount of fluid in the scrotum bilaterally. BONES OF THE ABDOMEN AND PELVIS:  No acute focal bony abnormality seen. There are multilevel spinal degenerative changes.     Impression: Somewhat limited exam due to lack of IV contrast. No specific site of malignancy seen. Distended gallbladder without additional gallbladder abnormality by CT. Hepatic steatosis. Colonic diverticulosis without visible acute diverticulitis. Small amount of fluid in the scrotum bilaterally, likely small hydroceles. Atherosclerosis including coronary calcifications. Small bilateral fat-containing inguinal hernias. Small fat-containing umbilical hernia. Electronically signed by:  Daniel Castillo M.D.  9/17/2022 6:42 PM Mountain Time    CT Head Without Contrast    Result Date: 9/22/2022  Narrative: DATE OF EXAM: 9/22/2022 5:36 AM  PROCEDURE: CT HEAD WO CONTRAST-  INDICATIONS: s/p tumor resection; G93.89-Other specified disorders of brain; D33.0-Benign neoplasm of brain, supratentorial; R47.9-Unspecified speech disturbances; R26.9-Unspecified abnormalities of gait and mobility  COMPARISON: One day prior  TECHNIQUE: Routine transaxial and coronal reconstruction images were obtained through the head without the administration of contrast. Automated exposure control and iterative reconstruction methods were used.  The radiation  dose reduction device was turned on for each scan per the ALARA (As Low as Reasonably Achievable) protocol.  FINDINGS: Redemonstrated postoperative changes from recent left frontal craniotomy for resection of presumed high-grade glioma. There is unchanged hyperdensity along the resection cavity margins, likely combination of minimal blood products and postoperative change. Previously noted air within the dural venous sinuses is essentially resolved, with some persistent mixed extra-axial collection present along the craniotomy margins containing both air and likely minimal blood products. There is unchanged left-sided cerebral edema and persisting rightward midline shift. Some hypoattenuation along the left temporal pole is more conspicuous than on prior exam, concerning for some ischemia/devitalized tissue.      Impression: Continued expected evolution of postoperative changes from recent left frontal craniotomy for resection of large centrally necrotic enhancing mass. There is improved pneumocephalus, including resolution of previously noted air within the venous sinuses. There is no new hemorrhage or increased mass effect. Increased conspicuity of some hypoattenuation along the left temporal pole, concerning for evolving ischemia/small area of devitalized tissue.  This report was finalized on 9/22/2022 7:41 AM by Jeet Lemus.      CT Head Without Contrast    Result Date: 9/21/2022  Narrative: DATE OF EXAM: 9/21/2022 3:57 PM  PROCEDURE: CT HEAD WO CONTRAST-  INDICATIONS: Post Op Craniotomoy Evaluation; G93.89-Other specified disorders of brain; D33.0-Benign neoplasm of brain, supratentorial; R47.9-Unspecified speech disturbances; R26.9-Unspecified abnormalities of gait and mobility  COMPARISON: MRI 9/17/2022  TECHNIQUE: Routine transaxial and coronal reconstruction images were obtained through the head without the administration of contrast. Automated exposure control and iterative reconstruction methods were  used.  The radiation dose reduction device was turned on for each scan per the ALARA (As Low as Reasonably Achievable) protocol.  FINDINGS: Postoperative changes are noted from interval left frontal craniotomy for resection of previously noted centrally necrotic large left frontal mass. There is expected minimal hyperdense blood products and fluid within the resection cavity and along the craniotomy margin, without evidence of unexpected hemorrhage or new mass effect. Midline shift is mildly improved from preoperative comparison, currently around 11 mm, previously 14 mm. There is also air noted within the superior sagittal sinus, extending down towards the torcula. The calvarium is otherwise intact. There is no new mass or mass effect otherwise. There is persistent vasogenic edema noted throughout the left frontal lobe, similar to prior.      Impression: Expected postoperative appearance following left frontal craniotomy for resection of previously noted large enhancing centrally necrotic left frontal lobe mass. Additionally, there is air noted within the dural venous sinuses, most notably within the superior sagittal sinus, a finding of uncertain clinical significance, with attention recommended on anticipated follow-up. Mass effect is overall improved and there is otherwise no unexpected hemorrhage, ischemia or new mass effect.  This report was finalized on 9/21/2022 5:52 PM by Jeet Lemus.      CT Chest Without Contrast Diagnostic    Result Date: 9/17/2022  Narrative: CT chest without IV contrast CT abdomen pelvis without IV contrast INDICATION: Newly diagnosed brain tumor COMPARISON: None available. PROCEDURE:  Multiple axial CT images were obtained of the chest, abdomen, and pelvis after IV administration of contrast.   Coronal and sagittal reformations were also obtained. CT dose lowering techniques were used, to include: automated exposure control, adjustment for patient size, and or use of iterative  reconstruction. FINDINGS: CHEST: Cardiovascular: No pericardial fluid collection. Atherosclerotic calcifications are seen in the aorta and branching vessels including in the coronary arteries. Mediastinum/Pushpa: No significant mediastinal or hilar adenopathy is seen. Lungs/Pleura :  No focal consolidation, pneumothorax, or pleural effusion seen. Chest wall and Axilla: Unremarkable. Bones of the chest:  No acute focal bony abnormality seen. ABDOMEN:  Liver:  Diffusely low-attenuation. There is a 12 mm diameter fluid attenuation lesion in the liver near the gallbladder, consistent with a cyst. Gallbladder:  Distended gallbladder. No additional gallbladder abnormality by CT. Spleen:  Normal size without focal abnormality. Pancreas:  No focal lesion or pancreatic ductal dilatation. Adrenal glands:  Normal in appearance. Kidneys and ureters:  Without evidence of acute hydronephrosis, solid mass lesion, or stone.  No stones are seen in the bilateral ureters as well. Gastrointestinal tract:  Normal in caliber without evidence of obstruction, focal inflammation, or mass lesion. Normal appendix. There are colonic diverticula without visible acute diverticulitis. Abdominal aorta/Inferior vena cava: Atherosclerotic calcifications are seen in the aorta and branching vessels. IVC normal Lymph nodes:  No significant lymphadenopathy. Abdominal wall: Small bilateral fat-containing inguinal hernias. Small fat-containing umbilical hernia. PELVIS: Urinary bladder: Without focal lesion or wall thickening.  No stones seen. Reproductive organs: Small amount of fluid in the scrotum bilaterally. BONES OF THE ABDOMEN AND PELVIS:  No acute focal bony abnormality seen. There are multilevel spinal degenerative changes.     Impression: Somewhat limited exam due to lack of IV contrast. No specific site of malignancy seen. Distended gallbladder without additional gallbladder abnormality by CT. Hepatic steatosis. Colonic diverticulosis without  visible acute diverticulitis. Small amount of fluid in the scrotum bilaterally, likely small hydroceles. Atherosclerosis including coronary calcifications. Small bilateral fat-containing inguinal hernias. Small fat-containing umbilical hernia. Electronically signed by:  Daniel Castillo M.D.  9/17/2022 6:42 PM Mountain Time    MRI Brain With & Without Contrast    Result Date: 9/22/2022  Narrative: Examination: MRI BRAIN W WO CONTRAST-  Date of Exam: 9/22/2022 5:36 PM  Indication: s/p tumor resection; G93.89-Other specified disorders of brain; D33.0-Benign neoplasm of brain, supratentorial; R47.9-Unspecified speech disturbances; R26.9-Unspecified abnormalities of gait and mobility.  Comparison: 09/17/2022.  Technique: Standard MR pulse sequences of the brain were obtained before and after the IV administration of 19 mL MultiHance IV contrast.  Findings: There has been interval development of diffusion restriction within the anterior left temporal lobe associated with cortical thickening and edema signal on T2 imaging consistent with acute infarction. There is a similar smaller focus of diffusion restriction and cytotoxic edema within the parasagittal anterior right frontal lobe.  There are evolving changes of left frontal craniotomy and resection of large necrotic enhancing tumor from the left frontal lobe. The resection cavity is filled with heterogeneous material and is retractile compared with the preoperative scan. There is significantly diminished mass effect and slightly diminished vasogenic edema throughout the left frontal lobe. There is persistent rightward shift of midline structures by up to 9 mm compared with 22 mm presurgical. There is a small mixed density left frontal subdural collection along the craniotomy undersurface. This measures up to 10 mm thick and is not unexpected following surgical intervention. There is intrinsic T1 hyperintense signal at the periphery of the resection cavity, which does  limit evaluation for enhancement. No true enhancement is appreciated. There is persistent effacement of the left lateral ventricle frontal horn and partial effacement of the remainder of the left lateral ventricle. No evidence of acute hydrocephalus. Intracranial vasculature appears grossly patent. Dural venous sinuses are patent. There is scalp edema in the left frontal, parietal and temporal areas. No acute intracranial hemorrhage.      Impression:  1. There are new areas of acute infarction involving the anterior left temporal lobe and the parasagittal anterior right frontal lobe. 2. Evolving changes of left frontal craniotomy and left frontal tumor resection. There is gross total resection of the tumor without definite residual enhancement. 3. Significantly improved mass effect and midline shift from tumor resection and diminished adjacent vasogenic edema. 4. There is a new small subdural collection along the undersurface of the craniotomy flap, not unexpected following surgical intervention.  This report was finalized on 9/22/2022 7:08 PM by Kai Sanders MD.      MRI Brain With & Without Contrast    Result Date: 9/17/2022  Narrative: EXAMINATION: MRI BRAIN W WO CONTRAST DATE: 9/17/2022 8:02 PM  INDICATION: Newly diagnosed brain tumor, confusion, disorientation, unsteady gait, difficulty with words  COMPARISON: None  TECHNIQUE: Multiplanar, multisequence MRI of the brain was performed with and without contrast. 20 mL of MultiHance was injected intravenously.  FINDINGS:  Intracranial Contents: Large centrally necrotic, rim-enhancing, intra-axial mass in the left frontal lobe extending from the ventricle to the cortex measuring approximately 5.3 cm in short axis, 6.6 cm in long axis, and 4.9 cm craniocaudad. Restricted diffusion within the enhancing hypercellular areas (but not a necrotic center). There appears to be enhancement of the ependymal surface of the face the left frontal horn. The mass involves the  anterior corpus callosum. Extensive peritumoral edema throughout the left frontal white matter and cortex and also involving the insula, lentiform nuclei and internal capsule. 18 mm left right midline shift an subfalcine herniation. Basal cisterns are patent, no uncal herniation. Complete effacement of the left lateral ventricle. No significant dilation of the right lateral ventricle. Diffuse sulcal effacement throughout the left hemisphere. No infarct. No intracranial hemorrhage. No other mass. Major intracranial flow voids are present, including the anterior cerebral arteries which are markedly displaced to the right. Bones and extracranial soft tissues: Marrow signal is normal. No focal osseous lesions. Orbits are unremarkable. Mastoid air cells and middle ear cavities are clear. Paranasal sinuses are clear.     Impression: 1. Large, centrally necrotic, rim-enhancing, intra-axial mass in the left frontal lobe extending from the ventricle to the cortex, measuring 5.3 x 6.6 x 4.9 cm, imaging features most consistent with aggressive primary CNS neoplasm such as glioblastoma. Tissue necessary for definitive diagnosis. 2. Extensive nonspecific peritumoral edema throughout the left frontal lobe and also involving the basal ganglia, internal capsule and insula. This may represent a combination of vasogenic edema and infiltrating tumor. 3. Left to right midline shift and subfalcine herniation measuring 18 mm. Complete effacement of the left lateral ventricle and left hemispheric sulci. No significant dilation of the right lateral ventricle at this time, attention on follow-up advised. Basal cisterns remain patent. This examination was interpreted by Parker Love M.D. Electronically signed by:  Parker Love M.D.  9/17/2022 7:20 PM Mountain Time    XR Chest 1 View    Result Date: 9/21/2022  Narrative: DATE OF EXAM: 9/21/2022 12:32 PM  PROCEDURE: XR CHEST 1 VW-  INDICATIONS: decreased spo2; G93.89-Other specified  disorders of brain; D33.0-Benign neoplasm of brain, supratentorial; R47.9-Unspecified speech disturbances; R26.9-Unspecified abnormalities of gait and mobility  COMPARISON: 9/17/2022  TECHNIQUE: Single radiographic AP view of the chest was obtained.  FINDINGS: There is trace linear atelectasis the left lung base. No edema, effusion or pneumothorax is seen. Mildly increased prominence of the cardiomediastinal silhouette is likely due to low lung volumes on today's study. Heart does not appear to be enlarged. Vasculature is normal.      Impression: Minimal left basilar atelectasis. No other evidence of active chest pathology.  This report was finalized on 9/21/2022 1:07 PM by Dr. Roshan Riojas MD.      XR Chest 1 View    Result Date: 9/17/2022  Narrative: DATE OF EXAM: 9/17/2022 6:40 PM  PROCEDURE: XR CHEST 1 VW-  INDICATIONS: Acute Stroke Protocol (onset < 12 hrs)  COMPARISON: No comparisons available.  TECHNIQUE: Single radiographic AP view of the chest was obtained.  FINDINGS: Heart is upper normal size. Vasculature appears normal. There are only mild chronic appearing interstitial lung changes. No edema effusion or pneumothorax is seen.      Impression: No evidence of active chest disease.  This report was finalized on 9/17/2022 7:35 PM by Dr. Roshan Riojas MD.      CT Head Without Contrast Stroke Protocol    Result Date: 9/17/2022  Narrative: DATE OF EXAM: 9/17/2022 5:57 PM  PROCEDURE: CT HEAD WO CONTRAST STROKE PROTOCOL-  INDICATIONS: Neuro deficit, acute, stroke suspected  COMPARISON: No comparisons available.  TECHNIQUE: Routine transaxial and coronal reconstruction images were obtained through the head without the administration of contrast. Automated exposure control and iterative reconstruction methods were used.  The radiation dose reduction device was turned on for each scan per the ALARA (As Low as Reasonably Achievable) protocol.  FINDINGS: There is a large necrotic appearing left frontal lobe mass, the main  body of which is rounded, extending over an at least 6 cm area. Adjacent low-attenuation changes may represent a combination of mass and vasogenic edema. Extensive vasogenic edema extends superiorly, to the left frontal cortex. There is complete effacement of the left frontal horn and body of the lateral ventricle, and reduced size of the left occipital horn. Right lateral ventricle remains normal in size. There is focal right to left midline shift at the level of the frontal horns, estimated at approximately 2 cm, and much less midline shift anterior and posterior to the level of the mass. There is no visible hemorrhage, no evidence of other mass or edema, no evidence of abnormal extra-axial collection.      Impression: Large, approximately 6 cm left frontal lobe mass and associated vasogenic edema. Marked focal mass effect on the left lateral ventricle. Appearance suggests primary neoplasm such as glioblastoma.    Exam time shown is 5:59 PM. Study was reviewed on the CT scan monitor and discussed with Dr. Flores at 6:03 PM.  This report was finalized on 9/17/2022 9:57 PM by Dr. Roshan Riojas MD.        ASSESSMENT: The patient is a very pleasant 51 y.o. male  with left frontal lobe GBM      PLAN:    1.  Left frontal lobe GBM:  A.  The patient is healing well from his surgery.  B.  He will be started on concurrent Temodar with radiation.  C.  We will do Temodar 75 mg square daily including weekends starting with day 1 of radiation.  D.  I will monitor his blood work including blood counts kidney function liver function and electrolytes.  E.  He will follow-up with me on treatment day 15.  F.  I discussed the case with Dr. Field from additional oncology who kindly agreed see the patient today.  G.  We will plan start treatment in 2 weeks.    2.  Brain edema:  A.  He will currently be weaned off steroids gradually.  He still has 3 more days to complete.  B.  The patient has been started on Keppra twice a day by  neurosurgery.  He never had any seizure episodes.  We might be able to wean him off this gradually down the road.    3.  Hypertension:  April continue simple and Norvasc.    FOLLOW UP: 3 weeks with CBC and CMP.    Shaan Rader MD  10/10/2022

## 2022-10-10 NOTE — PROGRESS NOTES
CONSULTATION NOTE    NAME:      Nick Hancock  :                                                          1970  DATE OF CONSULTATION:                       10/10/22  REQUESTING PHYSICIAN:                   Shaan Rader MD  REASON FOR CONSULTATION:           Further evaluation management of the patient's GBM of the left frontal lobe for consideration of adjuvant radiotherapy treatments  Who grade 4 GBM left frontal lobe         BRIEF HISTORY:  Nick Hancock  is a very pleasant 51 y.o. male  who initially presented with concern for stroke and neurocognitive changes in 2022.  The patient has CT scan of the head on 2022 that showed a 6 cm left frontal lobe mass with edema.  Patient had a staging CT scan of chest abdomen pelvis that was negative for evidence of disease.  The patient then had an MRI scan of the brain on 2022 that showed a 5.3 x 6.6 cm lesion in the left frontal lobe extending to the ventricle and cortex.  The patient had evidence of midline shift.  The patient was then taken to surgery by Dr. Patton who was found to have a IDH 1/2 negative GBM who grade 4.  The patient reportedly had a complete resection.  Patient then had an MRI scan of the brain on 2022 that showed postoperative changes of a left frontal craniotomy and a gross total resection.  The patient has been on Dex and Keppra.  He has been trying to wean off the dexamethasone is down to 1 pill/day.  Patient denies having the seizures previously.  The patient saw Dr. Patton and Dr. Rader earlier today.  He is referred to our clinic for consideration of radiotherapy.    Patient reports that he is frequently tired.  Reports he has difficulty sleeping.  He ports his blood sugars are difficult to control.        BMI:  Body mass index is 28.92 kg/m².      Social History     Substance and Sexual Activity   Alcohol Use Not Currently    Comment: Socially       No Known Allergies    Social History     Tobacco Use    • Smoking status: Former     Packs/day: 0.00     Years: 10.00     Pack years: 0.00     Types: Cigarettes   • Smokeless tobacco: Current     Types: Snuff   Vaping Use   • Vaping Use: Never used   Substance Use Topics   • Alcohol use: Not Currently     Comment: Socially   • Drug use: Never         Past Medical History:   Diagnosis Date   • Diverticulitis    • High triglycerides    • Hyperlipidemia    • Hypertension    • Skin cancer    • Sleep apnea        family history includes Cancer in his mother; Heart attack in his mother; Hyperlipidemia in his father; Hypertension in his brother; Melanoma in his mother; Stroke in his father.     Past Surgical History:   Procedure Laterality Date   • COLONOSCOPY     • CRANIOTOMY  08/21/2022   • CRANIOTOMY FOR TUMOR N/A 09/21/2022    Procedure: CRANIOTOMY FOR TUMOR;  Surgeon: Umair Patton MD;  Location: Replaced by [facility];  Service: Neurosurgery;  Laterality: N/A;   • EYE SURGERY Bilateral 2008    PRK   • SKIN SURGERY  2010    removed abnormal cells        Review of Systems   Constitutional: Negative for appetite change, fatigue and unexpected weight change.   HENT:   Negative for sore throat and trouble swallowing.    Respiratory: Negative for cough and shortness of breath.    Gastrointestinal: Negative for vomiting.   Neurological: Negative for dizziness, headaches and light-headedness.    A full 14 point review of systems was performed and was negative except as noted in the HPI.         Objective   VITAL SIGNS:   Vitals:    10/10/22 1531   BP: 119/75   Pulse: 66   Resp: 16   Temp: 97.9 °F (36.6 °C)   SpO2: 96%   Weight: 96.8 kg (213 lb 4.8 oz)   PainSc: 0-No pain        KPS       80%    Physical Exam  Constitutional:       General: He is not in acute distress.     Appearance: He is well-developed.   HENT:      Head: Normocephalic and atraumatic.   Eyes:      Conjunctiva/sclera: Conjunctivae normal.      Pupils: Pupils are equal, round, and reactive to light.   Neck:       Trachea: No tracheal deviation.   Pulmonary:      Effort: Pulmonary effort is normal. No respiratory distress.      Breath sounds: No wheezing.   Abdominal:      General: There is no distension.      Palpations: Abdomen is soft.   Musculoskeletal:         General: Normal range of motion.      Cervical back: Normal range of motion.   Skin:     General: Skin is warm and dry.      Comments: Surgical incision on left scalp well-healed.  Sutures removed.   Neurological:      Mental Status: He is alert.      Cranial Nerves: No cranial nerve deficit.      Coordination: Coordination normal.   Psychiatric:         Behavior: Behavior normal.         Judgment: Judgment normal.              The following portions of the patient's history were reviewed and updated as appropriate: allergies, current medications, past family history, past medical history, past social history, past surgical history and problem list.  I reviewed the patient's imaging to date.  I reviewed Dr. Patton's most recent note.  I reviewed Dr. Rader's most recent note.  CT Abdomen Pelvis Without Contrast    Result Date: 9/17/2022  Somewhat limited exam due to lack of IV contrast. No specific site of malignancy seen. Distended gallbladder without additional gallbladder abnormality by CT. Hepatic steatosis. Colonic diverticulosis without visible acute diverticulitis. Small amount of fluid in the scrotum bilaterally, likely small hydroceles. Atherosclerosis including coronary calcifications. Small bilateral fat-containing inguinal hernias. Small fat-containing umbilical hernia. Electronically signed by:  Daniel Castillo M.D.  9/17/2022 6:42 PM Mountain Time    CT Head Without Contrast    Result Date: 9/22/2022  Continued expected evolution of postoperative changes from recent left frontal craniotomy for resection of large centrally necrotic enhancing mass. There is improved pneumocephalus, including resolution of previously noted air within the venous  sinuses. There is no new hemorrhage or increased mass effect. Increased conspicuity of some hypoattenuation along the left temporal pole, concerning for evolving ischemia/small area of devitalized tissue.  This report was finalized on 9/22/2022 7:41 AM by Jeet Lemus.      CT Head Without Contrast    Result Date: 9/21/2022  Expected postoperative appearance following left frontal craniotomy for resection of previously noted large enhancing centrally necrotic left frontal lobe mass. Additionally, there is air noted within the dural venous sinuses, most notably within the superior sagittal sinus, a finding of uncertain clinical significance, with attention recommended on anticipated follow-up. Mass effect is overall improved and there is otherwise no unexpected hemorrhage, ischemia or new mass effect.  This report was finalized on 9/21/2022 5:52 PM by Jeet Lemus.      CT Chest Without Contrast Diagnostic    Result Date: 9/17/2022  Somewhat limited exam due to lack of IV contrast. No specific site of malignancy seen. Distended gallbladder without additional gallbladder abnormality by CT. Hepatic steatosis. Colonic diverticulosis without visible acute diverticulitis. Small amount of fluid in the scrotum bilaterally, likely small hydroceles. Atherosclerosis including coronary calcifications. Small bilateral fat-containing inguinal hernias. Small fat-containing umbilical hernia. Electronically signed by:  Daniel Castillo M.D.  9/17/2022 6:42 PM Mountain Time    MRI Brain With & Without Contrast    Result Date: 9/22/2022   1. There are new areas of acute infarction involving the anterior left temporal lobe and the parasagittal anterior right frontal lobe. 2. Evolving changes of left frontal craniotomy and left frontal tumor resection. There is gross total resection of the tumor without definite residual enhancement. 3. Significantly improved mass effect and midline shift from tumor resection and diminished  adjacent vasogenic edema. 4. There is a new small subdural collection along the undersurface of the craniotomy flap, not unexpected following surgical intervention.  This report was finalized on 9/22/2022 7:08 PM by Kai Sanders MD.      MRI Brain With & Without Contrast    Result Date: 9/17/2022  1. Large, centrally necrotic, rim-enhancing, intra-axial mass in the left frontal lobe extending from the ventricle to the cortex, measuring 5.3 x 6.6 x 4.9 cm, imaging features most consistent with aggressive primary CNS neoplasm such as glioblastoma. Tissue necessary for definitive diagnosis. 2. Extensive nonspecific peritumoral edema throughout the left frontal lobe and also involving the basal ganglia, internal capsule and insula. This may represent a combination of vasogenic edema and infiltrating tumor. 3. Left to right midline shift and subfalcine herniation measuring 18 mm. Complete effacement of the left lateral ventricle and left hemispheric sulci. No significant dilation of the right lateral ventricle at this time, attention on follow-up advised. Basal cisterns remain patent. This examination was interpreted by Parker Love M.D. Electronically signed by:  Parker Love M.D.  9/17/2022 7:20 PM Mountain Time      CT Head Without Contrast Stroke Protocol    Result Date: 9/17/2022  Large, approximately 6 cm left frontal lobe mass and associated vasogenic edema. Marked focal mass effect on the left lateral ventricle. Appearance suggests primary neoplasm such as glioblastoma.    Exam time shown is 5:59 PM. Study was reviewed on the CT scan monitor and discussed with Dr. Flores at 6:03 PM.  This report was finalized on 9/17/2022 9:57 PM by Dr. Roshan Riojas MD.        Assessment      IMPRESSION:     The patient is a very pleasant 51-year-old gentleman with a who grade 4 GBM of the left frontal lobe status post gross total resection.  The patient has healed well.  His sutures have been removed and he is ready  to begin planning for radiotherapy.  Recommend proceeding with radiation planning tomorrow to begin in about 2 weeks time.  The patient has seen Dr. Rader who has plans for Temodar concurrently with radiation.  We discussed the risk and benefits of proceeding with radiotherapy today.  The patient is interested in proceeding.  I recommend a dose of 60 Gray in 30 fractions with IGR T and IMRT.  The patient's postoperative MRI scan will be utilized in his planning.    Recommend patient return tomorrow for CT simulation.    Greater than 1 hour was spent preparing for and coordinating this visit. >50% of the time was spent in direct face to face conversation with the patient teaching, answering question, and providing explanations regarding the patient's case.  The decision to treat the patient with radiation is a complex one and carries the risk of long-term side effects and complications.  The patient's malignancy represents a complicated life threatening condition that requires complex multidisciplinary management for treatment and followup.      RECOMMENDATIONS:    Who grade 4 GBM left frontal lobe  -Status post gross total resection with Dr. Patton  -Plans for adjuvant Temodar with Dr. Rader concurrently with radiotherapy  -Recommend CT simulation tomorrow  -Recommend starting treatment in 2 weeks  -Recommend 60 Hess in 30 fractions IGR T IMRT             Shiv Field MD        Errors in dictation may reflect use of voice recognition software and not all errors in transcription may have been detected prior to signing.

## 2022-10-11 ENCOUNTER — SPECIALTY PHARMACY (OUTPATIENT)
Dept: ONCOLOGY | Facility: HOSPITAL | Age: 52
End: 2022-10-11

## 2022-10-11 ENCOUNTER — HOSPITAL ENCOUNTER (OUTPATIENT)
Dept: RADIATION ONCOLOGY | Facility: HOSPITAL | Age: 52
Discharge: HOME | End: 2022-10-11

## 2022-10-11 ENCOUNTER — DOCUMENTATION (OUTPATIENT)
Dept: ONCOLOGY | Facility: HOSPITAL | Age: 52
End: 2022-10-11

## 2022-10-11 DIAGNOSIS — C71.9 GLIOBLASTOMA: Primary | ICD-10-CM

## 2022-10-11 PROCEDURE — 77334 RADIATION TREATMENT AID(S): CPT | Performed by: RADIOLOGY

## 2022-10-11 PROCEDURE — 77399 UNLISTED PX MED RADJ PHYSICS: CPT | Performed by: RADIOLOGY

## 2022-10-11 PROCEDURE — 77470 SPECIAL RADIATION TREATMENT: CPT | Performed by: RADIOLOGY

## 2022-10-11 PROCEDURE — 77290 THER RAD SIMULAJ FIELD CPLX: CPT | Performed by: RADIOLOGY

## 2022-10-11 RX ORDER — TEMOZOLOMIDE 140 MG/1
140 CAPSULE ORAL DAILY
Qty: 30 CAPSULE | Refills: 1 | Status: SHIPPED | OUTPATIENT
Start: 2022-10-17 | End: 2022-10-17

## 2022-10-11 NOTE — PROGRESS NOTES
Specialty Pharmacy Patient Management Program  Oncology Outreach      Nick is a 51 y.o. male contacted today regarding refills of his medication(s).    Specialty medication(s) and dose(s) confirmed: Temozolomide 140mg  Other medications being refilled: N/A    Temozolomide 140mg: $14 copay. Insurance covers max 28 day supply. Patient will  after education on 10/12               Dany Bell, CPhT  Care Coordinator, Specialty Pharmacy  10/11/2022  08:53 EDT

## 2022-10-11 NOTE — PROGRESS NOTES
Oral Chemotherapy - New Referral    Received a referral from Dr. Rader    Treatment Plan: Temodar (temozolomide)  Start date of treatment planned for: Day 1 with concurrent radiation.  Indication: Glioblastoma  Relevant past treatments: Surgery  Is the therapy appropriate based on treatment guidelines and FDA labeling?: Yes  Therapeutic Goals: Continue treatment until progression or intolerable toxicity and Continue treatment during concurrent radiation  Patient can self-administer oral medications: Yes    • Drug-Drug Interactions: The current medication list was reviewed and there are no relevant drug-drug interactions.  • Medication Allergies: The patient has NKDA  • Review of Labs/Dose Adjustments: The patient's most recent labs were reviewed and all are WNL to start treatment at this dose.    • Dose = 75 mg/m2/dose x 2.19 m2 = 164 mg, rounded to 180 mg based on available capsule size.    A prescription was released to [facility] specialty pharmacy for   Drug: Temodar (temozolomide)  Strength: 180 mg  Directions: Take 1 capsule by mouth at bedtime on days 1-42 during radiation  Quantity: 42  Refills: 0    Pharmacy education is scheduled for 10/12/22 at 12 pm. and CCA and consent will be signed at that time.    Adina Garcia, PharmD, [facility]  Oncology Clinical Pharmacist  10/11/2022  08:00 EDT

## 2022-10-12 ENCOUNTER — SPECIALTY PHARMACY (OUTPATIENT)
Dept: ONCOLOGY | Facility: HOSPITAL | Age: 52
End: 2022-10-12

## 2022-10-12 ENCOUNTER — HOSPITAL ENCOUNTER (OUTPATIENT)
Dept: ONCOLOGY | Facility: HOSPITAL | Age: 52
Discharge: HOME | End: 2022-10-12
Admitting: INTERNAL MEDICINE

## 2022-10-12 ENCOUNTER — OFFICE VISIT (OUTPATIENT)
Dept: ONCOLOGY | Facility: CLINIC | Age: 52
End: 2022-10-12

## 2022-10-12 VITALS
WEIGHT: 211 LBS | RESPIRATION RATE: 18 BRPM | SYSTOLIC BLOOD PRESSURE: 123 MMHG | BODY MASS INDEX: 28.58 KG/M2 | HEART RATE: 78 BPM | HEIGHT: 72 IN | TEMPERATURE: 97.3 F | DIASTOLIC BLOOD PRESSURE: 67 MMHG | OXYGEN SATURATION: 95 %

## 2022-10-12 DIAGNOSIS — C71.9 GLIOBLASTOMA: Primary | ICD-10-CM

## 2022-10-12 PROCEDURE — 99214 OFFICE O/P EST MOD 30 MIN: CPT | Performed by: NURSE PRACTITIONER

## 2022-10-12 PROCEDURE — G0463 HOSPITAL OUTPT CLINIC VISIT: HCPCS

## 2022-10-12 NOTE — PROGRESS NOTES
"CHEMOTHERAPY PREPARATION    Nick Hancock  [id number]  1970    Subjective   Chief Complaint: Treatment Preparation and Needs Assessment    History of present illness:  Nick Hancock is a 51 y.o. year old male who is here today for chemotherapy preparation and needs assessment. The patient has been diagnosed with GBM and is scheduled to begin oral and IV treatment with Temodar with radiation.     Oncology History:    Oncology/Hematology History   Glioblastoma (HCC)   9/17/2022 Initial Diagnosis    Glioblastoma (HCC)     10/18/2022 -  Chemotherapy    OP CNS Temozolomide + XRT         The current medication list and allergy list were reviewed and reconciled.     Past Medical History, Past Surgical History, Social History, Family History have been reviewed and are without significant changes except as mentioned.      Review of Systems   Constitutional: Positive for fatigue.   Neurological: Positive for headaches.   Psychiatric/Behavioral: Positive for sleep disturbance.       Objective   Physical Exam  Vital Signs: /67   Pulse 78   Temp 97.3 °F (36.3 °C) (Temporal)   Resp 18   Ht 182.9 cm (72.01\")   Wt 95.7 kg (211 lb)   SpO2 95%   BMI 28.61 kg/m²    General Appearance:  alert, cooperative, no apparent distress and appears stated age   Neurologic/Psychiatric: A&O x 3, gait steady, appropriate affect   HEENT:  Normocephalic, without obvious abnormality, mucous membranes moist   Lungs:   Clear to auscultation bilaterally; respirations regular, even, and unlabored bilaterally   Heart:  Regular rate and rhythm, no murmurs appreciated   Extremities: Normal, atraumatic; no clubbing, cyanosis, or edema    Skin: No rashes, lesions, or abnormal coloration noted     ECOG Performance Status: 1 - Symptomatic but completely ambulatory            NEEDS ASSESSMENTS    Genetics  The patient's new diagnosis and family history have been reviewed for genetic counseling needs. A genetic referral is not recommended. " "    Psychosocial  The patient has completed a PHQ-9 Depression Screening and the Distress Thermometer (DT) today.   PHQ-9 results show 1-4 (Minimal Depression). The patient scored their distress today as 0 on a scale of 0-10 with 0 being no distress and 10 being extreme distress.   Problems marked as being an issue for him within the last week include no problems.   Results were reviewed along with psychosocial resources offered by our cancer center. Our oncology social worker will be flagged for a DT score of 4 or above, and a same day call will be made for a score of 9 or 10. A mental health referral is not recommended at this time. The patient is not accepting of a referral to [facility].   Copies of patient's questionnaires will be scanned into EMR for details and further reference.    Barriers to care  A barriers form was also completed by the patient today. We discussed services offered by our facility to help him have adequate access to care. The patient was given the name and card for our Oncology Social Worker, Ly Asencio. Based upon barriers assessment today, the patient will not require a follow-up call from the  to further discuss needs.   A copy of the barriers form will also be scanned into EMR for details and further reference.     VAD Assessment  The patient and I discussed planned intervenous chemotherapy as well as other IV treatments that are often needed throughout the course of treatment. These may include, but are not limited to blood transfusions, antibiotics, and IV hydration. The patient's vasculature does appear to be adequate for multiple peripheral IVs throughout their treatment course. Discussed risks and benefits of VADs. The patient would not like to pursue Port-A-Cath insertion prior to initiation of treatment.       Advance Care Planning   The patient and I discussed advanced care planning, \"Conversations that Matter\".   This service was offered, free of " charge, for development of advance directives with a certified ACP facilitator.  The patient does have an up-to-date advanced directive. This document is not on file with our office. The patient is not interested in an appointment with one of our facilitators to create or update their advanced directives.         Palliative Care  The patient and I discussed palliative care services. Palliative care is not the same as Hospice care. This is specialized medical care for people living with serious illness with the goal of improving quality of life for the patient and their family. [facility] has partnered with [facility] to offer our patients outpatient palliative care early along with their treatment to assist in coordination of care, symptom management, pain management, and medical decision making.  Oncology criteria for palliative care referral is not met at this time. The patient is not interested in a palliative care consultation.     Additional Referral needs  none      IV CHEMOTHERAPY EDUCATION    Booklets Given: Chemotherapy and You [x]  Eating Hints [x]    Sexuality/Fertility Books []      Chemotherapy/Biotherapy Education Sheets: (list all that apply)  nausea management, acid reflux management, diarrhea management, Cancer resourse contacts information, skin and mouth care, vaccination information and Treatment Calendar                                                                                                                                                                 Chemotherapy Regimen:   Treatment Plans     Name Type Plan Dates Plan Provider         Active    OP CNS Temozolomide + XRT ONCOLOGY TREATMENT  10/17/2022 - Present Shaan Rader MD                                                                                                       ORAL CHEMOTHERAPY TEACHING    Oral Chemotherapy Regimen: Temodar 75 mg/m2 daily beginning on the first day of radiation.     Date of Medication  Start: 10/24/2022      DETAILED CHEMOTHERAPY TEACHING COMPLETED BY PHARMACY. CHEMOTHERAPY CONSENT COMPLETED BY PHARMACY. SEE PHARMACY EDUCATION FOR DOCUMENTATION.     Chemotherapy education comprehension reviewed. Questions answered and additional information discussed on topics including:  Anemia, Thrombocytopenia, Nutrition and appetite changes, Constipation, Diarrhea, Nausea & vomiting and Organ toxicities        Assessment and Plan:    Diagnoses and all orders for this visit:    1. Glioblastoma (HCC) (Primary)        This was a 45 minute face-to-face visit with 45 minutes spent in  counseling and coordination of care as documented above.   The patient and I have reviewed their new cancer diagnosis and scheduled treatment plan. Needs assessment was completed including genetics, psychosocial needs, barriers to care, VAD evaluation, advanced care planning, and palliative care services. Referrals have been ordered as appropriate based upon our evaluation and patient desires.     IV and oral chemotherapy teaching was also completed today as documented above. Adequate time was given to answer all questions to his satisfaction. Patient and family are aware of their care team members and contact information if they have questions or problems throughout the treatment course. Needs assessments and education has been completed. The patient is adequately prepared to begin treatment as scheduled.     Reviewed use of Zofran as pre-med with Temodar and as needed for breakthrough nausea.     He is weaning off steroids in the next couple days. We discussed his sleep will hopefully improve after he is off steroids, however if it doesn't we can consider adding sleep aid if needed.     The patient had up to date copy of his advanced directive and power of  that were scanned into his chart at his visit today.     Plan to repeat imaging studies after completing radiation course, then if he has good response plan to continue  maintenance Temodar.     Electronically signed by [facility] on 10/12/22 at 15:03 EDT.

## 2022-10-12 NOTE — PROGRESS NOTES
Specialty Pharmacy Patient Management Program  Oncology Initial Assessment       Nick Hancock is a 51 y.o. male with glioblastoma seen by an Oncology provider and enrolled in the Oncology Patient Management program offered by [facility].  An initial outreach was conducted, including assessment of therapy appropriateness and specialty medication education for Temodar (temozolomide). The patient was introduced to services offered by [facility], including: regular assessments, refill coordination, curbside pick-up or mail order delivery options, prior authorization maintenance, and financial assistance programs as applicable. The patient was also provided with contact information for the pharmacy team.     Regimen: Temodar (temozolomide) 180 mg PO QHS on days 1-42 with radiation    Start date of oral specialty medication: Day 1 with concurrent radiation. Expected to be 10/24/22.    Relevant Past Medical History, Comorbidities, and Vaccines  Relevant medical history and concomitant health conditions were discussed with the patient. The patient's chart has been reviewed for relevant past medical history and comorbid health conditions and updated as necessary.  Vaccines are coordinated by the patient's oncologist and primary care provider.  Past Medical History:   Diagnosis Date   • Diverticulitis    • High triglycerides    • Hyperlipidemia    • Hypertension    • Skin cancer    • Sleep apnea      Social History     Socioeconomic History   • Marital status:    Tobacco Use   • Smoking status: Former     Packs/day: 0.00     Years: 10.00     Pack years: 0.00     Types: Cigarettes   • Smokeless tobacco: Current     Types: Snuff   Vaping Use   • Vaping Use: Never used   Substance and Sexual Activity   • Alcohol use: Not Currently     Comment: Socially   • Drug use: Never   • Sexual activity: Yes     Partners: Male     Birth control/protection: Same-sex partner        Allergies  Known allergies and reactions were discussed with the patient. The patient's chart has been reviewed for allergy information and updated as necessary.   Patient has no known allergies.    Current Medication List  This medication list has been reviewed with the patient and evaluated for any interactions or necessary modifications/recommendations, and updated to include all prescription medications, OTC medications, and supplements the patient is currently taking.  This list reflects what is contained in the patient's profile, which has also been marked as reviewed to communicate to other providers it is the most up to date version of the patient's current medication therapy.   Prior to Admission medications    Medication Sig Start Date End Date Taking? Authorizing Provider   amLODIPine (NORVASC) 10 MG tablet Take 1 tablet by mouth Daily. 9/27/22   Sean Mak MD   dexamethasone (DECADRON) 2 MG tablet Dexamethasone 4 mg BID x 5 days, then 2 mg BID x 5 days, then 2 mg once a day, then stop. 9/26/22   Sean Mak MD   levETIRAcetam (KEPPRA) 500 MG tablet Take 1 tablet by mouth 2 (Two) Times a Day. 9/26/22   Sean Mak MD   lisinopril (PRINIVIL,ZESTRIL) 20 MG tablet Take 20 mg by mouth Daily. 1/12/22   Ousmane Guerin MD   multivitamin with minerals tablet tablet Take 1 tablet by mouth Daily. 9/26/22   Kalyn Kahn APRN   niacin 500 MG tablet Take 500 mg by mouth Every Night.    Ousmane Guerin MD   VASCEPA 1 g capsule capsule  1/19/20   Ousmane Guerin MD   multivitamin with minerals tablet tablet Take 1 tablet by mouth Daily.  9/26/22  Ousmane Guerin MD       Drug Interactions  • Reviewed concomitant medications, allergies, labs, comorbidities/medical history, quality of life, and immunization history.   • Drug-drug interactions noted and discussed during education: no significant drug interactions noted. . Reminded the patient to let us know before making any  changes or starting any new prescription or OTC medications so we can first assess drug interactions.  • Drug-food interactions: None    Recommended Medications Assessment  • Bone Health (such as calcium/vitamin D, bisphosphonate, RANKL inhibitor) - Not Indicated   • VTE prophylaxis - Not Indicated   • Prophylactic antimicrobials - Indicated, not currently taking The patient will start taking PJP Prophylaxis: Bactrim DS - Take 1 tablet PO three times a week on Monday, Wednesday, and Friday.  He has picked this up from [facility] in [place].    Relevant Laboratory Values  Lab Results   Component Value Date    GLUCOSE 176 (H) 09/24/2022    CALCIUM 9.1 09/24/2022     09/24/2022    K 4.1 09/24/2022    CO2 24.0 09/24/2022     09/24/2022    BUN 22 (H) 09/24/2022    CREATININE 0.62 (L) 09/24/2022    EGFRIFNONA 79 09/20/2021    BCR 35.5 (H) 09/24/2022    ANIONGAP 14.0 09/24/2022     Lab Results   Component Value Date    WBC 18.24 (H) 09/24/2022    RBC 4.73 09/24/2022    HGB 14.6 09/24/2022    HCT 42.0 09/24/2022    MCV 88.8 09/24/2022    MCH 30.9 09/24/2022    MCHC 34.8 09/24/2022    RDW 12.3 09/24/2022    RDWSD 40.1 09/24/2022    MPV 9.7 09/24/2022     09/24/2022    NEUTRORELPCT 86.5 (H) 09/24/2022    LYMPHORELPCT 5.6 (L) 09/24/2022    MONORELPCT 5.2 09/24/2022    EOSRELPCT 0.1 (L) 09/24/2022    BASORELPCT 0.5 09/24/2022    AUTOIGPER 2.1 (H) 09/24/2022    NEUTROABS 15.79 (H) 09/24/2022    LYMPHSABS 1.02 09/24/2022    MONOSABS 0.94 (H) 09/24/2022    EOSABS 0.01 09/24/2022    BASOSABS 0.09 09/24/2022    AUTOIGNUM 0.39 (H) 09/24/2022    NRBC 0.0 09/24/2022       Initial Education Provided for Specialty Medication  The patient has been provided with the following education. All questions and concerns have been addressed prior to the patient receiving the medication, and the patient has verbalized understanding of the education and any materials provided.  Additional patient education shall be  provided and documented upon request by the patient, provider or payer.      Provided patient with:   Chemo calendar to help improve adherence., Education sheets about the medication, 24-hour clinic phone number and my contact information and instructions to call should additional questions arise.     Medication Education Sheets Provided: (select all that apply)  • Oral Specialty Medication: Temodar (temozolomide)    Other Education Sheets Provided: (select all that apply)  Adherence, CINV, Constipation and Symptom Tracker Sheet and DIPIKA Information    TOPICS COMMENTS   Storage and Handling of Oral Specialty Medication Store in the original container, in a dry location out of direct sunlight, and out of reach of children or pets. and Store at room temperature.  Discussed safe handling and what to do with any unused medication.   Administration of Oral Specialty Medication Take on an empty stomach, 1 hour(s) before and 2 hour(s) after eating., Take with a full glass of water. and Do not open or dissolve capsules, unless otherwise instructed by the pharmacist.     Take at bedtime to decrease the risk of being nauseous.   Adherence to Oral Specialty Regimen and Handling Missed Doses Patient is likely to have good treatment adherence; reinforced the importance of adherence. Reviewed how to address missed doses and to let us know of any missed doses.   Fatigue Explained that he could feel more tired than usual and to try to stay active, but rest if they need to.    Thrombocytopenia: role of platelet, cause, s/s, ways to prevent bleeding, things to avoid, when to seek help Reviewed the role of platelets in blood clotting and when to call clinic (bloody nose that bleeds for 5 mins despite pressure, a cut that won't stop bleeding despite pressure, gums that bleed excessively with brushing or flossing). Recommended using an electric razor, soft bristle toothbrush, and blowing your nose gently.    Neutropenia: role of WBC,  cause, infection precautions, s/s of infection, when to call MD Reviewed the role of WBC, good infection prevention practices, and when to call the clinic (temperature 100.4F, sore throat, burning urination, etc)  • COVID Vaccines: 1 dose received  of the J&J vaccine.  He's also been infected with COVID-19 after the original vaccination.  He's not interested in receiving any other doses.  • Flu Vaccine: Declined flu vaccine   Nutrition and Appetite Changes:  importance of maintaining healthy diet & weight, ways to manage to improve intake, dietary consult, exercise regimen, electrolyte and/or blood glucose abnormalities Discussed risk of decreased appetite. Recommended eating smaller, more frequent meals. Instructed the patient to contact clinic if they were losing weight or having difficulty eating enough to maintain their energy level.   Diarrhea: causes, s/s of dehydration, ways to manage, dietary changes, when to call MD While temozolomide isn't known to cause diarrhea, the patient explained he's currently experiencing this.  I gave him a handout with instructions for loperamide and non-drug measures to manage diarrhea.  I recommended he start taking that since he's not going to start temozolomide for a few weeks (it can cause constipation).  I reinforced hydrating well while he's having diarrhea and to call clinic if loperamide isn't taking care of it.  When he starts temozolomide, I explained that he could start to become constipated and if that happened, that he would need to stop taking anti-diarrhea medications and would then need to reference the information I gave him on constipation.  He confirmed his understanding of this.   Constipation: causes, ways to manage, dietary changes, when to call MD Provided supplementary handout with instructions for use of various OTC therapies and non-drug measures to manage constipation. Recommended starting with Miralax 17 grams PO daily for several days and to call  clinic if medications aren't working.     Nausea/Vomiting: cause, use of antiemetics, dietary changes, when to call MD • Emetic risk: Low - at the dose used with radiation.    Explained that if he proceeds to maintenance treatment in the future, the dose is typically 2x the dose used during radiation and the risk for nausea would then be moderate.  I will talk more about this with him in the future when the time is appropriate.  • Premeds: None  • PRN home meds: Ondansetron 8 mg PO TID PRN N/V  • Pharmacy home meds sent to: [facility] in [place].  They picked this up already.    Instructed the patient to take a dose of the PRN medication at the first onset of nausea and if it's not working to call us for additional medications.  Also provided non-drug measures to mitigate nausea.   Alopecia: cause, ways to manage, resources Discussed the possibility of hair loss with the patient. He recently cut his hair very short and wasn't too concerned about this.  He does have a her and hasn't been shaving recently.  I explained that may thin or fall out, depending on how long he's on this medication.  I explained that shaving was okay, but that he should use an electric razor (due to the possibility of bleeding with low platelets, per above). Recommended covering the head with a hat during the winter and protecting the skin on the head with SPF 30 or higher.    Pain: causes, ways to manage Headaches can occur.  He's not having these now and will use OTC therapies to manage any pain.  But he will call clinic if he starts having severe headaches.   Organ Toxicities: cause, s/s, need for diagnostic tests, labs, when to notify MD Discussed potential effects on organ systems, monitoring, diagnostic tests, labs, and when to notify their MD. Discussed the signs/symptoms of the following: hepatotoxicity   Miscellaneous • Financial Issues: Copay $14 for 1/2 of the prescription.  His insurance won't let him  the  full 42 day supply.  Karon Cai, Patient Care Coordinator will reach out to him to refill the other half of his Rx before he needs it.  • Lab Draws: Weekly during radiation   Infertility and Sexuality:  causes, fertility preservation options, sexuality changes, ways to manage, importance of birth control Oral Oncology Therapy: Reviewed safe sex practices and minimizing exposure to body fluids while on oral oncology therapy.   Home Care: how to manage bodily fluids Counseled on management of soiled linens and proper flush technique.  Discussed how to manage all the side effects at home and advised when to contact the MD office   Survivorship: distress, distress assessment, secondary malignancies, early/late effects, follow-up, social issues, social support Discussed the rare, but possible risk of secondary malignancies months to years after treatment, most commonly Acute myeloid leukemia     Adherence and Self-Administration  • Barriers to Patient Adherence and/or Self-Administration: None  • Methods for Supporting Patient Adherence and/or Self-Administration: dosing calendar  • Expected duration of therapy: for the duration of concurrent radiation    Goals of Therapy  • Patient Goals of Therapy:   o Consistently take medications as prescribed  o Patient will adhere to medication regimen  o Patient will report any medication side effects to healthcare provider  • Clinical Goals:   o Support patient understanding of medication regimen  o Ensure patient knows the pharmacy contact information  o Schedule regular follow-up to monitor the treatment serious adverse events  o Schedule regular follow-up to confirm medication adherence  o Schedule regular follow-up to monitor disease progression or stabilty    Quality of Life Assessment   The patient's current (pre-therapy) quality of life was discussed with the patient. The QOL segment of this outreach has been reviewed and updated.   • Quality of Life Score:  10/10    Reassessment Plan & Follow-Up  1. Pharmacist to perform regular reassessments no more than (6) months from the previous assessment.  2. Welcome information and patient satisfaction survey to be sent by retail team with patient's initial fill.  3. Care Coordinator to set up future refill outreaches, coordinate prescription delivery, and escalate clinical questions to pharmacist.     Additional Plans, Therapy Recommendations or Therapy Problems to Be Addressed: The pharmacy did not have enough temozolomide to dispense 28-days to him today.  He isn't expected to start radiation for 2 weeks and the pharmacy should have the medication tomorrow morning in the order.  The Patient Care Coordinator will coordinate shipping the 28-day supply to him by [place], then will refill the remaining 28-day supply before he needs that.     Attestation  I attest that the initiated specialty medication is appropriate for the patient based on my assessment.  If the prescribed therapy is at any point deemed not appropriate based on the current or future assessments, a consultation will be initiated with the patient's specialty care provider to determine the best course of action. The revised plan of therapy will be documented along with any additional patient education provided.     Adina Garcia, Yael, [facility]  Oncology Clinical Pharmacist    Date and Time: 10/12/2022 11:53 EDT

## 2022-10-17 RX ORDER — TEMOZOLOMIDE 180 MG/1
180 CAPSULE ORAL
Qty: 42 CAPSULE | Refills: 0 | Status: SHIPPED | OUTPATIENT
Start: 2022-10-17 | End: 2022-11-15 | Stop reason: SDUPTHER

## 2022-10-17 NOTE — PROGRESS NOTES
Drug: temozolomide  Strength: 180mg capsule  Directions: Take 1 capsule PO every night at bedtime on Days 1-42 during radiation treatment  QTY: 42  RF:0    Released to pharmacy: [facility]    Completed independent double check on medication order/RX.

## 2022-10-18 PROCEDURE — 77338 DESIGN MLC DEVICE FOR IMRT: CPT | Performed by: RADIOLOGY

## 2022-10-18 PROCEDURE — 77301 RADIOTHERAPY DOSE PLAN IMRT: CPT | Performed by: RADIOLOGY

## 2022-10-18 PROCEDURE — 77300 RADIATION THERAPY DOSE PLAN: CPT | Performed by: RADIOLOGY

## 2022-10-24 ENCOUNTER — HOSPITAL ENCOUNTER (OUTPATIENT)
Dept: RADIATION ONCOLOGY | Facility: HOSPITAL | Age: 52
Discharge: HOME | End: 2022-10-24

## 2022-10-24 PROCEDURE — 77386: CPT | Performed by: RADIOLOGY

## 2022-10-24 RX ORDER — LEVETIRACETAM 500 MG/1
TABLET ORAL
Qty: 60 TABLET | Refills: 1 | OUTPATIENT
Start: 2022-10-24

## 2022-10-24 RX ORDER — AMLODIPINE BESYLATE 10 MG/1
10 TABLET ORAL
Qty: 30 TABLET | Refills: 1 | OUTPATIENT
Start: 2022-10-24

## 2022-10-25 ENCOUNTER — HOSPITAL ENCOUNTER (OUTPATIENT)
Dept: RADIATION ONCOLOGY | Facility: HOSPITAL | Age: 52
Discharge: HOME | End: 2022-10-25

## 2022-10-25 VITALS — BODY MASS INDEX: 28.83 KG/M2 | WEIGHT: 212.6 LBS

## 2022-10-25 PROCEDURE — 77386: CPT | Performed by: RADIOLOGY

## 2022-10-26 ENCOUNTER — DOCUMENTATION (OUTPATIENT)
Dept: NUTRITION | Facility: HOSPITAL | Age: 52
End: 2022-10-26

## 2022-10-26 ENCOUNTER — HOSPITAL ENCOUNTER (OUTPATIENT)
Dept: RADIATION ONCOLOGY | Facility: HOSPITAL | Age: 52
Discharge: HOME | End: 2022-10-26

## 2022-10-26 PROCEDURE — 77386: CPT | Performed by: RADIOLOGY

## 2022-10-26 NOTE — PROGRESS NOTES
"Outpatient Oncology Nutrition     Reason for Visit:     Oncology Nutrition Screening and Patient Education    Patient Name:  Nick Hancock    :  1970    MRN:  [id number]    Date of Encounter: 10/26/2022    Nutrition Assessment     Diagnosis: Glioblastoma / new areas of acute infarction involving the anterior left temporal lobe and the parasagittal anterior right frontal lobe    Surgery:  22  Dr. Patton / craniotomy and complete resection of the left frontal supratentorial tumor    Chemotherapy:  Temodar concurrently with radiation    Radiation:  60 Gray in 30 fractions with IGR T and IMRT /   Patient has completed 2 fractions / treatment started 10/24/22    Patient Active Problem List   Diagnosis   • Essential hypertension   • Dyslipidemia   • Bradycardia, sinus   • Encounter for colorectal cancer screening   • Glioblastoma (HCC)   • Difficulty with speech   • Hyperglycemia   • Acute respiratory failure with hypoxia (HCC)       Food / Nutrition Related History       Hydration Status     Goal: 105 ounces    Enteral Feeding     NA  Anthropometric Measurements     Height:    Ht Readings from Last 1 Encounters:   10/12/22 182.9 cm (72.01\")       Weight:    Wt Readings from Last 1 Encounters:   10/25/22 96.4 kg (212 lb 9.6 oz)       BMI:  28.83 / overweight    Weight Change:  UBW ~ 220 lbs / lost approximately 8 lbs from UBW related to N/V prior to admission (3.6% weight loss)    Review of Lab Data (Time Frame - 1 month / 2 month)     Component Ref Range & Units 1 mo ago   (22) 1 mo ago   (22) 1 mo ago   (22) 1 mo ago   (22) 1 mo ago   (22) 1 mo ago   (22) 1 mo ago   (22)   Glucose 65 - 99 mg/dL 164 High   149 High  R, CM  156 High  R, CM  291 High  R, CM  191 High  R, CM  273 High  R, CM  137 High  R, CM    BUN 6 - 20 mg/dL 20          Creatinine 0.76 - 1.27 mg/dL 0.73 Low           Sodium 136 - 145 mmol/L 134 Low           Potassium 3.5 - 5.2 mmol/L 4.1        "   Chloride 98 - 107 mmol/L 97 Low           CO2 22.0 - 29.0 mmol/L 24.0          Calcium 8.6 - 10.5 mg/dL 8.9          Total Protein 6.0 - 8.5 g/dL 6.1          Albumin 3.50 - 5.20 g/dL 3.70          ALT (SGPT) 1 - 41 U/L 19          AST (SGOT) 1 - 40 U/L 19          Alkaline Phosphatase 39 - 117 U/L 72          Total Bilirubin 0.0 - 1.2 mg/dL 1.8 High           Globulin gm/dL 2.4          Comment: Calculated Result   A/G Ratio g/dL 1.5          BUN/Creatinine Ratio 7.0 - 25.0 27.4 High           Anion Gap 5.0 - 15.0 mmol/L 13.0          eGFR >60.0 mL/min/1.73            Component Ref Range & Units 1 mo ago   (9/26/22) 1 mo ago   (9/25/22) 1 mo ago   (9/25/22) 1 mo ago   (9/25/22) 1 mo ago   (9/25/22) 1 mo ago   (9/24/22) 1 mo ago   (9/24/22)   Glucose 70 - 130 mg/dL 163 High   283 High  CM  292 High  CM  217 High  CM  150 High  CM  335 High  CM  217 High  CM          Medication Review   Temodar / taken at bedtime nightly concurrent with radiation    Nutrition Focused Physical Findings   Patient physically fit with no visible muscle / fat loss    Nutrition Impact Symptoms     Fatigue  Taste changes / salt sensitive  Dry mouth    Physical Activity      Not feeling up to most things, but in bed less than half the day    Current Nutritional Intake     Oral diet:  Regular    Oral nutritional supplements:    Intake:    Malnutrition Risk Assessment     Recent weight loss over the past 6 months:  Yes    How much weight loss:  1 = 2-13 lbs    Eating poorly because of a decreased appetite:  1 = Yes    Malnutrition Screening Score:     MST = 2 more Patient at risk for malnutrition     Nutrition Diagnosis     Problem    Etiology    Signs / Symptoms      Nutrition Intervention   Initial consultation with patient and his  during RAD ONC status checks.  Patient is at risk for malnutrition with documented weight loss and appetite changes, but does not exhibit characteristics related to malnutrition at this time.    Patient  lives with his  and has a very extended support system who are actively involved in his care and support.  He states that since prior to hospital admission he has experienced an increased sensitivity to salt.  He states that even though no salt / sodium is used in his food preparation, he experiences an elevated taste aversion to salt.  He experienced a poor appetite on admission to [facility] and had experienced N/V for a couple of weeks prior to admission / that issue is resolving and oral intake has improved.    Discussed the importance of nutrition with diagnosis and treatment plan, focusing on adequate calorie, protein, nutrient and hydration. Discussed the possible side effects of treatment and tips for nutritional management including constipation, fatigue, appetite and taste changes, dry mouth.  Stressed importance of good oral hygiene; provided recipe for baking soda, salt and water mouth rinse with encouragement to use 5-6 times per day if he is able to use it with the salt content. Provided suggestions for foods that would enhance salivary production and increase appetitie.    Goal   1. To maintain weight and prevent additional weight loss during chemoradiation.  2. Achieve nutrient and hydration goals via oral intake   3. Provide patient education for management of nutritionally related side effects of treatment.    Calories: 1194-3972  Protein: 120 grams +  Hydration: 105 ounces +    Monitoring / Evaluation     Will continue to follow during status checks.

## 2022-10-27 ENCOUNTER — HOSPITAL ENCOUNTER (OUTPATIENT)
Dept: RADIATION ONCOLOGY | Facility: HOSPITAL | Age: 52
Discharge: HOME | End: 2022-10-27

## 2022-10-27 PROCEDURE — 77386: CPT | Performed by: RADIOLOGY

## 2022-10-28 ENCOUNTER — HOSPITAL ENCOUNTER (OUTPATIENT)
Dept: RADIATION ONCOLOGY | Facility: HOSPITAL | Age: 52
Discharge: HOME | End: 2022-10-28

## 2022-10-28 PROCEDURE — 77386: CPT | Performed by: RADIOLOGY

## 2022-10-28 PROCEDURE — 77336 RADIATION PHYSICS CONSULT: CPT | Performed by: RADIOLOGY

## 2022-10-31 ENCOUNTER — TELEPHONE (OUTPATIENT)
Dept: GASTROENTEROLOGY | Facility: CLINIC | Age: 52
End: 2022-10-31

## 2022-10-31 ENCOUNTER — HOSPITAL ENCOUNTER (OUTPATIENT)
Dept: RADIATION ONCOLOGY | Facility: HOSPITAL | Age: 52
Discharge: HOME | End: 2022-10-31

## 2022-10-31 PROCEDURE — 77386: CPT | Performed by: RADIOLOGY

## 2022-10-31 NOTE — TELEPHONE ENCOUNTER
The patient's spouse called to cancel colonoscopy scheduled for 11/01/2022.  He states the patient is currently undergoing chemo.

## 2022-11-01 ENCOUNTER — HOSPITAL ENCOUNTER (OUTPATIENT)
Dept: RADIATION ONCOLOGY | Facility: HOSPITAL | Age: 52
Setting detail: RADIATION/ONCOLOGY SERIES
Discharge: HOME | End: 2022-11-01

## 2022-11-01 VITALS — WEIGHT: 215.3 LBS | BODY MASS INDEX: 29.19 KG/M2

## 2022-11-01 PROCEDURE — 77386: CPT | Performed by: RADIOLOGY

## 2022-11-02 ENCOUNTER — HOSPITAL ENCOUNTER (OUTPATIENT)
Dept: RADIATION ONCOLOGY | Facility: HOSPITAL | Age: 52
Discharge: HOME | End: 2022-11-02

## 2022-11-02 PROCEDURE — 77386: CPT | Performed by: RADIOLOGY

## 2022-11-03 ENCOUNTER — HOSPITAL ENCOUNTER (OUTPATIENT)
Dept: RADIATION ONCOLOGY | Facility: HOSPITAL | Age: 52
Discharge: HOME | End: 2022-11-03

## 2022-11-03 PROCEDURE — 77386: CPT | Performed by: RADIOLOGY

## 2022-11-03 PROCEDURE — 77336 RADIATION PHYSICS CONSULT: CPT | Performed by: RADIOLOGY

## 2022-11-04 ENCOUNTER — HOSPITAL ENCOUNTER (OUTPATIENT)
Dept: RADIATION ONCOLOGY | Facility: HOSPITAL | Age: 52
Discharge: HOME | End: 2022-11-04

## 2022-11-04 PROCEDURE — 77386: CPT | Performed by: RADIOLOGY

## 2022-11-07 ENCOUNTER — OFFICE VISIT (OUTPATIENT)
Dept: ONCOLOGY | Facility: CLINIC | Age: 52
End: 2022-11-07

## 2022-11-07 ENCOUNTER — LAB (OUTPATIENT)
Dept: LAB | Facility: HOSPITAL | Age: 52
End: 2022-11-07

## 2022-11-07 ENCOUNTER — HOSPITAL ENCOUNTER (OUTPATIENT)
Dept: RADIATION ONCOLOGY | Facility: HOSPITAL | Age: 52
Discharge: HOME | End: 2022-11-07

## 2022-11-07 ENCOUNTER — SPECIALTY PHARMACY (OUTPATIENT)
Dept: ONCOLOGY | Facility: HOSPITAL | Age: 52
End: 2022-11-07

## 2022-11-07 ENCOUNTER — OFFICE VISIT (OUTPATIENT)
Dept: NEUROSURGERY | Facility: CLINIC | Age: 52
End: 2022-11-07

## 2022-11-07 VITALS
SYSTOLIC BLOOD PRESSURE: 116 MMHG | TEMPERATURE: 97.8 F | BODY MASS INDEX: 29.31 KG/M2 | DIASTOLIC BLOOD PRESSURE: 70 MMHG | WEIGHT: 216.4 LBS | HEIGHT: 72 IN

## 2022-11-07 VITALS
HEART RATE: 80 BPM | SYSTOLIC BLOOD PRESSURE: 116 MMHG | RESPIRATION RATE: 18 BRPM | DIASTOLIC BLOOD PRESSURE: 70 MMHG | WEIGHT: 216 LBS | HEIGHT: 72 IN | BODY MASS INDEX: 29.26 KG/M2 | TEMPERATURE: 97.3 F | OXYGEN SATURATION: 98 %

## 2022-11-07 DIAGNOSIS — C71.9 GLIOBLASTOMA: Primary | ICD-10-CM

## 2022-11-07 DIAGNOSIS — C71.9 GLIOBLASTOMA: ICD-10-CM

## 2022-11-07 LAB
ALBUMIN SERPL-MCNC: 4.3 G/DL (ref 3.5–5.2)
ALBUMIN/GLOB SERPL: 1.4 G/DL
ALP SERPL-CCNC: 115 U/L (ref 39–117)
ALT SERPL W P-5'-P-CCNC: 30 U/L (ref 1–41)
ANION GAP SERPL CALCULATED.3IONS-SCNC: 13 MMOL/L (ref 5–15)
AST SERPL-CCNC: 23 U/L (ref 1–40)
BASOPHILS # BLD AUTO: 0.09 10*3/MM3 (ref 0–0.2)
BASOPHILS NFR BLD AUTO: 1 % (ref 0–1.5)
BILIRUB SERPL-MCNC: 0.9 MG/DL (ref 0–1.2)
BUN SERPL-MCNC: 16 MG/DL (ref 6–20)
BUN/CREAT SERPL: 16.3 (ref 7–25)
CALCIUM SPEC-SCNC: 9.3 MG/DL (ref 8.6–10.5)
CHLORIDE SERPL-SCNC: 99 MMOL/L (ref 98–107)
CO2 SERPL-SCNC: 25 MMOL/L (ref 22–29)
CREAT SERPL-MCNC: 0.98 MG/DL (ref 0.76–1.27)
DEPRECATED RDW RBC AUTO: 49.1 FL (ref 37–54)
EGFRCR SERPLBLD CKD-EPI 2021: 92.2 ML/MIN/1.73
EOSINOPHIL # BLD AUTO: 0.16 10*3/MM3 (ref 0–0.4)
EOSINOPHIL NFR BLD AUTO: 1.8 % (ref 0.3–6.2)
ERYTHROCYTE [DISTWIDTH] IN BLOOD BY AUTOMATED COUNT: 14.4 % (ref 12.3–15.4)
GLOBULIN UR ELPH-MCNC: 3.1 GM/DL
GLUCOSE SERPL-MCNC: 217 MG/DL (ref 65–99)
HCT VFR BLD AUTO: 37.7 % (ref 37.5–51)
HGB BLD-MCNC: 12.9 G/DL (ref 13–17.7)
IMM GRANULOCYTES # BLD AUTO: 0.06 10*3/MM3 (ref 0–0.05)
IMM GRANULOCYTES NFR BLD AUTO: 0.7 % (ref 0–0.5)
LYMPHOCYTES # BLD AUTO: 2.92 10*3/MM3 (ref 0.7–3.1)
LYMPHOCYTES NFR BLD AUTO: 32.4 % (ref 19.6–45.3)
MCH RBC QN AUTO: 31.4 PG (ref 26.6–33)
MCHC RBC AUTO-ENTMCNC: 34.2 G/DL (ref 31.5–35.7)
MCV RBC AUTO: 91.7 FL (ref 79–97)
MONOCYTES # BLD AUTO: 0.87 10*3/MM3 (ref 0.1–0.9)
MONOCYTES NFR BLD AUTO: 9.6 % (ref 5–12)
NEUTROPHILS NFR BLD AUTO: 4.92 10*3/MM3 (ref 1.7–7)
NEUTROPHILS NFR BLD AUTO: 54.5 % (ref 42.7–76)
PLATELET # BLD AUTO: 325 10*3/MM3 (ref 140–450)
PMV BLD AUTO: 9.3 FL (ref 6–12)
POTASSIUM SERPL-SCNC: 4.2 MMOL/L (ref 3.5–5.2)
PROT SERPL-MCNC: 7.4 G/DL (ref 6–8.5)
RBC # BLD AUTO: 4.11 10*6/MM3 (ref 4.14–5.8)
SODIUM SERPL-SCNC: 137 MMOL/L (ref 136–145)
WBC NRBC COR # BLD: 9.02 10*3/MM3 (ref 3.4–10.8)

## 2022-11-07 PROCEDURE — 80053 COMPREHEN METABOLIC PANEL: CPT

## 2022-11-07 PROCEDURE — 77386: CPT | Performed by: RADIOLOGY

## 2022-11-07 PROCEDURE — 99214 OFFICE O/P EST MOD 30 MIN: CPT | Performed by: INTERNAL MEDICINE

## 2022-11-07 PROCEDURE — 99024 POSTOP FOLLOW-UP VISIT: CPT | Performed by: STUDENT IN AN ORGANIZED HEALTH CARE EDUCATION/TRAINING PROGRAM

## 2022-11-07 PROCEDURE — 36415 COLL VENOUS BLD VENIPUNCTURE: CPT

## 2022-11-07 PROCEDURE — 85025 COMPLETE CBC W/AUTO DIFF WBC: CPT

## 2022-11-07 NOTE — PROGRESS NOTES
DATE OF VISIT: 11/7/2022    REASON FOR VISIT: Followup for left frontal lobe GBM     PROBLEM LIST:  1.  Left frontal lobe GBM:  A.  Presented with headaches and slurred speech  B.  Diagnosed after craniotomy done by Dr. Beach September 20, 2022  C.  Started definitive concurrent chemotherapy with radiation using Temodar October 2022.  2.  Hemotherapy induced nausea  3.  Hypertension  4.  Seizure disorder    HISTORY OF PRESENT ILLNESS: The patient is a very pleasant 51 y.o. male  with past medical history significant for GBM diagnosed September 20, 2022. The  patient is here today for scheduled follow-up visit.    SUBJECTIVE: Reinaldo is here today with his .  He has been able to tolerate treatment fairly well.  Denies any fever chills night sweats.    Past History:  Medical History: has a past medical history of Diverticulitis, High triglycerides, Hyperlipidemia, Hypertension, Skin cancer, and Sleep apnea.   Surgical History: has a past surgical history that includes Eye surgery (Bilateral, 2008); Skin surgery (2010); Colonoscopy; Craniotomy for Tumor (N/A, 09/21/2022); and Craniotomy (08/21/2022).   Family History: family history includes Cancer in his mother; Heart attack in his mother; Hyperlipidemia in his father; Hypertension in his brother; Melanoma in his mother; Stroke in his father.   Social History: reports that he has quit smoking. His smoking use included cigarettes. His smokeless tobacco use includes snuff. He reports that he does not currently use alcohol. He reports that he does not use drugs.    (Not in a hospital admission)     Allergies: Patient has no known allergies.     Review of Systems   Constitutional: Positive for fatigue.   Respiratory: Negative.    Cardiovascular: Negative.    Gastrointestinal: Negative.    Psychiatric/Behavioral: The patient is nervous/anxious.        PHYSICAL EXAMINATION:   /70   Pulse 80   Temp 97.3 °F (36.3 °C) (Temporal)   Resp 18   Ht 182.9 cm  "(72.01\")   Wt 98 kg (216 lb)   SpO2 98%   BMI 29.29 kg/m²    Pain Score    11/07/22 1101   PainSc: 0-No pain        ECOG score: 0            ECOG Performance Status: 1 - Symptomatic but completely ambulatory      General Appearance:      alert, cooperative, no apparent distress and appears stated age   Lungs:   Clear to auscultation bilaterally; respirations regular, even, and unlabored bilaterally   Heart:  Regular rate and rhythm, no murmurs appreciated   Abdomen:   Soft, non-tender, non-distended and no organomegaly                 Lab on 11/07/2022   Component Date Value Ref Range Status   • WBC 11/07/2022 9.02  3.40 - 10.80 10*3/mm3 Final   • RBC 11/07/2022 4.11 (L)  4.14 - 5.80 10*6/mm3 Final   • Hemoglobin 11/07/2022 12.9 (L)  13.0 - 17.7 g/dL Final   • Hematocrit 11/07/2022 37.7  37.5 - 51.0 % Final   • MCV 11/07/2022 91.7  79.0 - 97.0 fL Final   • MCH 11/07/2022 31.4  26.6 - 33.0 pg Final   • MCHC 11/07/2022 34.2  31.5 - 35.7 g/dL Final   • RDW 11/07/2022 14.4  12.3 - 15.4 % Final   • RDW-SD 11/07/2022 49.1  37.0 - 54.0 fl Final   • MPV 11/07/2022 9.3  6.0 - 12.0 fL Final   • Platelets 11/07/2022 325  140 - 450 10*3/mm3 Final   • Neutrophil % 11/07/2022 54.5  42.7 - 76.0 % Final   • Lymphocyte % 11/07/2022 32.4  19.6 - 45.3 % Final   • Monocyte % 11/07/2022 9.6  5.0 - 12.0 % Final   • Eosinophil % 11/07/2022 1.8  0.3 - 6.2 % Final   • Basophil % 11/07/2022 1.0  0.0 - 1.5 % Final   • Immature Grans % 11/07/2022 0.7 (H)  0.0 - 0.5 % Final   • Neutrophils, Absolute 11/07/2022 4.92  1.70 - 7.00 10*3/mm3 Final   • Lymphocytes, Absolute 11/07/2022 2.92  0.70 - 3.10 10*3/mm3 Final   • Monocytes, Absolute 11/07/2022 0.87  0.10 - 0.90 10*3/mm3 Final   • Eosinophils, Absolute 11/07/2022 0.16  0.00 - 0.40 10*3/mm3 Final   • Basophils, Absolute 11/07/2022 0.09  0.00 - 0.20 10*3/mm3 Final   • Immature Grans, Absolute 11/07/2022 0.06 (H)  0.00 - 0.05 10*3/mm3 Final        No results found.    ASSESSMENT: The patient " is a very pleasant 51 y.o. male  with left frontal lobe GBM      PLAN:    1.  Left frontal lobe GBM:  A.  I will continue Temodar 75 mg per metered square daily.  B.  This is given with concurrent radiation.  C.  I will monitor his blood work including blood counts kidney function liver function and electrolytes.  D.  I did go over the blood the results with the patient from today.  His CBC revealed mild anemia hemoglobin 12.9 with normal white blood cells and platelets.  E.  He will follow-up with us in 2 weeks to evaluate for treatment tolerance.    2.  Brain edema:  A.  He is off steroids completely.    3.  Seizure prophylaxis:  A.  The patient is on Keppra twice a day.  Never had a seizure episode.  He might be able to come off this.  He has been cleared by neurosurgery to do that.  B.  I will go down to once a day for 1 week then every other day for 1 week then will stop completely.    3.  Hypertension:  A.  I will continue lisinopril and Norvasc.    FOLLOW UP: 2 weeks with CBC and CMP.    Shaan Rader MD  11/7/2022

## 2022-11-07 NOTE — PROGRESS NOTES
"NEUROSURGERY PROGRESS NOTE    Chief Complaint: Status post resection of a left frontal glioblastoma    Subjective: This is a 51-year-old male who underwent resection of a large left frontal glioblastoma approximately 6 weeks ago.  He comes in today for follow-up.  Overall, the patient is doing extremely well.  He is at home and living independently.  He has had no significant issues with his speech or weakness in the right side.  He is already undergone adjuvant therapy and is currently receiving chemotherapy and radiation.  Overall, he is very pleased with his progress    Objective    Vital Signs: Blood pressure 116/70, temperature 97.8 °F (36.6 °C), temperature source Infrared, height 182.9 cm (72\"), weight 98.2 kg (216 lb 6.4 oz).    Physical Exam  Awake, alert and oriented x 3  Opens eyes spont  Pupils 3 mm rx bilat  Extraocular muscles intact bilaterally  Face symmetric bilaterally  Tongue midline  5/5 in all 4 ext  Incision clean dry and intact    Current Medications:   Current Outpatient Medications:   •  amLODIPine (NORVASC) 10 MG tablet, Take 1 tablet by mouth Daily., Disp: 30 tablet, Rfl: 1  •  levETIRAcetam (KEPPRA) 500 MG tablet, Take 1 tablet by mouth 2 (Two) Times a Day., Disp: 60 tablet, Rfl: 1  •  lisinopril (PRINIVIL,ZESTRIL) 20 MG tablet, Take 20 mg by mouth Daily., Disp: , Rfl:   •  multivitamin with minerals tablet tablet, Take 1 tablet by mouth Daily., Disp: 90 each, Rfl: 0  •  niacin 500 MG tablet, Take 500 mg by mouth Every Night., Disp: , Rfl:   •  ondansetron (ZOFRAN) 8 MG tablet, Take 1 tablet by mouth 3 (Three) Times a Day As Needed for Nausea or Vomiting., Disp: 30 tablet, Rfl: 5  •  sulfamethoxazole-trimethoprim (BACTRIM DS,SEPTRA DS) 800-160 MG per tablet, Take 1 tablet by mouth 3 (Three) Times a Week. Take 1 tablet on Mondays, Wednesdays and Fridays., Disp: 12 tablet, Rfl: 7  •  temozolomide (Temodar) 180 MG chemo capsule, Take 1 capsule by mouth every night at bedtime on days 1-42 " during radiation treatment., Disp: 42 capsule, Rfl: 0  •  VASCEPA 1 g capsule capsule, , Disp: , Rfl:      Laboratory Results:                              Brief Urine Lab Results     None        Microbiology Results (last 10 days)     ** No results found for the last 240 hours. **          Diagnostic Imaging: I reviewed and independently interpreted the new imaging.     Assessment/Plan:  This is a 51-year-old male status post resection of a large left frontal glioblastoma approximately 6 weeks ago.  Clinically, patient is doing very well.  He is neurologically intact and is at home living independently.  He is already undergoing adjuvant therapy with chemotherapy and radiation.  I will defer to oncology for further management of this.  I am okay with him stopping his Keppra.  We will plan to have the patient follow-up with me at the beginning of the new year with a new brain MRI with and without contrast.    Diagnoses and all orders for this visit:    1. Glioblastoma (HCC) (Primary)  -     MRI Brain With & Without Contrast; Future        Umair Patton MD  11/07/22  10:33 EST

## 2022-11-08 ENCOUNTER — HOSPITAL ENCOUNTER (OUTPATIENT)
Dept: RADIATION ONCOLOGY | Facility: HOSPITAL | Age: 52
Discharge: HOME | End: 2022-11-08

## 2022-11-08 VITALS — BODY MASS INDEX: 29.52 KG/M2 | WEIGHT: 217.7 LBS

## 2022-11-08 PROCEDURE — 77386: CPT | Performed by: RADIOLOGY

## 2022-11-09 ENCOUNTER — HOSPITAL ENCOUNTER (OUTPATIENT)
Dept: RADIATION ONCOLOGY | Facility: HOSPITAL | Age: 52
Discharge: HOME | End: 2022-11-09

## 2022-11-09 PROCEDURE — 77386: CPT | Performed by: RADIOLOGY

## 2022-11-10 ENCOUNTER — HOSPITAL ENCOUNTER (OUTPATIENT)
Dept: RADIATION ONCOLOGY | Facility: HOSPITAL | Age: 52
Discharge: HOME | End: 2022-11-10

## 2022-11-10 PROCEDURE — 77386: CPT | Performed by: RADIOLOGY

## 2022-11-10 PROCEDURE — 77336 RADIATION PHYSICS CONSULT: CPT | Performed by: RADIOLOGY

## 2022-11-11 ENCOUNTER — HOSPITAL ENCOUNTER (OUTPATIENT)
Dept: RADIATION ONCOLOGY | Facility: HOSPITAL | Age: 52
Discharge: HOME | End: 2022-11-11

## 2022-11-11 PROCEDURE — 77386: CPT | Performed by: RADIOLOGY

## 2022-11-14 ENCOUNTER — HOSPITAL ENCOUNTER (OUTPATIENT)
Dept: RADIATION ONCOLOGY | Facility: HOSPITAL | Age: 52
Discharge: HOME | End: 2022-11-14

## 2022-11-14 PROCEDURE — 77386: CPT | Performed by: RADIOLOGY

## 2022-11-15 ENCOUNTER — HOSPITAL ENCOUNTER (OUTPATIENT)
Dept: RADIATION ONCOLOGY | Facility: HOSPITAL | Age: 52
Discharge: HOME | End: 2022-11-15

## 2022-11-15 ENCOUNTER — SPECIALTY PHARMACY (OUTPATIENT)
Dept: ONCOLOGY | Facility: HOSPITAL | Age: 52
End: 2022-11-15

## 2022-11-15 VITALS — BODY MASS INDEX: 29.7 KG/M2 | WEIGHT: 219 LBS

## 2022-11-15 PROCEDURE — 77386: CPT | Performed by: RADIOLOGY

## 2022-11-15 RX ORDER — TEMOZOLOMIDE 180 MG/1
180 CAPSULE ORAL
Qty: 14 CAPSULE | Refills: 0 | Status: SHIPPED | OUTPATIENT
Start: 2022-11-15 | End: 2022-11-15 | Stop reason: SDUPTHER

## 2022-11-15 RX ORDER — TEMOZOLOMIDE 180 MG/1
180 CAPSULE ORAL
Qty: 14 CAPSULE | Refills: 0 | Status: SHIPPED | OUTPATIENT
Start: 2022-11-15 | End: 2023-02-15 | Stop reason: ALTCHOICE

## 2022-11-16 ENCOUNTER — HOSPITAL ENCOUNTER (OUTPATIENT)
Dept: RADIATION ONCOLOGY | Facility: HOSPITAL | Age: 52
Discharge: HOME | End: 2022-11-16

## 2022-11-16 PROCEDURE — 77386: CPT | Performed by: RADIOLOGY

## 2022-11-17 ENCOUNTER — HOSPITAL ENCOUNTER (OUTPATIENT)
Dept: RADIATION ONCOLOGY | Facility: HOSPITAL | Age: 52
Discharge: HOME | End: 2022-11-17

## 2022-11-17 PROCEDURE — 77386: CPT | Performed by: RADIOLOGY

## 2022-11-18 ENCOUNTER — HOSPITAL ENCOUNTER (OUTPATIENT)
Dept: RADIATION ONCOLOGY | Facility: HOSPITAL | Age: 52
Discharge: HOME | End: 2022-11-18

## 2022-11-18 PROCEDURE — 77336 RADIATION PHYSICS CONSULT: CPT | Performed by: RADIOLOGY

## 2022-11-18 PROCEDURE — 77386: CPT | Performed by: RADIOLOGY

## 2022-11-20 ENCOUNTER — HOSPITAL ENCOUNTER (OUTPATIENT)
Dept: RADIATION ONCOLOGY | Facility: HOSPITAL | Age: 52
Discharge: HOME | End: 2022-11-20

## 2022-11-20 PROCEDURE — 77386: CPT | Performed by: RADIOLOGY

## 2022-11-21 ENCOUNTER — HOSPITAL ENCOUNTER (OUTPATIENT)
Dept: RADIATION ONCOLOGY | Facility: HOSPITAL | Age: 52
Discharge: HOME | End: 2022-11-21

## 2022-11-21 ENCOUNTER — SPECIALTY PHARMACY (OUTPATIENT)
Dept: ONCOLOGY | Facility: HOSPITAL | Age: 52
End: 2022-11-21

## 2022-11-21 ENCOUNTER — LAB (OUTPATIENT)
Dept: LAB | Facility: HOSPITAL | Age: 52
End: 2022-11-21

## 2022-11-21 ENCOUNTER — OFFICE VISIT (OUTPATIENT)
Dept: ONCOLOGY | Facility: CLINIC | Age: 52
End: 2022-11-21

## 2022-11-21 VITALS
WEIGHT: 218 LBS | HEART RATE: 69 BPM | HEIGHT: 72 IN | BODY MASS INDEX: 29.53 KG/M2 | SYSTOLIC BLOOD PRESSURE: 121 MMHG | DIASTOLIC BLOOD PRESSURE: 72 MMHG | TEMPERATURE: 97.3 F | RESPIRATION RATE: 18 BRPM | OXYGEN SATURATION: 97 %

## 2022-11-21 DIAGNOSIS — C71.9 GLIOBLASTOMA: ICD-10-CM

## 2022-11-21 DIAGNOSIS — C71.9 GLIOBLASTOMA: Primary | ICD-10-CM

## 2022-11-21 LAB
ALBUMIN SERPL-MCNC: 4.6 G/DL (ref 3.5–5.2)
ALBUMIN/GLOB SERPL: 1.8 G/DL
ALP SERPL-CCNC: 91 U/L (ref 39–117)
ALT SERPL W P-5'-P-CCNC: 25 U/L (ref 1–41)
ANION GAP SERPL CALCULATED.3IONS-SCNC: 9 MMOL/L (ref 5–15)
AST SERPL-CCNC: 25 U/L (ref 1–40)
BASOPHILS # BLD AUTO: 0.07 10*3/MM3 (ref 0–0.2)
BASOPHILS NFR BLD AUTO: 0.9 % (ref 0–1.5)
BILIRUB SERPL-MCNC: 1.1 MG/DL (ref 0–1.2)
BUN SERPL-MCNC: 10 MG/DL (ref 6–20)
BUN/CREAT SERPL: 10.4 (ref 7–25)
CALCIUM SPEC-SCNC: 9.4 MG/DL (ref 8.6–10.5)
CHLORIDE SERPL-SCNC: 103 MMOL/L (ref 98–107)
CO2 SERPL-SCNC: 28 MMOL/L (ref 22–29)
CREAT SERPL-MCNC: 0.96 MG/DL (ref 0.76–1.27)
DEPRECATED RDW RBC AUTO: 48.1 FL (ref 37–54)
EGFRCR SERPLBLD CKD-EPI 2021: 94.5 ML/MIN/1.73
EOSINOPHIL # BLD AUTO: 0.45 10*3/MM3 (ref 0–0.4)
EOSINOPHIL NFR BLD AUTO: 5.7 % (ref 0.3–6.2)
ERYTHROCYTE [DISTWIDTH] IN BLOOD BY AUTOMATED COUNT: 14 % (ref 12.3–15.4)
GLOBULIN UR ELPH-MCNC: 2.6 GM/DL
GLUCOSE SERPL-MCNC: 114 MG/DL (ref 65–99)
HCT VFR BLD AUTO: 38.3 % (ref 37.5–51)
HGB BLD-MCNC: 13.1 G/DL (ref 13–17.7)
IMM GRANULOCYTES # BLD AUTO: 0.02 10*3/MM3 (ref 0–0.05)
IMM GRANULOCYTES NFR BLD AUTO: 0.3 % (ref 0–0.5)
LYMPHOCYTES # BLD AUTO: 3.34 10*3/MM3 (ref 0.7–3.1)
LYMPHOCYTES NFR BLD AUTO: 42.1 % (ref 19.6–45.3)
MCH RBC QN AUTO: 31.3 PG (ref 26.6–33)
MCHC RBC AUTO-ENTMCNC: 34.2 G/DL (ref 31.5–35.7)
MCV RBC AUTO: 91.4 FL (ref 79–97)
MONOCYTES # BLD AUTO: 0.76 10*3/MM3 (ref 0.1–0.9)
MONOCYTES NFR BLD AUTO: 9.6 % (ref 5–12)
NEUTROPHILS NFR BLD AUTO: 3.29 10*3/MM3 (ref 1.7–7)
NEUTROPHILS NFR BLD AUTO: 41.4 % (ref 42.7–76)
PLATELET # BLD AUTO: 258 10*3/MM3 (ref 140–450)
PMV BLD AUTO: 9 FL (ref 6–12)
POTASSIUM SERPL-SCNC: 4.3 MMOL/L (ref 3.5–5.2)
PROT SERPL-MCNC: 7.2 G/DL (ref 6–8.5)
RBC # BLD AUTO: 4.19 10*6/MM3 (ref 4.14–5.8)
SODIUM SERPL-SCNC: 140 MMOL/L (ref 136–145)
WBC NRBC COR # BLD: 7.93 10*3/MM3 (ref 3.4–10.8)

## 2022-11-21 PROCEDURE — 36415 COLL VENOUS BLD VENIPUNCTURE: CPT

## 2022-11-21 PROCEDURE — 85025 COMPLETE CBC W/AUTO DIFF WBC: CPT

## 2022-11-21 PROCEDURE — 99214 OFFICE O/P EST MOD 30 MIN: CPT | Performed by: NURSE PRACTITIONER

## 2022-11-21 PROCEDURE — 77386: CPT | Performed by: RADIOLOGY

## 2022-11-21 PROCEDURE — 80053 COMPREHEN METABOLIC PANEL: CPT

## 2022-11-21 NOTE — PROGRESS NOTES
"DATE OF VISIT: 11/21/2022    REASON FOR VISIT: Followup for left frontal lobe GBM     PROBLEM LIST:  1.  Left frontal lobe GBM:  A.  Presented with headaches and slurred speech  B.  Diagnosed after craniotomy done by Dr. Beach September 20, 2022  C.  Started definitive concurrent chemotherapy with radiation using Temodar October 2022.  2.  Hemotherapy induced nausea  3.  Hypertension  4.  Seizure disorder    HISTORY OF PRESENT ILLNESS: The patient is a very pleasant 51 y.o. male  with past medical history significant for GBM diagnosed September 20, 2022. The  patient is here today for scheduled follow-up visit.    SUBJECTIVE: Reinaldo is here today with his . He has been doing fairly well. He complains of some mild \"fogginess\" in his thought process over the last couple days. He also has some mild swelling in his lower extremities, bilaterally, that he noticed first las evening but is better this morning. He is stopping Keppra today. He has had no seizures or increased headaches. He is tolerating Temodar well without nausea or vomiting.    Past History:  Medical History: has a past medical history of Diverticulitis, High triglycerides, Hyperlipidemia, Hypertension, Skin cancer, and Sleep apnea.   Surgical History: has a past surgical history that includes Eye surgery (Bilateral, 2008); Skin surgery (2010); Colonoscopy; Craniotomy for Tumor (N/A, 09/21/2022); and Craniotomy (08/21/2022).   Family History: family history includes Cancer in his mother; Heart attack in his mother; Hyperlipidemia in his father; Hypertension in his brother; Melanoma in his mother; Stroke in his father.   Social History: reports that he has quit smoking. His smoking use included cigarettes. His smokeless tobacco use includes snuff. He reports that he does not currently use alcohol. He reports that he does not use drugs.    (Not in a hospital admission)     Allergies: Patient has no known allergies.     Review of Systems " "  Constitutional: Positive for fatigue.   Cardiovascular: Positive for leg swelling.   Neurological:        \"fogginess\"       PHYSICAL EXAMINATION:   /72   Pulse 69   Temp 97.3 °F (36.3 °C) (Temporal)   Resp 18   Ht 182.9 cm (72.01\")   Wt 98.9 kg (218 lb)   SpO2 97%   BMI 29.56 kg/m²    Pain Score    11/21/22 1322   PainSc: 0-No pain        ECOG score: 0        ECOG Performance Status: 0 - Asymptomatic      General Appearance:      alert, cooperative, no apparent distress and appears stated age   Lungs:   Clear to auscultation bilaterally; respirations regular, even, and unlabored bilaterally   Heart:  Regular rate and rhythm, no murmurs appreciated   Abdomen:   Soft, non-tender, non-distended and no organomegaly                 Lab on 11/21/2022   Component Date Value Ref Range Status   • WBC 11/21/2022 7.93  3.40 - 10.80 10*3/mm3 Final   • RBC 11/21/2022 4.19  4.14 - 5.80 10*6/mm3 Final   • Hemoglobin 11/21/2022 13.1  13.0 - 17.7 g/dL Final   • Hematocrit 11/21/2022 38.3  37.5 - 51.0 % Final   • MCV 11/21/2022 91.4  79.0 - 97.0 fL Final   • MCH 11/21/2022 31.3  26.6 - 33.0 pg Final   • MCHC 11/21/2022 34.2  31.5 - 35.7 g/dL Final   • RDW 11/21/2022 14.0  12.3 - 15.4 % Final   • RDW-SD 11/21/2022 48.1  37.0 - 54.0 fl Final   • MPV 11/21/2022 9.0  6.0 - 12.0 fL Final   • Platelets 11/21/2022 258  140 - 450 10*3/mm3 Final   • Neutrophil % 11/21/2022 41.4 (L)  42.7 - 76.0 % Final   • Lymphocyte % 11/21/2022 42.1  19.6 - 45.3 % Final   • Monocyte % 11/21/2022 9.6  5.0 - 12.0 % Final   • Eosinophil % 11/21/2022 5.7  0.3 - 6.2 % Final   • Basophil % 11/21/2022 0.9  0.0 - 1.5 % Final   • Immature Grans % 11/21/2022 0.3  0.0 - 0.5 % Final   • Neutrophils, Absolute 11/21/2022 3.29  1.70 - 7.00 10*3/mm3 Final   • Lymphocytes, Absolute 11/21/2022 3.34 (H)  0.70 - 3.10 10*3/mm3 Final   • Monocytes, Absolute 11/21/2022 0.76  0.10 - 0.90 10*3/mm3 Final   • Eosinophils, Absolute 11/21/2022 0.45 (H)  0.00 - 0.40 " 10*3/mm3 Final   • Basophils, Absolute 11/21/2022 0.07  0.00 - 0.20 10*3/mm3 Final   • Immature Grans, Absolute 11/21/2022 0.02  0.00 - 0.05 10*3/mm3 Final        No results found.    ASSESSMENT: The patient is a very pleasant 51 y.o. male  with left frontal lobe GBM      PLAN:    1.  Left frontal lobe GBM:  A.  I will continue Temodar 75 mg per metered square daily.  B.  This is given with concurrent radiation. He is scheduled to complete his radiation course on 12/5/2022.   C.  I will monitor his blood work including blood counts, kidney function, liver functions, and electrolytes.  D.  I did go over the blood the results with the patient from today.  His CBC revealed mild anemia hemoglobin 12.9 with normal white blood cells and platelets.  E.  He will follow-up with us in 2 weeks to evaluate for treatment tolerance.  F. We will plan to repeat his MRI 4 weeks after completing radiation. If he has had good response to treatment we will start maintenance Temodar.     2.  Brain edema:  A.  He is off steroids completely.    3.  Seizure prophylaxis:  A.  The patient has weaned off Keppra, with last dose being taken today.     4.  Hypertension:  A.  I will continue lisinopril 20 mg daily. He has been taken off his Norvasc by his PCP secondary to low blood pressure.     5. Lower extremity edema:  A. I advised he push fluids and to elevate his extremities when resting.     FOLLOW UP: 2 weeks with CBC and CMP.    Katelynn Sanchez, APRN  11/21/2022

## 2022-11-22 ENCOUNTER — HOSPITAL ENCOUNTER (OUTPATIENT)
Dept: RADIATION ONCOLOGY | Facility: HOSPITAL | Age: 52
Discharge: HOME | End: 2022-11-22

## 2022-11-22 VITALS — WEIGHT: 220.2 LBS | BODY MASS INDEX: 29.86 KG/M2

## 2022-11-22 PROCEDURE — 77386: CPT | Performed by: RADIOLOGY

## 2022-11-23 ENCOUNTER — HOSPITAL ENCOUNTER (OUTPATIENT)
Dept: RADIATION ONCOLOGY | Facility: HOSPITAL | Age: 52
Discharge: HOME | End: 2022-11-23

## 2022-11-23 PROCEDURE — 77386: CPT | Performed by: RADIOLOGY

## 2022-11-23 PROCEDURE — 77336 RADIATION PHYSICS CONSULT: CPT | Performed by: RADIOLOGY

## 2022-11-28 ENCOUNTER — HOSPITAL ENCOUNTER (OUTPATIENT)
Dept: RADIATION ONCOLOGY | Facility: HOSPITAL | Age: 52
Discharge: HOME | End: 2022-11-28

## 2022-11-28 PROCEDURE — 77386: CPT | Performed by: RADIOLOGY

## 2022-11-29 ENCOUNTER — HOSPITAL ENCOUNTER (OUTPATIENT)
Dept: RADIATION ONCOLOGY | Facility: HOSPITAL | Age: 52
Discharge: HOME | End: 2022-11-29

## 2022-11-29 VITALS — BODY MASS INDEX: 29.37 KG/M2 | WEIGHT: 216.6 LBS

## 2022-11-29 PROCEDURE — 77386: CPT | Performed by: RADIOLOGY

## 2022-11-30 ENCOUNTER — HOSPITAL ENCOUNTER (OUTPATIENT)
Dept: RADIATION ONCOLOGY | Facility: HOSPITAL | Age: 52
Discharge: HOME | End: 2022-11-30

## 2022-11-30 PROCEDURE — 77386: CPT | Performed by: RADIOLOGY

## 2022-12-01 ENCOUNTER — HOSPITAL ENCOUNTER (OUTPATIENT)
Dept: RADIATION ONCOLOGY | Facility: HOSPITAL | Age: 52
Setting detail: RADIATION/ONCOLOGY SERIES
Discharge: HOME | End: 2022-12-01
Payer: OTHER GOVERNMENT

## 2022-12-01 ENCOUNTER — HOSPITAL ENCOUNTER (OUTPATIENT)
Dept: RADIATION ONCOLOGY | Facility: HOSPITAL | Age: 52
Discharge: HOME | End: 2022-12-01

## 2022-12-01 PROCEDURE — 77386: CPT | Performed by: RADIOLOGY

## 2022-12-01 PROCEDURE — 77336 RADIATION PHYSICS CONSULT: CPT | Performed by: RADIOLOGY

## 2022-12-05 ENCOUNTER — LAB (OUTPATIENT)
Dept: LAB | Facility: HOSPITAL | Age: 52
End: 2022-12-05

## 2022-12-05 ENCOUNTER — HOSPITAL ENCOUNTER (OUTPATIENT)
Dept: RADIATION ONCOLOGY | Facility: HOSPITAL | Age: 52
Discharge: HOME | End: 2022-12-05

## 2022-12-05 ENCOUNTER — OFFICE VISIT (OUTPATIENT)
Dept: ONCOLOGY | Facility: CLINIC | Age: 52
End: 2022-12-05

## 2022-12-05 VITALS
RESPIRATION RATE: 18 BRPM | OXYGEN SATURATION: 98 % | TEMPERATURE: 97.5 F | HEIGHT: 72 IN | HEART RATE: 72 BPM | DIASTOLIC BLOOD PRESSURE: 81 MMHG | BODY MASS INDEX: 29.12 KG/M2 | WEIGHT: 215 LBS | SYSTOLIC BLOOD PRESSURE: 139 MMHG

## 2022-12-05 DIAGNOSIS — C71.9 GLIOBLASTOMA: ICD-10-CM

## 2022-12-05 DIAGNOSIS — C71.9 GLIOBLASTOMA: Primary | ICD-10-CM

## 2022-12-05 LAB
ALBUMIN SERPL-MCNC: 4.6 G/DL (ref 3.5–5.2)
ALBUMIN/GLOB SERPL: 1.9 G/DL
ALP SERPL-CCNC: 75 U/L (ref 39–117)
ALT SERPL W P-5'-P-CCNC: 23 U/L (ref 1–41)
ANION GAP SERPL CALCULATED.3IONS-SCNC: 9 MMOL/L (ref 5–15)
AST SERPL-CCNC: 20 U/L (ref 1–40)
BASOPHILS # BLD AUTO: 0.06 10*3/MM3 (ref 0–0.2)
BASOPHILS NFR BLD AUTO: 0.8 % (ref 0–1.5)
BILIRUB SERPL-MCNC: 1.2 MG/DL (ref 0–1.2)
BUN SERPL-MCNC: 13 MG/DL (ref 6–20)
BUN/CREAT SERPL: 12.7 (ref 7–25)
CALCIUM SPEC-SCNC: 9.6 MG/DL (ref 8.6–10.5)
CHLORIDE SERPL-SCNC: 102 MMOL/L (ref 98–107)
CO2 SERPL-SCNC: 30 MMOL/L (ref 22–29)
CREAT SERPL-MCNC: 1.02 MG/DL (ref 0.76–1.27)
DEPRECATED RDW RBC AUTO: 46.3 FL (ref 37–54)
EGFRCR SERPLBLD CKD-EPI 2021: 87.3 ML/MIN/1.73
EOSINOPHIL # BLD AUTO: 0.22 10*3/MM3 (ref 0–0.4)
EOSINOPHIL NFR BLD AUTO: 3 % (ref 0.3–6.2)
ERYTHROCYTE [DISTWIDTH] IN BLOOD BY AUTOMATED COUNT: 13.4 % (ref 12.3–15.4)
GLOBULIN UR ELPH-MCNC: 2.4 GM/DL
GLUCOSE SERPL-MCNC: 153 MG/DL (ref 65–99)
HCT VFR BLD AUTO: 38.5 % (ref 37.5–51)
HGB BLD-MCNC: 13.3 G/DL (ref 13–17.7)
IMM GRANULOCYTES # BLD AUTO: 0.01 10*3/MM3 (ref 0–0.05)
IMM GRANULOCYTES NFR BLD AUTO: 0.1 % (ref 0–0.5)
LYMPHOCYTES # BLD AUTO: 2.26 10*3/MM3 (ref 0.7–3.1)
LYMPHOCYTES NFR BLD AUTO: 31.2 % (ref 19.6–45.3)
MCH RBC QN AUTO: 31.7 PG (ref 26.6–33)
MCHC RBC AUTO-ENTMCNC: 34.5 G/DL (ref 31.5–35.7)
MCV RBC AUTO: 91.9 FL (ref 79–97)
MONOCYTES # BLD AUTO: 0.75 10*3/MM3 (ref 0.1–0.9)
MONOCYTES NFR BLD AUTO: 10.3 % (ref 5–12)
NEUTROPHILS NFR BLD AUTO: 3.95 10*3/MM3 (ref 1.7–7)
NEUTROPHILS NFR BLD AUTO: 54.6 % (ref 42.7–76)
PLATELET # BLD AUTO: 254 10*3/MM3 (ref 140–450)
PMV BLD AUTO: 9 FL (ref 6–12)
POTASSIUM SERPL-SCNC: 4.3 MMOL/L (ref 3.5–5.2)
PROT SERPL-MCNC: 7 G/DL (ref 6–8.5)
RBC # BLD AUTO: 4.19 10*6/MM3 (ref 4.14–5.8)
SODIUM SERPL-SCNC: 141 MMOL/L (ref 136–145)
WBC NRBC COR # BLD: 7.25 10*3/MM3 (ref 3.4–10.8)

## 2022-12-05 PROCEDURE — 85025 COMPLETE CBC W/AUTO DIFF WBC: CPT

## 2022-12-05 PROCEDURE — 99214 OFFICE O/P EST MOD 30 MIN: CPT | Performed by: INTERNAL MEDICINE

## 2022-12-05 PROCEDURE — 36415 COLL VENOUS BLD VENIPUNCTURE: CPT

## 2022-12-05 PROCEDURE — 80053 COMPREHEN METABOLIC PANEL: CPT

## 2022-12-05 PROCEDURE — 77386: CPT | Performed by: RADIOLOGY

## 2022-12-05 NOTE — PROGRESS NOTES
"DATE OF VISIT: 12/5/2022    REASON FOR VISIT: Followup for left frontal lobe GBM     PROBLEM LIST:  1.  Left frontal lobe GBM:  A.  Presented with headaches and slurred speech  B.  Diagnosed after craniotomy done by Dr. Beach September 20, 2022  C.  Started definitive concurrent chemotherapy with radiation using Temodar October 2022.  2.  Hemotherapy induced nausea  3.  Hypertension  4.  Seizure disorder    HISTORY OF PRESENT ILLNESS: The patient is a very pleasant 52 y.o. male  with past medical history significant for GBM diagnosed September 20, 2022. The  patient is here today for scheduled follow-up visit.    SUBJECTIVE: Reinaldo is here today with his .  He denies any fever chills night sweats.  He is anxious to be done with treatment.    Past History:  Medical History: has a past medical history of Diverticulitis, High triglycerides, Hyperlipidemia, Hypertension, Skin cancer, and Sleep apnea.   Surgical History: has a past surgical history that includes Eye surgery (Bilateral, 2008); Skin surgery (2010); Colonoscopy; Craniotomy for Tumor (N/A, 09/21/2022); and Craniotomy (08/21/2022).   Family History: family history includes Cancer in his mother; Heart attack in his mother; Hyperlipidemia in his father; Hypertension in his brother; Melanoma in his mother; Stroke in his father.   Social History: reports that he has quit smoking. His smoking use included cigarettes. His smokeless tobacco use includes snuff. He reports that he does not currently use alcohol. He reports that he does not use drugs.    (Not in a hospital admission)     Allergies: Patient has no known allergies.     Review of Systems   Constitutional: Positive for fatigue.   Cardiovascular: Positive for leg swelling.   Neurological:        \"fogginess\"       PHYSICAL EXAMINATION:   /81   Pulse 72   Temp 97.5 °F (36.4 °C) (Temporal)   Resp 18   Ht 182.9 cm (72.01\")   Wt 97.5 kg (215 lb)   SpO2 98%   BMI 29.15 kg/m²    Pain Score    " 12/05/22 1437   PainSc: 0-No pain        ECOG score: 0        ECOG Performance Status: 1 - Symptomatic but completely ambulatory      General Appearance:      alert, cooperative, no apparent distress and appears stated age   Lungs:   Clear to auscultation bilaterally; respirations regular, even, and unlabored bilaterally   Heart:  Regular rate and rhythm, no murmurs appreciated   Abdomen:   Soft, non-tender, non-distended and no organomegaly                 Lab on 12/05/2022   Component Date Value Ref Range Status   • WBC 12/05/2022 7.25  3.40 - 10.80 10*3/mm3 Final   • RBC 12/05/2022 4.19  4.14 - 5.80 10*6/mm3 Final   • Hemoglobin 12/05/2022 13.3  13.0 - 17.7 g/dL Final   • Hematocrit 12/05/2022 38.5  37.5 - 51.0 % Final   • MCV 12/05/2022 91.9  79.0 - 97.0 fL Final   • MCH 12/05/2022 31.7  26.6 - 33.0 pg Final   • MCHC 12/05/2022 34.5  31.5 - 35.7 g/dL Final   • RDW 12/05/2022 13.4  12.3 - 15.4 % Final   • RDW-SD 12/05/2022 46.3  37.0 - 54.0 fl Final   • MPV 12/05/2022 9.0  6.0 - 12.0 fL Final   • Platelets 12/05/2022 254  140 - 450 10*3/mm3 Final   • Neutrophil % 12/05/2022 54.6  42.7 - 76.0 % Final   • Lymphocyte % 12/05/2022 31.2  19.6 - 45.3 % Final   • Monocyte % 12/05/2022 10.3  5.0 - 12.0 % Final   • Eosinophil % 12/05/2022 3.0  0.3 - 6.2 % Final   • Basophil % 12/05/2022 0.8  0.0 - 1.5 % Final   • Immature Grans % 12/05/2022 0.1  0.0 - 0.5 % Final   • Neutrophils, Absolute 12/05/2022 3.95  1.70 - 7.00 10*3/mm3 Final   • Lymphocytes, Absolute 12/05/2022 2.26  0.70 - 3.10 10*3/mm3 Final   • Monocytes, Absolute 12/05/2022 0.75  0.10 - 0.90 10*3/mm3 Final   • Eosinophils, Absolute 12/05/2022 0.22  0.00 - 0.40 10*3/mm3 Final   • Basophils, Absolute 12/05/2022 0.06  0.00 - 0.20 10*3/mm3 Final   • Immature Grans, Absolute 12/05/2022 0.01  0.00 - 0.05 10*3/mm3 Final        No results found.    ASSESSMENT: The patient is a very pleasant 52 y.o. male  with left frontal lobe GBM      PLAN:    1.  Left frontal lobe  GBM:  A.  I will continue Temodar 75 mg per metered square daily.  B.  This is given with concurrent radiation. He is scheduled to complete his radiation course on 12/6/2022.   C.  I will monitor his blood work including blood counts, kidney function, liver functions, and electrolytes.  D.  I did go over the blood the results with the patient from today.  His CBC is normal.  I will follow-up on his CMP.  E.  He will follow-up with us in 2 weeks to evaluate for treatment tolerance.  F. We will plan to repeat his MRI 4 weeks after completing radiation. If he has had good response to treatment we will start maintenance Temodar.     2.  Brain edema:  A.  He is off steroids completely.    3.  Seizure prophylaxis:  A.  The patient has weaned off Keppra completely.    4.  Hypertension:  A.  I will continue lisinopril 20 mg daily. He has been taken off his Norvasc by his PCP secondary to low blood pressure.     5. Lower extremity edema:  A. I advised he push fluids and to elevate his extremities when resting.     FOLLOW UP: 4 weeks with CBC and CMP and MRI brain.    Shaan Rader MD  12/5/2022

## 2022-12-06 ENCOUNTER — SPECIALTY PHARMACY (OUTPATIENT)
Dept: ONCOLOGY | Facility: HOSPITAL | Age: 52
End: 2022-12-06

## 2022-12-06 ENCOUNTER — HOSPITAL ENCOUNTER (OUTPATIENT)
Dept: RADIATION ONCOLOGY | Facility: HOSPITAL | Age: 52
Discharge: HOME | End: 2022-12-06

## 2022-12-06 VITALS — BODY MASS INDEX: 29.18 KG/M2 | WEIGHT: 215.2 LBS

## 2022-12-06 PROCEDURE — 77386: CPT | Performed by: RADIOLOGY

## 2022-12-07 NOTE — PROGRESS NOTES
DATE OF COMPLETION: 12/6/2022  DIAGNOSIS: GBM    REFERRING: Dr. Rader        Dear Dr. Prasanth Hancock,Nick completed radiation therapy today.      BACKGROUND: Nick Hancock very pleasant gentleman with GBM status postinitial resection being treated with adjuvant chemotherapy and radiation.  The patient was treated as below:    Treatment Summary     Dates of Therapy: 10/24/2022-12/06/22  Treatment Site: Resection cavity  Dose: 60 Gy in 30 fractions of 2 Gy each  Technique: Helical Vicente therapy 6 MV photons Daily image guidance    Treatment Course and Tolerance: The patient tolerated treatment well.  He had minimal acute treatment related toxicity    The initial follow up visit will be in 1 month.    Nick Rodgernathalia knows to call if any problems or concerns develop in the meantime.     Electronically signed by: Shiv Field MD                    Cc: Alicia Devlin PA

## 2023-01-01 ENCOUNTER — TELEPHONE (OUTPATIENT)
Dept: ONCOLOGY | Facility: CLINIC | Age: 53
End: 2023-01-01
Payer: OTHER GOVERNMENT

## 2023-01-01 ENCOUNTER — TELEPHONE (OUTPATIENT)
Dept: ONCOLOGY | Facility: CLINIC | Age: 53
End: 2023-01-01

## 2023-01-01 DIAGNOSIS — C71.9 GLIOBLASTOMA MULTIFORME: ICD-10-CM

## 2023-01-01 DIAGNOSIS — Z51.5 ADMISSION FOR END OF LIFE CARE: ICD-10-CM

## 2023-01-01 RX ORDER — MORPHINE SULFATE 10 MG/5ML
10 SOLUTION ORAL
Qty: 100 ML | Refills: 0 | Status: SHIPPED | OUTPATIENT
Start: 2023-01-01 | End: 2023-01-01

## 2023-01-01 RX ORDER — MORPHINE SULFATE 20 MG/ML
SOLUTION ORAL
Qty: 120 ML | Refills: 0 | Status: SHIPPED | OUTPATIENT
Start: 2023-01-01

## 2023-01-09 ENCOUNTER — OFFICE VISIT (OUTPATIENT)
Dept: NEUROSURGERY | Facility: CLINIC | Age: 53
End: 2023-01-09
Payer: OTHER GOVERNMENT

## 2023-01-09 VITALS
SYSTOLIC BLOOD PRESSURE: 166 MMHG | TEMPERATURE: 97.8 F | HEIGHT: 72 IN | DIASTOLIC BLOOD PRESSURE: 98 MMHG | WEIGHT: 216 LBS | BODY MASS INDEX: 29.26 KG/M2

## 2023-01-09 DIAGNOSIS — C71.9 GLIOBLASTOMA: Primary | ICD-10-CM

## 2023-01-09 PROCEDURE — 99213 OFFICE O/P EST LOW 20 MIN: CPT | Performed by: STUDENT IN AN ORGANIZED HEALTH CARE EDUCATION/TRAINING PROGRAM

## 2023-01-09 NOTE — PROGRESS NOTES
NEUROSURGERY PROGRESS NOTE    Patient: Nick Pachecolcnathalia  : 1970    Primary Care Provider: Alicia Devlin PA    Chief Complaint: Status post resection of left frontal glioblastoma    Subjective: This is a 52-year-old male who underwent resection of a large left frontal glioblastoma in 2022.  He subsequently underwent adjuvant therapy with chemotherapy and radiation.  Currently, he is not on any chemotherapy.  He comes in today.  Overall, the patient is doing very well.  He has intermittent mild headaches and has mild shaking of his left foot on occasion, but otherwise denies any focal neurological deficits or episodes.  He is in good spirits and doing well    Objective    Vital Signs: Blood pressure 166/98, temperature 97.8 °F (36.6 °C), temperature source Infrared, height 182.9 cm (72\"), weight 98 kg (216 lb).    Physical Exam  Awake, alert and oriented x 3  Opens eyes spont  Pupils 3 mm rx bilat  Extraocular muscles intact bilaterally  Face symmetric bilaterally  Tongue midline  5/5 in all 4 ext  No pronator drift  Incision well-healed    Current Medications:   Current Outpatient Medications:   •  lisinopril (PRINIVIL,ZESTRIL) 20 MG tablet, Take 20 mg by mouth Daily., Disp: , Rfl:   •  niacin 500 MG tablet, Take 500 mg by mouth Every Night., Disp: , Rfl:   •  ondansetron (ZOFRAN) 8 MG tablet, Take 1 tablet by mouth 3 (Three) Times a Day As Needed for Nausea or Vomiting., Disp: 30 tablet, Rfl: 5  •  VASCEPA 1 g capsule capsule, , Disp: , Rfl:   •  temozolomide (Temodar) 180 MG chemo capsule, Take 1 capsule by mouth every night at bedtime on days 1-42 during radiation treatment., Disp: 14 capsule, Rfl: 0     Laboratory Results:                              Brief Urine Lab Results     None        Microbiology Results (last 10 days)     ** No results found for the last 240 hours. **          Diagnostic Imaging: I reviewed and independently interpreted the new imaging.      Assessment/Plan:  This is a 52-year-old male who underwent resection of a large left frontal glioblastoma in September 2022.  He is now status post adjuvant chemotherapy and radiation.  He comes in today for follow-up and is doing quite well.  He has moderate headaches at times as well as mild shaking of the left foot, but otherwise denies any focal neurological deficits.  I think he is doing quite well.  He is scheduled to see oncology in the next few days.  I will defer to them for further chemotherapy management.  I am going to reorder the patient's brain MRI to evaluate for any changes.  I will call the patient with the results.  Assuming that this is stable, we will plan to have the patient follow-up with me in another 3 months with a new MRI.    Diagnoses and all orders for this visit:    1. Glioblastoma (HCC) (Primary)        Umair Patton MD  01/09/23  10:56 EST

## 2023-01-10 ENCOUNTER — LAB (OUTPATIENT)
Dept: LAB | Facility: HOSPITAL | Age: 53
End: 2023-01-10
Payer: OTHER GOVERNMENT

## 2023-01-10 ENCOUNTER — HOSPITAL ENCOUNTER (OUTPATIENT)
Dept: MRI IMAGING | Facility: HOSPITAL | Age: 53
Discharge: HOME | End: 2023-01-10
Admitting: STUDENT IN AN ORGANIZED HEALTH CARE EDUCATION/TRAINING PROGRAM
Payer: OTHER GOVERNMENT

## 2023-01-10 ENCOUNTER — OFFICE VISIT (OUTPATIENT)
Dept: ONCOLOGY | Facility: CLINIC | Age: 53
End: 2023-01-10
Payer: OTHER GOVERNMENT

## 2023-01-10 ENCOUNTER — HOSPITAL ENCOUNTER (OUTPATIENT)
Dept: RADIATION ONCOLOGY | Facility: HOSPITAL | Age: 53
Setting detail: RADIATION/ONCOLOGY SERIES
Discharge: HOME | End: 2023-01-10
Payer: OTHER GOVERNMENT

## 2023-01-10 ENCOUNTER — SPECIALTY PHARMACY (OUTPATIENT)
Dept: ONCOLOGY | Facility: HOSPITAL | Age: 53
End: 2023-01-10
Payer: OTHER GOVERNMENT

## 2023-01-10 ENCOUNTER — DOCUMENTATION (OUTPATIENT)
Dept: NEUROSURGERY | Facility: CLINIC | Age: 53
End: 2023-01-10
Payer: OTHER GOVERNMENT

## 2023-01-10 VITALS
TEMPERATURE: 97.3 F | HEIGHT: 72 IN | WEIGHT: 219 LBS | HEART RATE: 71 BPM | OXYGEN SATURATION: 98 % | DIASTOLIC BLOOD PRESSURE: 96 MMHG | RESPIRATION RATE: 18 BRPM | SYSTOLIC BLOOD PRESSURE: 174 MMHG | BODY MASS INDEX: 29.66 KG/M2

## 2023-01-10 DIAGNOSIS — C71.9 GLIOBLASTOMA: ICD-10-CM

## 2023-01-10 DIAGNOSIS — C71.9 GLIOBLASTOMA: Primary | ICD-10-CM

## 2023-01-10 LAB
ALBUMIN SERPL-MCNC: 4.4 G/DL (ref 3.5–5.2)
ALBUMIN/GLOB SERPL: 1.8 G/DL
ALP SERPL-CCNC: 73 U/L (ref 39–117)
ALT SERPL W P-5'-P-CCNC: 17 U/L (ref 1–41)
ANION GAP SERPL CALCULATED.3IONS-SCNC: 12 MMOL/L (ref 5–15)
AST SERPL-CCNC: 17 U/L (ref 1–40)
BASOPHILS # BLD AUTO: 0.05 10*3/MM3 (ref 0–0.2)
BASOPHILS NFR BLD AUTO: 0.7 % (ref 0–1.5)
BILIRUB SERPL-MCNC: 1 MG/DL (ref 0–1.2)
BUN SERPL-MCNC: 13 MG/DL (ref 6–20)
BUN/CREAT SERPL: 14 (ref 7–25)
CALCIUM SPEC-SCNC: 9.7 MG/DL (ref 8.6–10.5)
CHLORIDE SERPL-SCNC: 100 MMOL/L (ref 98–107)
CO2 SERPL-SCNC: 28 MMOL/L (ref 22–29)
CREAT SERPL-MCNC: 0.93 MG/DL (ref 0.76–1.27)
DEPRECATED RDW RBC AUTO: 38.7 FL (ref 37–54)
EGFRCR SERPLBLD CKD-EPI 2021: 97.6 ML/MIN/1.73
EOSINOPHIL # BLD AUTO: 0.16 10*3/MM3 (ref 0–0.4)
EOSINOPHIL NFR BLD AUTO: 2.4 % (ref 0.3–6.2)
ERYTHROCYTE [DISTWIDTH] IN BLOOD BY AUTOMATED COUNT: 11.7 % (ref 12.3–15.4)
GLOBULIN UR ELPH-MCNC: 2.5 GM/DL
GLUCOSE SERPL-MCNC: 227 MG/DL (ref 65–99)
HCT VFR BLD AUTO: 41.7 % (ref 37.5–51)
HGB BLD-MCNC: 14.5 G/DL (ref 13–17.7)
IMM GRANULOCYTES # BLD AUTO: 0.02 10*3/MM3 (ref 0–0.05)
IMM GRANULOCYTES NFR BLD AUTO: 0.3 % (ref 0–0.5)
LYMPHOCYTES # BLD AUTO: 2.13 10*3/MM3 (ref 0.7–3.1)
LYMPHOCYTES NFR BLD AUTO: 31.6 % (ref 19.6–45.3)
MCH RBC QN AUTO: 31 PG (ref 26.6–33)
MCHC RBC AUTO-ENTMCNC: 34.8 G/DL (ref 31.5–35.7)
MCV RBC AUTO: 89.3 FL (ref 79–97)
MONOCYTES # BLD AUTO: 0.41 10*3/MM3 (ref 0.1–0.9)
MONOCYTES NFR BLD AUTO: 6.1 % (ref 5–12)
NEUTROPHILS NFR BLD AUTO: 3.98 10*3/MM3 (ref 1.7–7)
NEUTROPHILS NFR BLD AUTO: 58.9 % (ref 42.7–76)
PLATELET # BLD AUTO: 228 10*3/MM3 (ref 140–450)
PMV BLD AUTO: 9.3 FL (ref 6–12)
POTASSIUM SERPL-SCNC: 4.4 MMOL/L (ref 3.5–5.2)
PROT SERPL-MCNC: 6.9 G/DL (ref 6–8.5)
RBC # BLD AUTO: 4.67 10*6/MM3 (ref 4.14–5.8)
SODIUM SERPL-SCNC: 140 MMOL/L (ref 136–145)
WBC NRBC COR # BLD: 6.75 10*3/MM3 (ref 3.4–10.8)

## 2023-01-10 PROCEDURE — 85025 COMPLETE CBC W/AUTO DIFF WBC: CPT

## 2023-01-10 PROCEDURE — 70553 MRI BRAIN STEM W/O & W/DYE: CPT

## 2023-01-10 PROCEDURE — 0 GADOBENATE DIMEGLUMINE 529 MG/ML SOLUTION: Performed by: STUDENT IN AN ORGANIZED HEALTH CARE EDUCATION/TRAINING PROGRAM

## 2023-01-10 PROCEDURE — A9577 INJ MULTIHANCE: HCPCS | Performed by: STUDENT IN AN ORGANIZED HEALTH CARE EDUCATION/TRAINING PROGRAM

## 2023-01-10 PROCEDURE — 36415 COLL VENOUS BLD VENIPUNCTURE: CPT

## 2023-01-10 PROCEDURE — 99214 OFFICE O/P EST MOD 30 MIN: CPT | Performed by: INTERNAL MEDICINE

## 2023-01-10 PROCEDURE — 80053 COMPREHEN METABOLIC PANEL: CPT

## 2023-01-10 RX ORDER — AMLODIPINE BESYLATE 5 MG/1
5 TABLET ORAL DAILY
COMMUNITY
Start: 2023-01-09 | End: 2023-02-21 | Stop reason: HOSPADM

## 2023-01-10 RX ORDER — ONDANSETRON HYDROCHLORIDE 8 MG/1
8 TABLET, FILM COATED ORAL 3 TIMES DAILY PRN
Qty: 30 TABLET | Refills: 5 | Status: SHIPPED | OUTPATIENT
Start: 2023-01-10

## 2023-01-10 RX ORDER — TEMOZOLOMIDE 140 MG/1
140 CAPSULE ORAL DAILY
Qty: 5 CAPSULE | Refills: 11 | Status: SHIPPED | OUTPATIENT
Start: 2023-01-16 | End: 2023-02-15 | Stop reason: SDUPTHER

## 2023-01-10 RX ORDER — TEMOZOLOMIDE 180 MG/1
180 CAPSULE ORAL DAILY
Qty: 5 CAPSULE | Refills: 11 | Status: SHIPPED | OUTPATIENT
Start: 2023-01-16 | End: 2023-02-15 | Stop reason: SDUPTHER

## 2023-01-10 RX ORDER — SULFAMETHOXAZOLE AND TRIMETHOPRIM 800; 160 MG/1; MG/1
1 TABLET ORAL 3 TIMES WEEKLY
Qty: 12 TABLET | Refills: 7 | Status: SHIPPED | OUTPATIENT
Start: 2023-01-11 | End: 2023-02-13

## 2023-01-10 RX ADMIN — GADOBENATE DIMEGLUMINE 19 ML: 529 INJECTION, SOLUTION INTRAVENOUS at 07:43

## 2023-01-10 NOTE — PROGRESS NOTES
DATE OF VISIT: 1/10/2023    REASON FOR VISIT: Followup for left frontal lobe GBM     PROBLEM LIST:  1.  Left frontal lobe GBM:  A.  Presented with headaches and slurred speech  B.  Diagnosed after craniotomy done by Dr. Beach September 20, 2022  C.  Started definitive concurrent chemotherapy with radiation using Temodar October 2022.  D.  Started maintenance Temodar January 17 , 2022.  Status post 1 cycle.  2.  Hemotherapy induced nausea  3.  Hypertension  4.  Seizure disorder    HISTORY OF PRESENT ILLNESS: The patient is a very pleasant 52 y.o. male  with past medical history significant for GBM diagnosed September 20, 2022. The  patient is here today for scheduled follow-up visit.    SUBJECTIVE: Reinaldo is here today with his .  All in all is doing fairly well.  He is having mild sinus pressure.  No headaches.  He is using naproxen less than twice a week.  He is anxious about the scan results.    Past History:  Medical History: has a past medical history of Diverticulitis, High triglycerides, Hyperlipidemia, Hypertension, Skin cancer, and Sleep apnea.   Surgical History: has a past surgical history that includes Eye surgery (Bilateral, 2008); Skin surgery (2010); Colonoscopy; Craniotomy for Tumor (N/A, 09/21/2022); and Craniotomy (08/21/2022).   Family History: family history includes Cancer in his mother; Heart attack in his mother; Hyperlipidemia in his father; Hypertension in his brother; Melanoma in his mother; Stroke in his father.   Social History: reports that he has quit smoking. His smoking use included cigarettes. His smokeless tobacco use includes snuff. He reports that he does not currently use alcohol. He reports that he does not use drugs.    (Not in a hospital admission)     Allergies: Patient has no known allergies.     Review of Systems   Constitutional: Positive for fatigue.   Neurological: Positive for headaches.   Psychiatric/Behavioral: The patient is nervous/anxious.        PHYSICAL  EXAMINATION:   /96   Pulse 71   Temp 97.3 °F (36.3 °C) (Infrared)   Resp 18   Ht 182.9 cm (72.01\")   Wt 99.3 kg (219 lb)   SpO2 98%   BMI 29.70 kg/m²    Pain Score    01/10/23 1031   PainSc: 0-No pain        ECOG score: 0        ECOG Performance Status: 1 - Symptomatic but completely ambulatory      General Appearance:      alert, cooperative, no apparent distress and appears stated age   Lungs:   Clear to auscultation bilaterally; respirations regular, even, and unlabored bilaterally   Heart:  Regular rate and rhythm, no murmurs appreciated   Abdomen:   Soft, non-tender, non-distended and no organomegaly                 Lab on 01/10/2023   Component Date Value Ref Range Status   • WBC 01/10/2023 6.75  3.40 - 10.80 10*3/mm3 Final   • RBC 01/10/2023 4.67  4.14 - 5.80 10*6/mm3 Final   • Hemoglobin 01/10/2023 14.5  13.0 - 17.7 g/dL Final   • Hematocrit 01/10/2023 41.7  37.5 - 51.0 % Final   • MCV 01/10/2023 89.3  79.0 - 97.0 fL Final   • MCH 01/10/2023 31.0  26.6 - 33.0 pg Final   • MCHC 01/10/2023 34.8  31.5 - 35.7 g/dL Final   • RDW 01/10/2023 11.7 (L)  12.3 - 15.4 % Final   • RDW-SD 01/10/2023 38.7  37.0 - 54.0 fl Final   • MPV 01/10/2023 9.3  6.0 - 12.0 fL Final   • Platelets 01/10/2023 228  140 - 450 10*3/mm3 Final   • Neutrophil % 01/10/2023 58.9  42.7 - 76.0 % Final   • Lymphocyte % 01/10/2023 31.6  19.6 - 45.3 % Final   • Monocyte % 01/10/2023 6.1  5.0 - 12.0 % Final   • Eosinophil % 01/10/2023 2.4  0.3 - 6.2 % Final   • Basophil % 01/10/2023 0.7  0.0 - 1.5 % Final   • Immature Grans % 01/10/2023 0.3  0.0 - 0.5 % Final   • Neutrophils, Absolute 01/10/2023 3.98  1.70 - 7.00 10*3/mm3 Final   • Lymphocytes, Absolute 01/10/2023 2.13  0.70 - 3.10 10*3/mm3 Final   • Monocytes, Absolute 01/10/2023 0.41  0.10 - 0.90 10*3/mm3 Final   • Eosinophils, Absolute 01/10/2023 0.16  0.00 - 0.40 10*3/mm3 Final   • Basophils, Absolute 01/10/2023 0.05  0.00 - 0.20 10*3/mm3 Final   • Immature Grans, Absolute 01/10/2023  0.02  0.00 - 0.05 10*3/mm3 Final        MRI Brain With & Without Contrast    Result Date: 1/10/2023  Narrative: PROCEDURE: MRI BRAIN W WO CONTRAST-  HISTORY: glioblastoma, vision change; C71.9-Malignant neoplasm of brain, unspecified  COMPARISON: 09/22/2022 MRI.  FINDINGS: Multiplanar MR imaging of the head was performed  without and with contrast.  Postoperative changes are again seen from left frontal craniotomy. There has been an interval decrease in the amount of left frontal extra-axial fluid. Postoperative changes are again seen in the left frontal lobe. There has been a significant interval increase in the amount of contrast enhancement involving the left frontal lobe and extending into the anterior aspect of the corpus callosum. There has been interval enlargement of the corpus callosum and the contrast enhancement extends into the medial aspect of the right frontal lobe. The overall area of contrast enhancement measures approximately 6.5 x 4.7 cm in maximum axial dimension. There is lack of contrast enhancement centrally that may represent central necrosis. There is new moderate edema in the right frontal white matter, and worsening edema in the anterior left frontal lobe. There has been interval improvement in the edema in the left posterior frontal region.      Impression: Postoperative changes left frontal lobe with partial improvement in left frontal extra-axial fluid.  Marked interval worsening of contrast enhancement and surrounding edema involving the bilateral frontal lobes left greater than right. This is most worrisome for worsening neoplastic involvement.  This report was signed and finalized on 1/10/2023 8:20 AM by Awais Hurtado MD.      ASSESSMENT: The patient is a very pleasant 52 y.o. male  with left frontal lobe GBM      PLAN:    1.  Left frontal lobe GBM:  A.  I did go over the MRI brain result with the patient.  I reviewed the films myself.  Compared the current films from January 10,  2023 the previous MRI done September 2023.  He does have improvement in the left frontal lobe mass however there is increased contrast enhancement in the left frontal lobe as well as new right frontal lobe.  While both of these changes could be related is progression of the possibility is postradiation changes.  B.  At this time I will start the patient on Temodar maintenance 150 mg per metered squared day 1 through day 5 every 28 days.  C.  I will repeat his MRI brain in 3 months which will be due mid April 2023.  D.  I will monitor the patient blood work including blood counts kidney function liver function and electrolytes.  E.  I will start on Bactrim prophylaxis once a day Monday Wednesday Friday.    2.  Brain edema:  A.  He is off steroids completely.    3.  Seizure prophylaxis:  A.  The patient has weaned off Keppra completely.    4.  Hypertension:  A.  I will continue lisinopril 20 mg daily. He has been taken off his Norvasc by his PCP secondary to low blood pressure.     FOLLOW UP: 5 weeks with CBC and CMP.    Shaan Rader MD  1/10/2023

## 2023-01-10 NOTE — PROGRESS NOTES
Re: Refills of Oral Specialty Medication - Temodar (temozolomide)    • Drug-Drug Interactions: The current medication list was reviewed and there are no relevant drug-drug interactions.  • Medication Allergies: The patient has NKDA  • Review of Labs/Dose Adjustments: Labs WNL    Patient has completed treatment with temozolomide + RT. He is now starting maintenance treatment with temozolomide for treatment of a GBM.    Temozolomide 150mg/m2 x 2.21 = 331.5 mg - rounded down to 320mg due to capsule sizes.    Patient will continue to take Bactrim.  He will take Zofran 30 minutes prior to temozolomide doses. I reviewed this and the new directions with the patient.    I will discuss with Dr. Rader about increasing the dose with cycle 2.     Drug: Temodar (temozolomide)  Strength: 140 mg  Directions: Take 1 capsule by mouth with 1 other temozolomide prescription for 320mg total daily. Take a total of 320mg daily (1 capsule of 140mg and 1 capsule of 180mg) on days 1-5 of each 28-day cycle.  Quantity: 5  Refills: 11  Pharmacy prescription sent to: [facility]    Drug: Temodar (temozolomide)  Strength: 180 mg  Directions: Take 1 capsule by mouth with 1 other temozolomide prescription for 320mg total daily. Take a total of 320mg daily (1 capsule of 140mg and 1 capsule of 180mg) on days 1-5 of each 28-day cycle.  Quantity: 5  Refills: 11  Pharmacy prescription sent to: [facility]    Tuan OrtezD, [facility]  Oncology Clinical Pharmacist  1/10/2023  15:17 EST

## 2023-01-10 NOTE — PROGRESS NOTES
I talked to the patient and his family over the phone regarding the results of his new MRI.  There is a dramatic increase in flair signal change and enhancement in the bilateral frontal lobes.  There is no mass-effect or shift from these changes.  Additionally, the patient is doing well clinically and has not had any new symptoms.  As such, I would favor these changes to be treatment related rather than tumor recurrence, but I did explain to the patient there is no way to know for certain at this time.  I do not see a role for any type of intervention from a neurosurgical perspective.  I am going to have the patient follow-up with me in 2 months with a new brain MRI.  I did instruct the patient and his family to call if he is developing any new or worsening issues.

## 2023-01-11 ENCOUNTER — SPECIALTY PHARMACY (OUTPATIENT)
Dept: PHARMACY | Facility: HOSPITAL | Age: 53
End: 2023-01-11
Payer: OTHER GOVERNMENT

## 2023-01-11 NOTE — PROGRESS NOTES
Specialty Pharmacy Note - Double Check    Temozolomide 150 mg/m2 x 2.21 = 331.5 mg (Rounded to 320mg due to capsule size)    Drug: Temozolomide  Strength: 140 mg  Directions: Take 1 capsule by mouth daily with one 180 mg capsule for a total of 320 mg daily on days 1-5 of a 28 day cycle  QTY: 5  RF:11    Drug: Temozolomide  Strength: 180 mg  Directions: Take 1 capsule by mouth daily with one 140 mg capsule for a total of 320 mg daily on days 1-5 of a 28 day cycle  QTY: 5  RF:11    Released to pharmacy: [facility]    Completed independent double check on medication order/RX.

## 2023-02-09 ENCOUNTER — HOSPITAL ENCOUNTER (EMERGENCY)
Facility: HOSPITAL | Age: 53
Discharge: HOME | End: 2023-02-09
Attending: EMERGENCY MEDICINE | Admitting: EMERGENCY MEDICINE
Payer: OTHER GOVERNMENT

## 2023-02-09 ENCOUNTER — APPOINTMENT (OUTPATIENT)
Dept: CT IMAGING | Facility: HOSPITAL | Age: 53
End: 2023-02-09
Payer: OTHER GOVERNMENT

## 2023-02-09 VITALS
HEIGHT: 72 IN | HEART RATE: 70 BPM | RESPIRATION RATE: 18 BRPM | SYSTOLIC BLOOD PRESSURE: 132 MMHG | BODY MASS INDEX: 29.12 KG/M2 | OXYGEN SATURATION: 95 % | TEMPERATURE: 98.2 F | WEIGHT: 215 LBS | DIASTOLIC BLOOD PRESSURE: 94 MMHG

## 2023-02-09 DIAGNOSIS — C71.9 GLIOBLASTOMA: Primary | ICD-10-CM

## 2023-02-09 LAB
ALBUMIN SERPL-MCNC: 4.2 G/DL (ref 3.5–5.2)
ALBUMIN/GLOB SERPL: 1.7 G/DL
ALP SERPL-CCNC: 82 U/L (ref 39–117)
ALT SERPL W P-5'-P-CCNC: 17 U/L (ref 1–41)
ANION GAP SERPL CALCULATED.3IONS-SCNC: 9.6 MMOL/L (ref 5–15)
AST SERPL-CCNC: 16 U/L (ref 1–40)
BASOPHILS # BLD AUTO: 0.05 10*3/MM3 (ref 0–0.2)
BASOPHILS NFR BLD AUTO: 0.6 % (ref 0–1.5)
BILIRUB SERPL-MCNC: 1.4 MG/DL (ref 0–1.2)
BUN SERPL-MCNC: 21 MG/DL (ref 6–20)
BUN/CREAT SERPL: 21 (ref 7–25)
CALCIUM SPEC-SCNC: 9.4 MG/DL (ref 8.6–10.5)
CHLORIDE SERPL-SCNC: 101 MMOL/L (ref 98–107)
CO2 SERPL-SCNC: 28.4 MMOL/L (ref 22–29)
CREAT SERPL-MCNC: 1 MG/DL (ref 0.76–1.27)
CRP SERPL-MCNC: 0.61 MG/DL (ref 0–0.5)
DEPRECATED RDW RBC AUTO: 39.2 FL (ref 37–54)
EGFRCR SERPLBLD CKD-EPI 2021: 89.4 ML/MIN/1.73
EOSINOPHIL # BLD AUTO: 0.14 10*3/MM3 (ref 0–0.4)
EOSINOPHIL NFR BLD AUTO: 1.8 % (ref 0.3–6.2)
ERYTHROCYTE [DISTWIDTH] IN BLOOD BY AUTOMATED COUNT: 12.3 % (ref 12.3–15.4)
ERYTHROCYTE [SEDIMENTATION RATE] IN BLOOD: 4 MM/HR (ref 0–20)
GLOBULIN UR ELPH-MCNC: 2.5 GM/DL
GLUCOSE SERPL-MCNC: 108 MG/DL (ref 65–99)
HCT VFR BLD AUTO: 42.5 % (ref 37.5–51)
HGB BLD-MCNC: 14.6 G/DL (ref 13–17.7)
IMM GRANULOCYTES # BLD AUTO: 0.03 10*3/MM3 (ref 0–0.05)
IMM GRANULOCYTES NFR BLD AUTO: 0.4 % (ref 0–0.5)
LYMPHOCYTES # BLD AUTO: 1.89 10*3/MM3 (ref 0.7–3.1)
LYMPHOCYTES NFR BLD AUTO: 24.2 % (ref 19.6–45.3)
MCH RBC QN AUTO: 29.9 PG (ref 26.6–33)
MCHC RBC AUTO-ENTMCNC: 34.4 G/DL (ref 31.5–35.7)
MCV RBC AUTO: 87.1 FL (ref 79–97)
MONOCYTES # BLD AUTO: 0.79 10*3/MM3 (ref 0.1–0.9)
MONOCYTES NFR BLD AUTO: 10.1 % (ref 5–12)
NEUTROPHILS NFR BLD AUTO: 4.91 10*3/MM3 (ref 1.7–7)
NEUTROPHILS NFR BLD AUTO: 62.9 % (ref 42.7–76)
NRBC BLD AUTO-RTO: 0 /100 WBC (ref 0–0.2)
PLATELET # BLD AUTO: 254 10*3/MM3 (ref 140–450)
PMV BLD AUTO: 9.6 FL (ref 6–12)
POTASSIUM SERPL-SCNC: 4.4 MMOL/L (ref 3.5–5.2)
PROT SERPL-MCNC: 6.7 G/DL (ref 6–8.5)
RBC # BLD AUTO: 4.88 10*6/MM3 (ref 4.14–5.8)
SODIUM SERPL-SCNC: 139 MMOL/L (ref 136–145)
WBC NRBC COR # BLD: 7.81 10*3/MM3 (ref 3.4–10.8)

## 2023-02-09 PROCEDURE — 80053 COMPREHEN METABOLIC PANEL: CPT | Performed by: EMERGENCY MEDICINE

## 2023-02-09 PROCEDURE — 86140 C-REACTIVE PROTEIN: CPT | Performed by: EMERGENCY MEDICINE

## 2023-02-09 PROCEDURE — 99283 EMERGENCY DEPT VISIT LOW MDM: CPT

## 2023-02-09 PROCEDURE — 25010000002 IOPAMIDOL 61 % SOLUTION: Performed by: EMERGENCY MEDICINE

## 2023-02-09 PROCEDURE — 25010000002 ONDANSETRON PER 1 MG: Performed by: EMERGENCY MEDICINE

## 2023-02-09 PROCEDURE — 85652 RBC SED RATE AUTOMATED: CPT | Performed by: EMERGENCY MEDICINE

## 2023-02-09 PROCEDURE — 70470 CT HEAD/BRAIN W/O & W/DYE: CPT

## 2023-02-09 PROCEDURE — 96374 THER/PROPH/DIAG INJ IV PUSH: CPT

## 2023-02-09 PROCEDURE — 25010000002 FENTANYL CITRATE (PF) 50 MCG/ML SOLUTION: Performed by: EMERGENCY MEDICINE

## 2023-02-09 PROCEDURE — 96375 TX/PRO/DX INJ NEW DRUG ADDON: CPT

## 2023-02-09 PROCEDURE — 85025 COMPLETE CBC W/AUTO DIFF WBC: CPT | Performed by: EMERGENCY MEDICINE

## 2023-02-09 PROCEDURE — 25010000002 MORPHINE PER 10 MG: Performed by: EMERGENCY MEDICINE

## 2023-02-09 RX ORDER — BUTALBITAL, ACETAMINOPHEN AND CAFFEINE 50; 325; 40 MG/1; MG/1; MG/1
1 TABLET ORAL EVERY 6 HOURS PRN
Qty: 12 TABLET | Refills: 0 | Status: SHIPPED | OUTPATIENT
Start: 2023-02-09

## 2023-02-09 RX ORDER — SODIUM CHLORIDE 0.9 % (FLUSH) 0.9 %
10 SYRINGE (ML) INJECTION AS NEEDED
Status: DISCONTINUED | OUTPATIENT
Start: 2023-02-09 | End: 2023-02-10 | Stop reason: HOSPADM

## 2023-02-09 RX ORDER — ONDANSETRON 2 MG/ML
4 INJECTION INTRAMUSCULAR; INTRAVENOUS ONCE
Status: COMPLETED | OUTPATIENT
Start: 2023-02-09 | End: 2023-02-09

## 2023-02-09 RX ORDER — HYDROCODONE BITARTRATE AND ACETAMINOPHEN 7.5; 325 MG/1; MG/1
1 TABLET ORAL EVERY 6 HOURS PRN
Qty: 12 TABLET | Refills: 0 | Status: SHIPPED | OUTPATIENT
Start: 2023-02-09 | End: 2023-02-13 | Stop reason: DRUGHIGH

## 2023-02-09 RX ORDER — FENTANYL CITRATE 50 UG/ML
25 INJECTION, SOLUTION INTRAMUSCULAR; INTRAVENOUS
Status: DISCONTINUED | OUTPATIENT
Start: 2023-02-09 | End: 2023-02-10 | Stop reason: HOSPADM

## 2023-02-09 RX ADMIN — SODIUM CHLORIDE 500 ML: 9 INJECTION, SOLUTION INTRAVENOUS at 17:09

## 2023-02-09 RX ADMIN — IOPAMIDOL 100 ML: 612 INJECTION, SOLUTION INTRAVENOUS at 18:13

## 2023-02-09 RX ADMIN — MORPHINE SULFATE 4 MG: 4 INJECTION, SOLUTION INTRAMUSCULAR; INTRAVENOUS at 21:33

## 2023-02-09 RX ADMIN — FENTANYL CITRATE 25 MCG: 50 INJECTION, SOLUTION INTRAMUSCULAR; INTRAVENOUS at 17:13

## 2023-02-09 RX ADMIN — ONDANSETRON 4 MG: 2 INJECTION INTRAMUSCULAR; INTRAVENOUS at 17:11

## 2023-02-09 NOTE — ED PROVIDER NOTES
Subjective   History of Present Illness  This is a 52-year-old male with complaint of headache x3 days.  Patient has history of left frontal glioblastoma.  Did have a craniotomy performed on September 20, 2022.  Patient has been treated with chemotherapy and radiation.  Patient has been on Temodar maintenance.patient reports having worse than usual headaches over the past 3 days.  Describes the pain as sharp thunderclap headaches.  Patient reports history of seizure disorder hypertension.    History provided by:  Patient   used: No    Headache  Pain location:  Generalized  Quality:  Sharp  Radiates to:  Does not radiate  Severity at highest:  8/10  Onset quality:  Gradual  Duration:  2 days  Timing:  Intermittent  Progression:  Worsening  Chronicity:  New  Similar to prior headaches: no    Context: not activity, not exposure to bright light, not defecating, not eating, not stress, not exposure to cold air, not intercourse, not loud noise and not straining    Relieved by:  Nothing  Worsened by:  Nothing  Ineffective treatments:  None tried  Associated symptoms: no abdominal pain, no back pain, no blurred vision, no congestion, no cough, no diarrhea, no dizziness, no drainage, no eye pain, no facial pain, no fatigue, no fever, no focal weakness, no hearing loss, no neck pain, no neck stiffness, no numbness, no paresthesias, no photophobia, no seizures, no swollen glands, no syncope, no tingling, no URI and no visual change    Risk factors: no anger, no family hx of SAH, does not have insomnia and lifestyle not sedentary        Review of Systems   Constitutional: Positive for chills. Negative for fatigue and fever.   HENT: Negative for congestion, hearing loss and postnasal drip.    Eyes: Negative.  Negative for blurred vision, photophobia, pain, redness and itching.   Respiratory: Negative.  Negative for cough, choking and chest tightness.    Cardiovascular: Negative.  Negative for chest pain, leg  swelling and syncope.   Gastrointestinal: Negative.  Negative for abdominal pain, anal bleeding, blood in stool and diarrhea.   Endocrine: Negative.  Negative for heat intolerance, polydipsia and polyphagia.   Genitourinary: Negative.  Negative for enuresis, frequency, genital sores, hematuria, penile discharge, penile pain and penile swelling.   Musculoskeletal: Negative.  Negative for back pain, neck pain and neck stiffness.   Skin: Negative for color change, pallor and rash.   Neurological: Positive for headaches. Negative for dizziness, focal weakness, seizures, numbness and paresthesias.   Hematological: Negative.    Psychiatric/Behavioral: Negative.  Negative for behavioral problems, decreased concentration, dysphoric mood and hallucinations. The patient is not hyperactive.    All other systems reviewed and are negative.      Past Medical History:   Diagnosis Date   • Diverticulitis    • High triglycerides    • Hyperlipidemia    • Hypertension    • Skin cancer    • Sleep apnea        No Known Allergies    Past Surgical History:   Procedure Laterality Date   • COLONOSCOPY     • CRANIOTOMY  08/21/2022   • CRANIOTOMY FOR TUMOR N/A 09/21/2022    Procedure: CRANIOTOMY FOR TUMOR;  Surgeon: Umair Patton MD;  Location: [facility];  Service: Neurosurgery;  Laterality: N/A;   • EYE SURGERY Bilateral 2008    PRK   • SKIN SURGERY  2010    removed abnormal cells       Family History   Problem Relation Age of Onset   • Cancer Mother    • Heart attack Mother    • Melanoma Mother    • Hyperlipidemia Father    • Stroke Father    • Hypertension Brother    • Colon cancer Neg Hx        Social History     Socioeconomic History   • Marital status:    Tobacco Use   • Smoking status: Former     Packs/day: 0.00     Years: 10.00     Pack years: 0.00     Types: Cigarettes   • Smokeless tobacco: Current     Types: Snuff   Vaping Use   • Vaping Use: Never used   Substance and Sexual Activity   • Alcohol use: Not Currently      Comment: Socially   • Drug use: Never   • Sexual activity: Yes     Partners: Male     Birth control/protection: Same-sex partner           Objective   Physical Exam  Vitals and nursing note reviewed.   Constitutional:       General: He is not in acute distress.     Appearance: Normal appearance. He is normal weight. He is not ill-appearing, toxic-appearing or diaphoretic.   HENT:      Head: Normocephalic and atraumatic.      Right Ear: Tympanic membrane, ear canal and external ear normal. There is no impacted cerumen.      Left Ear: Tympanic membrane, ear canal and external ear normal. There is no impacted cerumen.      Nose: Nose normal. No congestion or rhinorrhea.      Mouth/Throat:      Mouth: Mucous membranes are moist.      Pharynx: Oropharynx is clear. No oropharyngeal exudate or posterior oropharyngeal erythema.   Eyes:      General: No scleral icterus.        Right eye: No discharge.         Left eye: No discharge.      Extraocular Movements: Extraocular movements intact.      Conjunctiva/sclera: Conjunctivae normal.      Pupils: Pupils are equal, round, and reactive to light.   Cardiovascular:      Rate and Rhythm: Normal rate and regular rhythm.      Pulses: Normal pulses.      Heart sounds: Normal heart sounds. No murmur heard.    No friction rub. No gallop.   Pulmonary:      Effort: Pulmonary effort is normal. No respiratory distress.      Breath sounds: Normal breath sounds. No stridor. No wheezing, rhonchi or rales.   Chest:      Chest wall: No tenderness.   Abdominal:      General: Abdomen is flat. Bowel sounds are normal. There is no distension.      Palpations: Abdomen is soft.      Tenderness: There is no abdominal tenderness. There is no right CVA tenderness, left CVA tenderness, guarding or rebound.      Hernia: No hernia is present.   Musculoskeletal:         General: No swelling, tenderness, deformity or signs of injury. Normal range of motion.      Cervical back: Normal range of motion and  neck supple. No rigidity or tenderness.      Right lower leg: No edema.      Left lower leg: No edema.   Lymphadenopathy:      Cervical: No cervical adenopathy.   Skin:     General: Skin is warm and dry.      Capillary Refill: Capillary refill takes less than 2 seconds.      Coloration: Skin is not jaundiced or pale.      Findings: No bruising, erythema, lesion or rash.   Neurological:      General: No focal deficit present.      Mental Status: He is alert and oriented to person, place, and time. Mental status is at baseline.      Cranial Nerves: No cranial nerve deficit.      Sensory: No sensory deficit.      Motor: No weakness.      Coordination: Coordination normal.      Gait: Gait normal.   Psychiatric:         Mood and Affect: Mood normal.         Behavior: Behavior normal.         Thought Content: Thought content normal.         Judgment: Judgment normal.         Procedures           ED Course  ED Course as of 02/13/23 0758   u Feb 09, 2023 1957 Findings are most compatible with malignant brain tumor, most likely glioblastoma multiforme. []   1959 Call placed to Neurosurgery.  []   2005 Endorsed to Dr. Shankar.  []      ED Course User Index  [] Michele Lanier PA-C                                           [facility]    Final diagnoses:   Glioblastoma (HCC)       ED Disposition  ED Disposition     ED Disposition   Discharge    Condition   Stable    Comment   --             Alicia Devlin PA  [address] [facility]  [address], [place] [id number] [phone]               Medication List      New Prescriptions    butalbital-acetaminophen-caffeine -40 MG per tablet  Commonly known as: FIORICET, ESGIC  Take 1 tablet by mouth Every 6 (Six) Hours As Needed for Headache.     HYDROcodone-acetaminophen 7.5-325 MG per tablet  Commonly known as: NORCO  Take 1 tablet by mouth Every 6 (Six) Hours As Needed for Moderate Pain.           Where to Get Your Medications      These medications were sent to  [facility] [place], [place] - [address] - [phone]  - [phone] FX  [address], [facility] [address]    Phone: [phone]   · butalbital-acetaminophen-caffeine -40 MG per tablet  · HYDROcodone-acetaminophen 7.5-325 MG per tablet          Michele Lanier PA-C  02/13/23 0758

## 2023-02-09 NOTE — TELEPHONE ENCOUNTER
Aron patient's emergency contact left a voicemail patient is having headaches off and on, but seem more intense today. He wonders if he needs to be seen? Please call.

## 2023-02-09 NOTE — TELEPHONE ENCOUNTER
I returned Aron's phone call and he advised they are already on the way to the ER.  He said his headaches have been very intense today.  I told him to update us tomorrow on what they say, but that he also needs to let his nuerosurgeon know as well as reviewing their note they indicated for him to call with worsening symptoms as well. He verbalized understanding.

## 2023-02-13 ENCOUNTER — TELEPHONE (OUTPATIENT)
Dept: ONCOLOGY | Facility: CLINIC | Age: 53
End: 2023-02-13

## 2023-02-13 ENCOUNTER — OFFICE VISIT (OUTPATIENT)
Dept: ONCOLOGY | Facility: CLINIC | Age: 53
End: 2023-02-13
Payer: OTHER GOVERNMENT

## 2023-02-13 ENCOUNTER — LAB (OUTPATIENT)
Dept: LAB | Facility: HOSPITAL | Age: 53
End: 2023-02-13
Payer: OTHER GOVERNMENT

## 2023-02-13 ENCOUNTER — OFFICE VISIT (OUTPATIENT)
Dept: NEUROSURGERY | Facility: CLINIC | Age: 53
End: 2023-02-13
Payer: OTHER GOVERNMENT

## 2023-02-13 VITALS
BODY MASS INDEX: 27.09 KG/M2 | WEIGHT: 200 LBS | HEIGHT: 72 IN | SYSTOLIC BLOOD PRESSURE: 118 MMHG | TEMPERATURE: 97.3 F | DIASTOLIC BLOOD PRESSURE: 74 MMHG

## 2023-02-13 VITALS
OXYGEN SATURATION: 95 % | TEMPERATURE: 97.1 F | RESPIRATION RATE: 18 BRPM | DIASTOLIC BLOOD PRESSURE: 80 MMHG | HEIGHT: 72 IN | BODY MASS INDEX: 27.22 KG/M2 | HEART RATE: 93 BPM | WEIGHT: 201 LBS | SYSTOLIC BLOOD PRESSURE: 118 MMHG

## 2023-02-13 DIAGNOSIS — C71.9 GLIOBLASTOMA: ICD-10-CM

## 2023-02-13 DIAGNOSIS — C71.9 GLIOBLASTOMA: Primary | ICD-10-CM

## 2023-02-13 LAB
ALBUMIN SERPL-MCNC: 5 G/DL (ref 3.5–5.2)
ALBUMIN/GLOB SERPL: 1.8 G/DL
ALP SERPL-CCNC: 97 U/L (ref 39–117)
ALT SERPL W P-5'-P-CCNC: 14 U/L (ref 1–41)
ANION GAP SERPL CALCULATED.3IONS-SCNC: 13 MMOL/L (ref 5–15)
AST SERPL-CCNC: 15 U/L (ref 1–40)
BASOPHILS # BLD AUTO: 0.04 10*3/MM3 (ref 0–0.2)
BASOPHILS NFR BLD AUTO: 0.4 % (ref 0–1.5)
BILIRUB SERPL-MCNC: 1.8 MG/DL (ref 0–1.2)
BUN SERPL-MCNC: 19 MG/DL (ref 6–20)
BUN/CREAT SERPL: 20 (ref 7–25)
CALCIUM SPEC-SCNC: 9.7 MG/DL (ref 8.6–10.5)
CHLORIDE SERPL-SCNC: 100 MMOL/L (ref 98–107)
CO2 SERPL-SCNC: 26 MMOL/L (ref 22–29)
CREAT SERPL-MCNC: 0.95 MG/DL (ref 0.76–1.27)
DEPRECATED RDW RBC AUTO: 38.5 FL (ref 37–54)
EGFRCR SERPLBLD CKD-EPI 2021: 95.1 ML/MIN/1.73
EOSINOPHIL # BLD AUTO: 0.14 10*3/MM3 (ref 0–0.4)
EOSINOPHIL NFR BLD AUTO: 1.5 % (ref 0.3–6.2)
ERYTHROCYTE [DISTWIDTH] IN BLOOD BY AUTOMATED COUNT: 12.1 % (ref 12.3–15.4)
GLOBULIN UR ELPH-MCNC: 2.8 GM/DL
GLUCOSE SERPL-MCNC: 144 MG/DL (ref 65–99)
HCT VFR BLD AUTO: 45.7 % (ref 37.5–51)
HGB BLD-MCNC: 15.7 G/DL (ref 13–17.7)
IMM GRANULOCYTES # BLD AUTO: 0.02 10*3/MM3 (ref 0–0.05)
IMM GRANULOCYTES NFR BLD AUTO: 0.2 % (ref 0–0.5)
LYMPHOCYTES # BLD AUTO: 2.17 10*3/MM3 (ref 0.7–3.1)
LYMPHOCYTES NFR BLD AUTO: 22.7 % (ref 19.6–45.3)
MCH RBC QN AUTO: 29.7 PG (ref 26.6–33)
MCHC RBC AUTO-ENTMCNC: 34.4 G/DL (ref 31.5–35.7)
MCV RBC AUTO: 86.6 FL (ref 79–97)
MONOCYTES # BLD AUTO: 0.73 10*3/MM3 (ref 0.1–0.9)
MONOCYTES NFR BLD AUTO: 7.6 % (ref 5–12)
NEUTROPHILS NFR BLD AUTO: 6.46 10*3/MM3 (ref 1.7–7)
NEUTROPHILS NFR BLD AUTO: 67.6 % (ref 42.7–76)
PLATELET # BLD AUTO: 267 10*3/MM3 (ref 140–450)
PMV BLD AUTO: 9.4 FL (ref 6–12)
POTASSIUM SERPL-SCNC: 4 MMOL/L (ref 3.5–5.2)
PROT SERPL-MCNC: 7.8 G/DL (ref 6–8.5)
RBC # BLD AUTO: 5.28 10*6/MM3 (ref 4.14–5.8)
SODIUM SERPL-SCNC: 139 MMOL/L (ref 136–145)
WBC NRBC COR # BLD: 9.56 10*3/MM3 (ref 3.4–10.8)

## 2023-02-13 PROCEDURE — 80053 COMPREHEN METABOLIC PANEL: CPT

## 2023-02-13 PROCEDURE — 99214 OFFICE O/P EST MOD 30 MIN: CPT | Performed by: NURSE PRACTITIONER

## 2023-02-13 PROCEDURE — 99213 OFFICE O/P EST LOW 20 MIN: CPT | Performed by: STUDENT IN AN ORGANIZED HEALTH CARE EDUCATION/TRAINING PROGRAM

## 2023-02-13 PROCEDURE — 85025 COMPLETE CBC W/AUTO DIFF WBC: CPT

## 2023-02-13 PROCEDURE — 36415 COLL VENOUS BLD VENIPUNCTURE: CPT

## 2023-02-13 RX ORDER — FLUCONAZOLE 100 MG/1
100 TABLET ORAL DAILY
Qty: 5 TABLET | Refills: 0 | Status: SHIPPED | OUTPATIENT
Start: 2023-02-13 | End: 2023-02-21 | Stop reason: HOSPADM

## 2023-02-13 RX ORDER — DOCUSATE SODIUM 100 MG/1
100 CAPSULE, LIQUID FILLED ORAL DAILY
Qty: 60 CAPSULE | Refills: 3 | Status: SHIPPED | OUTPATIENT
Start: 2023-02-13

## 2023-02-13 RX ORDER — LEVETIRACETAM 500 MG/1
500 TABLET ORAL 2 TIMES DAILY
Qty: 60 TABLET | Refills: 0 | Status: SHIPPED | OUTPATIENT
Start: 2023-02-13

## 2023-02-13 RX ORDER — HYDROCODONE BITARTRATE AND ACETAMINOPHEN 5; 325 MG/1; MG/1
1 TABLET ORAL EVERY 6 HOURS PRN
COMMUNITY

## 2023-02-13 RX ORDER — DEXAMETHASONE 6 MG/1
6 TABLET ORAL EVERY 6 HOURS
Qty: 60 TABLET | Refills: 0 | Status: SHIPPED | OUTPATIENT
Start: 2023-02-13 | End: 2023-02-17 | Stop reason: DRUGHIGH

## 2023-02-13 RX ORDER — HYDROCODONE BITARTRATE AND ACETAMINOPHEN 5; 325 MG/1; MG/1
1 TABLET ORAL EVERY 6 HOURS PRN
Qty: 120 TABLET | Refills: 0 | Status: SHIPPED | OUTPATIENT
Start: 2023-02-13

## 2023-02-13 NOTE — PROGRESS NOTES
"NEUROSURGERY PROGRESS NOTE    Patient: Nick Pachecolch  : 1970    Primary Care Provider: Alicia Devlin PA    Chief Complaint: Glioblastoma    Subjective: This is a 52-year-old male known to me as I resected a very large left frontal glioblastoma in 2022.  Patient subsequently underwent adjuvant chemotherapy and radiation.  He comes in with 3 weeks of progressively worsening headaches.  He is also had some intermittent shaking of the right lower extremity over the past week.  The family also notes mild issues with speech.  The headaches got so bad that they went to the emergency room a few days ago and obtained a head CT which showed significant edema in the bilateral frontal lobes.  He has not been on any steroids.  He denies any acute changes in his vision or weakness in his arms and legs.    Objective    Vital Signs: Blood pressure 118/74, temperature 97.3 °F (36.3 °C), temperature source Infrared, height 182.9 cm (72\"), weight 90.7 kg (200 lb).    Physical Exam  Awake, alert and oriented x 3  Opens eyes spont  Pupils 3 mm rx bilat  Extraocular muscles intact bilaterally  Face symmetric bilaterally  Tongue midline  5/5 in all 4 ext  No pronator drift    Current Medications:   Current Outpatient Medications:   •  amLODIPine (NORVASC) 5 MG tablet, Take 5 mg by mouth Daily., Disp: , Rfl:   •  butalbital-acetaminophen-caffeine (FIORICET, ESGIC) -40 MG per tablet, Take 1 tablet by mouth Every 6 (Six) Hours As Needed for Headache., Disp: 12 tablet, Rfl: 0  •  lisinopril (PRINIVIL,ZESTRIL) 20 MG tablet, Take 20 mg by mouth Daily., Disp: , Rfl:   •  niacin 500 MG tablet, Take 500 mg by mouth Every Night., Disp: , Rfl:   •  ondansetron (ZOFRAN) 8 MG tablet, Take 1 tablet by mouth 3 (Three) Times a Day As Needed for Nausea or Vomiting., Disp: 30 tablet, Rfl: 5  •  ondansetron (ZOFRAN) 8 MG tablet, Take 1 tablet by mouth 3 (Three) Times a Day As Needed for Nausea or Vomiting., Disp: 30 " tablet, Rfl: 5  •  temozolomide (TEMODAR) 140 MG chemo capsule, Take 1 capsule by mouth with 1 other temozolomide prescription for 320 mg total Daily. Take a total of 320mg daily (1 capsule of 140mg and 1 capsule of 180mg) on days 1-5 of each 28-day cycle., Disp: 5 capsule, Rfl: 11  •  temozolomide (Temodar) 180 MG chemo capsule, Take 1 capsule by mouth every night at bedtime on days 1-42 during radiation treatment., Disp: 14 capsule, Rfl: 0  •  temozolomide (TEMODAR) 180 MG chemo capsule, Take 1 capsule by mouth with 1 other temozolomide prescription for 320 mg total Daily. Take a total of 320mg daily (1 capsule of 140mg and 1 capsule of 180mg) on days 1-5 of each 28-day cycle., Disp: 5 capsule, Rfl: 11  •  VASCEPA 1 g capsule capsule, , Disp: , Rfl:   •  dexamethasone (DECADRON) 6 MG tablet, Take 1 tablet by mouth Every 6 (Six) Hours., Disp: 60 tablet, Rfl: 0  •  docusate sodium (COLACE) 100 MG capsule, Take 1 capsule by mouth Daily. Two capusles, Disp: 60 capsule, Rfl: 3  •  fluconazole (Diflucan) 100 MG tablet, Take 1 tablet by mouth Daily., Disp: 5 tablet, Rfl: 0  •  HYDROcodone-acetaminophen (NORCO) 5-325 MG per tablet, Take 1 tablet by mouth Every 6 (Six) Hours As Needed., Disp: , Rfl:   •  levETIRAcetam (Keppra) 500 MG tablet, Take 1 tablet by mouth 2 (Two) Times a Day., Disp: 60 tablet, Rfl: 0  •  nystatin (MYCOSTATIN) 100,000 unit/mL suspension, Swish and swallow 5 mL 4 (Four) Times a Day., Disp: 473 mL, Rfl: 1     Laboratory Results:      Lab 02/13/23  0935 02/09/23  1631   WBC 9.56 7.81   HEMOGLOBIN 15.7 14.6   HEMATOCRIT 45.7 42.5   PLATELETS 267 254   NEUTROS ABS 6.46 4.91   IMMATURE GRANS (ABS) 0.02 0.03   LYMPHS ABS 2.17 1.89   MONOS ABS 0.73 0.79   EOS ABS 0.14 0.14   MCV 86.6 87.1   SED RATE  --  4   CRP  --  0.61*         Lab 02/13/23  0935 02/09/23  1631   SODIUM 139 139   POTASSIUM 4.0 4.4   CHLORIDE 100 101   CO2 26.0 28.4   ANION GAP 13.0 9.6   BUN 19 21*   CREATININE 0.95 1.00   EGFR 95.1  89.4   GLUCOSE 144* 108*   CALCIUM 9.7 9.4         Lab 02/13/23  0935 02/09/23  1631   TOTAL PROTEIN 7.8 6.7   ALBUMIN 5.0 4.2   GLOBULIN 2.8 2.5   ALT (SGPT) 14 17   AST (SGOT) 15 16   BILIRUBIN 1.8* 1.4*   ALK PHOS 97 82                     Brief Urine Lab Results     None        Microbiology Results (last 10 days)     ** No results found for the last 240 hours. **          Diagnostic Imaging: I reviewed and independently interpreted the new imaging.     Assessment/Plan:  This is a 52-year-old male status post resection of a large left frontal glioblastoma in September 2022 who subsequently underwent adjuvant chemotherapy and radiation.  He comes in with 3 weeks of progressively worsening headaches as well as 1 week of mild shaking of the right leg.  His head CT from a few days ago does show bilateral edema in the frontal lobes.  I am going to obtain a stat MRI to evaluate for tumor progression.  Additionally, I am going to start him on Decadron and restart his Keppra.  I plan to call the patient and his family once I review his new MRI.  If there is tumor progression we will proceed with additional chemotherapy.    Diagnoses and all orders for this visit:    1. Glioblastoma (HCC) (Primary)  -     MRI Brain With & Without Contrast; Future    Other orders  -     dexamethasone (DECADRON) 6 MG tablet; Take 1 tablet by mouth Every 6 (Six) Hours.  Dispense: 60 tablet; Refill: 0  -     levETIRAcetam (Keppra) 500 MG tablet; Take 1 tablet by mouth 2 (Two) Times a Day.  Dispense: 60 tablet; Refill: 0        Umair Patton MD  02/13/23  12:12 EST

## 2023-02-13 NOTE — TELEPHONE ENCOUNTER
Elizabeth with [facility] called she needs clarification on the directions for the prescription Docusate sodium. Please call.

## 2023-02-13 NOTE — TELEPHONE ENCOUNTER
Caller: malinda candelario    Relationship: Emergency Contact    Best call back number: [phone]        Who are you requesting to speak with (clinical staff, provider,  specific staff member):         What was the call regarding: WANTED TO LET KNOW IS GOING TO GO AHEAD AND GO HOME AND GET FLUIDS THERE,     JUST NEED TO CANCEL THE FLUIDS ADDED ON FOR 2PM TODAY 02/13    Do you require a callback: NO UNLESS QUESTIONS.

## 2023-02-13 NOTE — PROGRESS NOTES
DATE OF VISIT: 2/13/2023    REASON FOR VISIT: Followup for left frontal lobe GBM     PROBLEM LIST:  1.  Left frontal lobe GBM:  A.  Presented with headaches and slurred speech  B.  Diagnosed after craniotomy done by Dr. Beach September 20, 2022  C.  Started definitive concurrent chemotherapy with radiation using Temodar October 2022.  D.  Started maintenance Temodar January 17 , 2022.  Status post 1 cycle.  2.  Hemotherapy induced nausea  3.  Hypertension  4.  Seizure disorder  5. Oral candidiasis    HISTORY OF PRESENT ILLNESS: The patient is a very pleasant 52 y.o. male  with past medical history significant for GBM diagnosed September 20, 2022. The  patient is here today for scheduled follow-up visit.    SUBJECTIVE: The patient is here today with his family. He has not been feeling well. He complains of fatigue, as well as increased headaches. He went to the ER on 2/9/2023 for his headaches and had CT done that revealed frontal lobe mass with edema. He was not started on steroids, but did receive one dose of Dexamethasone in the ER. He also complains of pain with swallowing and taste alteration. He has completed one cycle of maintenance Temodar and is due to start his next cycle on 2/20/2023. He denies fevers, chills, nausea, vomiting, or diarrhea. He does complain of constipation induced by narcotics use. He is taking Norco 7.5 every 6 hours, but would like to try something not as strong due to sedation. He is scheduled to see Dr. Beach after our visit today for follow up.     Past History:  Medical History: has a past medical history of Diverticulitis, High triglycerides, Hyperlipidemia, Hypertension, Skin cancer, and Sleep apnea.   Surgical History: has a past surgical history that includes Eye surgery (Bilateral, 2008); Skin surgery (2010); Colonoscopy; Craniotomy for Tumor (N/A, 09/21/2022); and Craniotomy (08/21/2022).   Family History: family history includes Cancer in his mother; Heart attack in his  "mother; Hyperlipidemia in his father; Hypertension in his brother; Melanoma in his mother; Stroke in his father.   Social History: reports that he has quit smoking. His smoking use included cigarettes. His smokeless tobacco use includes snuff. He reports that he does not currently use alcohol. He reports that he does not use drugs.    (Not in a hospital admission)     Allergies: Patient has no known allergies.     Review of Systems   Constitutional: Positive for fatigue.   HENT: Positive for sore throat and trouble swallowing.    Gastrointestinal: Positive for constipation.   Neurological: Positive for tremors, weakness and headaches.       PHYSICAL EXAMINATION:   /80   Pulse 93   Temp 97.1 °F (36.2 °C) (Infrared)   Resp 18   Ht 182.9 cm (72.01\")   Wt 91.2 kg (201 lb)   SpO2 95%   BMI 27.25 kg/m²    Pain Score    02/13/23 0948   PainSc: 0-No pain        ECOG score: 1        ECOG Performance Status: 1 - Symptomatic but completely ambulatory      General Appearance:      alert, cooperative, no apparent distress, appears stated age and frail appearing   Lungs:   Clear to auscultation bilaterally; respirations regular, even, and unlabored bilaterally   Heart:  Regular rate and rhythm, no murmurs appreciated   Abdomen:  Mucous membranes:   Soft, non-tender, non-distended and no organomegaly   Tongue with thick yellow/white coating with patchy while plaques to posterior pharynx.                  Lab on 02/13/2023   Component Date Value Ref Range Status   • WBC 02/13/2023 9.56  3.40 - 10.80 10*3/mm3 Final   • RBC 02/13/2023 5.28  4.14 - 5.80 10*6/mm3 Final   • Hemoglobin 02/13/2023 15.7  13.0 - 17.7 g/dL Final   • Hematocrit 02/13/2023 45.7  37.5 - 51.0 % Final   • MCV 02/13/2023 86.6  79.0 - 97.0 fL Final   • MCH 02/13/2023 29.7  26.6 - 33.0 pg Final   • MCHC 02/13/2023 34.4  31.5 - 35.7 g/dL Final   • RDW 02/13/2023 12.1 (L)  12.3 - 15.4 % Final   • RDW-SD 02/13/2023 38.5  37.0 - 54.0 fl Final   • MPV " 02/13/2023 9.4  6.0 - 12.0 fL Final   • Platelets 02/13/2023 267  140 - 450 10*3/mm3 Final   • Neutrophil % 02/13/2023 67.6  42.7 - 76.0 % Final   • Lymphocyte % 02/13/2023 22.7  19.6 - 45.3 % Final   • Monocyte % 02/13/2023 7.6  5.0 - 12.0 % Final   • Eosinophil % 02/13/2023 1.5  0.3 - 6.2 % Final   • Basophil % 02/13/2023 0.4  0.0 - 1.5 % Final   • Immature Grans % 02/13/2023 0.2  0.0 - 0.5 % Final   • Neutrophils, Absolute 02/13/2023 6.46  1.70 - 7.00 10*3/mm3 Final   • Lymphocytes, Absolute 02/13/2023 2.17  0.70 - 3.10 10*3/mm3 Final   • Monocytes, Absolute 02/13/2023 0.73  0.10 - 0.90 10*3/mm3 Final   • Eosinophils, Absolute 02/13/2023 0.14  0.00 - 0.40 10*3/mm3 Final   • Basophils, Absolute 02/13/2023 0.04  0.00 - 0.20 10*3/mm3 Final   • Immature Grans, Absolute 02/13/2023 0.02  0.00 - 0.05 10*3/mm3 Final   Admission on 02/09/2023, Discharged on 02/09/2023   Component Date Value Ref Range Status   • Glucose 02/09/2023 108 (H)  65 - 99 mg/dL Final   • BUN 02/09/2023 21 (H)  6 - 20 mg/dL Final   • Creatinine 02/09/2023 1.00  0.76 - 1.27 mg/dL Final   • Sodium 02/09/2023 139  136 - 145 mmol/L Final   • Potassium 02/09/2023 4.4  3.5 - 5.2 mmol/L Final    Slight hemolysis detected by analyzer. Results may be affected.   • Chloride 02/09/2023 101  98 - 107 mmol/L Final   • CO2 02/09/2023 28.4  22.0 - 29.0 mmol/L Final   • Calcium 02/09/2023 9.4  8.6 - 10.5 mg/dL Final   • Total Protein 02/09/2023 6.7  6.0 - 8.5 g/dL Final   • Albumin 02/09/2023 4.2  3.5 - 5.2 g/dL Final   • ALT (SGPT) 02/09/2023 17  1 - 41 U/L Final   • AST (SGOT) 02/09/2023 16  1 - 40 U/L Final    Slight hemolysis detected by analyzer. Results may be affected.   • Alkaline Phosphatase 02/09/2023 82  39 - 117 U/L Final   • Total Bilirubin 02/09/2023 1.4 (H)  0.0 - 1.2 mg/dL Final   • Globulin 02/09/2023 2.5  gm/dL Final   • A/G Ratio 02/09/2023 1.7  g/dL Final   • BUN/Creatinine Ratio 02/09/2023 21.0  7.0 - 25.0 Final   • Anion Gap 02/09/2023 9.6   5.0 - 15.0 mmol/L Final   • eGFR 02/09/2023 89.4  >60.0 mL/min/1.73 Final   • C-Reactive Protein 02/09/2023 0.61 (H)  0.00 - 0.50 mg/dL Final   • Sed Rate 02/09/2023 4  0 - 20 mm/hr Final   • WBC 02/09/2023 7.81  3.40 - 10.80 10*3/mm3 Final   • RBC 02/09/2023 4.88  4.14 - 5.80 10*6/mm3 Final   • Hemoglobin 02/09/2023 14.6  13.0 - 17.7 g/dL Final   • Hematocrit 02/09/2023 42.5  37.5 - 51.0 % Final   • MCV 02/09/2023 87.1  79.0 - 97.0 fL Final   • MCH 02/09/2023 29.9  26.6 - 33.0 pg Final   • MCHC 02/09/2023 34.4  31.5 - 35.7 g/dL Final   • RDW 02/09/2023 12.3  12.3 - 15.4 % Final   • RDW-SD 02/09/2023 39.2  37.0 - 54.0 fl Final   • MPV 02/09/2023 9.6  6.0 - 12.0 fL Final   • Platelets 02/09/2023 254  140 - 450 10*3/mm3 Final   • Neutrophil % 02/09/2023 62.9  42.7 - 76.0 % Final   • Lymphocyte % 02/09/2023 24.2  19.6 - 45.3 % Final   • Monocyte % 02/09/2023 10.1  5.0 - 12.0 % Final   • Eosinophil % 02/09/2023 1.8  0.3 - 6.2 % Final   • Basophil % 02/09/2023 0.6  0.0 - 1.5 % Final   • Immature Grans % 02/09/2023 0.4  0.0 - 0.5 % Final   • Neutrophils, Absolute 02/09/2023 4.91  1.70 - 7.00 10*3/mm3 Final   • Lymphocytes, Absolute 02/09/2023 1.89  0.70 - 3.10 10*3/mm3 Final   • Monocytes, Absolute 02/09/2023 0.79  0.10 - 0.90 10*3/mm3 Final   • Eosinophils, Absolute 02/09/2023 0.14  0.00 - 0.40 10*3/mm3 Final   • Basophils, Absolute 02/09/2023 0.05  0.00 - 0.20 10*3/mm3 Final   • Immature Grans, Absolute 02/09/2023 0.03  0.00 - 0.05 10*3/mm3 Final   • nRBC 02/09/2023 0.0  0.0 - 0.2 /100 WBC Final        CT Head With & Without Contrast    Result Date: 2/9/2023  Narrative: FINAL REPORT TECHNIQUE: Multiple axial CT sections were performed from the foramen magnum to the vertex.  Coronal reformatted images were also obtained.  Precontrast and postcontrast injection images were obtained.  This study was performed with technique to keep radiation doses as low as reasonably achievable, (ALARA). Individualized dose reduction  techniques using automated exposure control or adjustment of mA and/or kV according to the patient size were employed. CLINICAL HISTORY: headache COMPARISON: MRI brain 1/10/2023 FINDINGS: There is a large malignancy encompassing both frontal lobes crossing midline across the genu of the corpus callosum.  The discrete mass is difficult to measure but measures at least 4 cm x 3.5 cm, best visualized on postcontrast enhanced image 18 of series 5.  There is extensive surrounding vasogenic edema and/or infiltrative tumor in both frontal lobes.  There are postoperative changes from probable prior tumor debulking in the left frontal lobe.  There has been a left frontal craniotomy.     Impression: Findings are most compatible with malignant brain tumor, most likely glioblastoma multiforme. Authenticated and Electronically Signed by Aram Baldwin MD on 02/09/2023 07:52:30 PM      ASSESSMENT: The patient is a very pleasant 52 y.o. male  with left frontal lobe GBM      PLAN:    1.  Left frontal lobe GBM:  A.  The patient had CT scan done in the ER on 2/9/2023 with findings concerning for progressive disease. He is scheduled to see Dr. Patton today for evaluation.   B. If neurosurgery confirms disease progression, we will plan to add Avastin to his Temodar.   C. We will defer to neurosurgery regarding restarting dexamethasone secondary to increased neurologic symptoms including headaches and tremor.   D. I reviewed the lab results from today with the patient. His blood counts including WBC, hemoglobin, and platelet count are normal. We will follow up on the CMP results from today.   E. He is scheduled to start cycle #2 of maintenance Temodar on 2/20/2023.   F.  I will continue to monitor the patient's blood work including blood counts, kidney function, liver functions, and electrolytes.  G. We will continue Bactrim prophylaxis once daily on Monday, Wednesday, and Friday.     2.  Brain edema:  A. He likely needs dexamethasone  secondary to edema and neurologic complaints.    B. He is seeing Dr. Patton today for follow up.     3.  Seizure prophylaxis:  A.  He is off Keppra currently. He denies overt seizure activity but is having increased tremor.     4.  Hypertension:  A.  I will continue lisinopril 20 mg daily. He has been taken off his Norvasc by his PCP secondary to low blood pressure.     5. Headaches:  A. He was seen in the ER on 2/9/2023 for complaints of increased headaches. He had CT that showed frontal lobe mass with associated edema.  B. He will continue Norco, however we will change his dose to 5/325 mg every 6 hours due to increased sedation with his current 7.5/325 mg dose.     6. Constipation:  A. We will start him on Colace twice a day as well as Miralax daily for narcotic induced constipation. He was given a prescription for Colace today.     7. Thrush:  A. We will start him on Dilfucan 100 mg daily for 5 days with Nystatin mouthwash swish and swallow four times per day. I send in prescriptions for both of these today. I encouraged him to use Nystatin mouthwash for at least 2 weeks.     8. Poor PO intake:  A. We will given him 1L IVF today for dehydration and poor PO intake secondary to thrush.     FOLLOW UP: We will follow up on neurosurgery recommendations for treatment planning.    Katelynn Sanchez, APRN  2/13/2023

## 2023-02-14 ENCOUNTER — HOSPITAL ENCOUNTER (OUTPATIENT)
Dept: MRI IMAGING | Facility: HOSPITAL | Age: 53
Discharge: HOME | End: 2023-02-14
Admitting: STUDENT IN AN ORGANIZED HEALTH CARE EDUCATION/TRAINING PROGRAM
Payer: OTHER GOVERNMENT

## 2023-02-14 ENCOUNTER — TELEPHONE (OUTPATIENT)
Dept: NEUROSURGERY | Facility: CLINIC | Age: 53
End: 2023-02-14
Payer: OTHER GOVERNMENT

## 2023-02-14 DIAGNOSIS — C71.9 GLIOBLASTOMA: ICD-10-CM

## 2023-02-14 PROCEDURE — 70553 MRI BRAIN STEM W/O & W/DYE: CPT

## 2023-02-14 PROCEDURE — 0 GADOBENATE DIMEGLUMINE 529 MG/ML SOLUTION: Performed by: STUDENT IN AN ORGANIZED HEALTH CARE EDUCATION/TRAINING PROGRAM

## 2023-02-14 PROCEDURE — A9577 INJ MULTIHANCE: HCPCS | Performed by: STUDENT IN AN ORGANIZED HEALTH CARE EDUCATION/TRAINING PROGRAM

## 2023-02-14 RX ADMIN — GADOBENATE DIMEGLUMINE 15 ML: 529 INJECTION, SOLUTION INTRAVENOUS at 10:15

## 2023-02-14 NOTE — TELEPHONE ENCOUNTER
Dr. Patton is going to review imaging and call pt and family tomorrow.      Called pt to inform -unable to leave ms.   S/W Aron - he verbalized understanding and was thankful for the call back.

## 2023-02-14 NOTE — TELEPHONE ENCOUNTER
Called pt to inform him that Dr. Patton is currently in a procedure, but when he is completed he will contact the pt and discuss results.  Spouse verbalized understanding.  He states pt is doing much better today after starting steroids.

## 2023-02-15 ENCOUNTER — SPECIALTY PHARMACY (OUTPATIENT)
Dept: ONCOLOGY | Facility: HOSPITAL | Age: 53
End: 2023-02-15
Payer: OTHER GOVERNMENT

## 2023-02-15 ENCOUNTER — DOCUMENTATION (OUTPATIENT)
Dept: NEUROSURGERY | Facility: CLINIC | Age: 53
End: 2023-02-15
Payer: OTHER GOVERNMENT

## 2023-02-15 DIAGNOSIS — C71.9 GLIOBLASTOMA: ICD-10-CM

## 2023-02-15 RX ORDER — TEMOZOLOMIDE 140 MG/1
420 CAPSULE ORAL DAILY
Qty: 15 CAPSULE | Refills: 11 | Status: SHIPPED | OUTPATIENT
Start: 2023-02-15 | End: 2023-02-16 | Stop reason: SDUPTHER

## 2023-02-15 NOTE — PROGRESS NOTES
Re: Refills of Oral Specialty Medication - Temodar (temozolomide)    • Drug-Drug Interactions: The current medication list was reviewed and there are no relevant drug-drug interactions.  • Medication Allergies: The patient has NKDA  • Review of Labs/Dose Adjustments: Increasing temozolomide maintenance dosing for cycle 2.  Patient tolerated Cycle 1 in regards to toxicity and his lab work is appropriate.    Temozolomide 200mg/m2 x 2.13 = 426 mg (rounded due to capsule size)    Drug: Temodar (temozolomide)  Strength: 140 mg  Directions: Take 3 capsules by mouth daily on days 1-5 of each 28 day cycle  Quantity: 15  Refills: 11  Pharmacy prescription sent to: [id number]    Due to insurance restrictions, sent script to [facility].    Tuan OrtezD, BCOP  Clinical Oncology Pharmacy Specialist  Phone: [phone]      2/15/2023  15:23 EST

## 2023-02-15 NOTE — PROGRESS NOTES
I spoke to the patient over the phone regarding the results of his new MRI scan.  Fortunately, this looks to be extensive disease progression.  He has found some benefit with steroids which we will continue.  I am going to touch base with his oncologist to see if there is another chemotherapy option to be started.  He already has a scheduled follow-up appointment with me in 4 weeks.

## 2023-02-16 ENCOUNTER — SPECIALTY PHARMACY (OUTPATIENT)
Dept: PHARMACY | Facility: HOSPITAL | Age: 53
End: 2023-02-16
Payer: OTHER GOVERNMENT

## 2023-02-16 RX ORDER — TEMOZOLOMIDE 140 MG/1
420 CAPSULE ORAL DAILY
Qty: 15 CAPSULE | Refills: 11 | Status: SHIPPED | OUTPATIENT
Start: 2023-02-16 | End: 2023-02-17 | Stop reason: SDUPTHER

## 2023-02-17 ENCOUNTER — SPECIALTY PHARMACY (OUTPATIENT)
Dept: ONCOLOGY | Facility: HOSPITAL | Age: 53
End: 2023-02-17
Payer: OTHER GOVERNMENT

## 2023-02-17 ENCOUNTER — TELEPHONE (OUTPATIENT)
Dept: ONCOLOGY | Facility: CLINIC | Age: 53
End: 2023-02-17

## 2023-02-17 ENCOUNTER — OFFICE VISIT (OUTPATIENT)
Dept: ONCOLOGY | Facility: CLINIC | Age: 53
End: 2023-02-17
Payer: OTHER GOVERNMENT

## 2023-02-17 VITALS
DIASTOLIC BLOOD PRESSURE: 87 MMHG | OXYGEN SATURATION: 98 % | HEART RATE: 54 BPM | TEMPERATURE: 97.1 F | BODY MASS INDEX: 28.04 KG/M2 | RESPIRATION RATE: 16 BRPM | WEIGHT: 207 LBS | SYSTOLIC BLOOD PRESSURE: 136 MMHG | HEIGHT: 72 IN

## 2023-02-17 DIAGNOSIS — C71.9 GLIOBLASTOMA: Primary | ICD-10-CM

## 2023-02-17 PROCEDURE — 99215 OFFICE O/P EST HI 40 MIN: CPT | Performed by: INTERNAL MEDICINE

## 2023-02-17 RX ORDER — TEMOZOLOMIDE 140 MG/1
420 CAPSULE ORAL DAILY
Qty: 15 CAPSULE | Refills: 11 | Status: SHIPPED | OUTPATIENT
Start: 2023-02-17 | End: 2023-02-17 | Stop reason: ALTCHOICE

## 2023-02-17 RX ORDER — ONDANSETRON HYDROCHLORIDE 8 MG/1
8 TABLET, FILM COATED ORAL 3 TIMES DAILY PRN
Qty: 30 TABLET | Refills: 5 | Status: SHIPPED | OUTPATIENT
Start: 2023-02-17

## 2023-02-17 RX ORDER — SODIUM CHLORIDE 9 MG/ML
250 INJECTION, SOLUTION INTRAVENOUS ONCE
Status: CANCELLED | OUTPATIENT
Start: 2023-03-10

## 2023-02-17 RX ORDER — SODIUM CHLORIDE 9 MG/ML
250 INJECTION, SOLUTION INTRAVENOUS ONCE
Status: CANCELLED | OUTPATIENT
Start: 2023-02-24

## 2023-02-17 RX ORDER — DEXAMETHASONE 2 MG/1
TABLET ORAL
Qty: 54 TABLET | Refills: 0 | Status: SHIPPED | OUTPATIENT
Start: 2023-02-17

## 2023-02-17 RX ORDER — SODIUM CHLORIDE 9 MG/ML
250 INJECTION, SOLUTION INTRAVENOUS ONCE
Status: CANCELLED | OUTPATIENT
Start: 2023-03-24

## 2023-02-17 NOTE — TELEPHONE ENCOUNTER
Aron patient's  would like for Dr. Rader to call him to discuss the new treatment that was presented as an option. He stated he really didn't process much at the appointment, and now has many questions.

## 2023-02-17 NOTE — TELEPHONE ENCOUNTER
Denise Sharp's phone call.  He wanted to know how long patient had without treatment and with and response rate.  I advised that without treatment Dr. Rader said less than 3 months, with treatment 6-9 months and a 50% response rate if he does respond to it.  I advised if they change their mind about treatment before next week to let me know.  He asked how patients do with this treatment and I said overall patients tolerate well, but that we have to monitor his BP as it can increase that.  He verbalized understanding.

## 2023-02-17 NOTE — PROGRESS NOTES
DATE OF VISIT: 2/17/2023    REASON FOR VISIT: Followup for left frontal lobe GBM     PROBLEM LIST:  1.  Left frontal lobe GBM:  A.  Presented with headaches and slurred speech  B.  Diagnosed after craniotomy done by Dr. Beach September 20, 2022  C.  Started definitive concurrent chemotherapy with radiation using Temodar October 2022.  D.  Started maintenance Temodar January 17 , 2022.  Status post 1 cycle.  E.  Progressive disease document MRI brain done February 2023  F.  Switch treatment to Avastin February 22, 2023.  2.  Hemotherapy induced nausea  3.  Hypertension  4.  Seizure disorder  5. Oral candidiasis    HISTORY OF PRESENT ILLNESS: The patient is a very pleasant 52 y.o. male  with past medical history significant for GBM diagnosed September 20, 2022. The  patient is here today for scheduled follow-up visit.    SUBJECTIVE: Reinaldo is here today with his family including his  mother and mother-in-law and sister.  He is complaining of on and off headaches.    Past History:  Medical History: has a past medical history of Diverticulitis, High triglycerides, Hyperlipidemia, Hypertension, Skin cancer, and Sleep apnea.   Surgical History: has a past surgical history that includes Eye surgery (Bilateral, 2008); Skin surgery (2010); Colonoscopy; Craniotomy for Tumor (N/A, 09/21/2022); and Craniotomy (08/21/2022).   Family History: family history includes Cancer in his mother; Heart attack in his mother; Hyperlipidemia in his father; Hypertension in his brother; Melanoma in his mother; Stroke in his father.   Social History: reports that he has quit smoking. His smoking use included cigarettes. His smokeless tobacco use includes snuff. He reports that he does not currently use alcohol. He reports that he does not use drugs.    (Not in a hospital admission)     Allergies: Patient has no known allergies.     Review of Systems   Constitutional: Positive for fatigue.   Gastrointestinal: Positive for constipation.    Neurological: Positive for weakness and headaches.   Psychiatric/Behavioral: The patient is nervous/anxious.        PHYSICAL EXAMINATION:   There were no vitals taken for this visit.   There were no vitals filed for this visit.              ECOG Performance Status: 2 - Symptomatic, <50% confined to bed      General Appearance:      alert, cooperative, no apparent distress, appears stated age and frail appearing   Lungs:   Clear to auscultation bilaterally; respirations regular, even, and unlabored bilaterally   Heart:  Regular rate and rhythm, no murmurs appreciated   Abdomen:  Mucous membranes:   Soft, non-tender, non-distended and no organomegaly   Tongue with thick yellow/white coating with patchy while plaques to posterior pharynx.                  Lab on 02/13/2023   Component Date Value Ref Range Status   • Glucose 02/13/2023 144 (H)  65 - 99 mg/dL Final   • BUN 02/13/2023 19  6 - 20 mg/dL Final   • Creatinine 02/13/2023 0.95  0.76 - 1.27 mg/dL Final   • Sodium 02/13/2023 139  136 - 145 mmol/L Final   • Potassium 02/13/2023 4.0  3.5 - 5.2 mmol/L Final   • Chloride 02/13/2023 100  98 - 107 mmol/L Final   • CO2 02/13/2023 26.0  22.0 - 29.0 mmol/L Final   • Calcium 02/13/2023 9.7  8.6 - 10.5 mg/dL Final   • Total Protein 02/13/2023 7.8  6.0 - 8.5 g/dL Final   • Albumin 02/13/2023 5.0  3.5 - 5.2 g/dL Final   • ALT (SGPT) 02/13/2023 14  1 - 41 U/L Final   • AST (SGOT) 02/13/2023 15  1 - 40 U/L Final   • Alkaline Phosphatase 02/13/2023 97  39 - 117 U/L Final   • Total Bilirubin 02/13/2023 1.8 (H)  0.0 - 1.2 mg/dL Final   • Globulin 02/13/2023 2.8  gm/dL Final    Calculated Result   • A/G Ratio 02/13/2023 1.8  g/dL Final   • BUN/Creatinine Ratio 02/13/2023 20.0  7.0 - 25.0 Final   • Anion Gap 02/13/2023 13.0  5.0 - 15.0 mmol/L Final   • eGFR 02/13/2023 95.1  >60.0 mL/min/1.73 Final   • WBC 02/13/2023 9.56  3.40 - 10.80 10*3/mm3 Final   • RBC 02/13/2023 5.28  4.14 - 5.80 10*6/mm3 Final   • Hemoglobin 02/13/2023 15.7   13.0 - 17.7 g/dL Final   • Hematocrit 02/13/2023 45.7  37.5 - 51.0 % Final   • MCV 02/13/2023 86.6  79.0 - 97.0 fL Final   • MCH 02/13/2023 29.7  26.6 - 33.0 pg Final   • MCHC 02/13/2023 34.4  31.5 - 35.7 g/dL Final   • RDW 02/13/2023 12.1 (L)  12.3 - 15.4 % Final   • RDW-SD 02/13/2023 38.5  37.0 - 54.0 fl Final   • MPV 02/13/2023 9.4  6.0 - 12.0 fL Final   • Platelets 02/13/2023 267  140 - 450 10*3/mm3 Final   • Neutrophil % 02/13/2023 67.6  42.7 - 76.0 % Final   • Lymphocyte % 02/13/2023 22.7  19.6 - 45.3 % Final   • Monocyte % 02/13/2023 7.6  5.0 - 12.0 % Final   • Eosinophil % 02/13/2023 1.5  0.3 - 6.2 % Final   • Basophil % 02/13/2023 0.4  0.0 - 1.5 % Final   • Immature Grans % 02/13/2023 0.2  0.0 - 0.5 % Final   • Neutrophils, Absolute 02/13/2023 6.46  1.70 - 7.00 10*3/mm3 Final   • Lymphocytes, Absolute 02/13/2023 2.17  0.70 - 3.10 10*3/mm3 Final   • Monocytes, Absolute 02/13/2023 0.73  0.10 - 0.90 10*3/mm3 Final   • Eosinophils, Absolute 02/13/2023 0.14  0.00 - 0.40 10*3/mm3 Final   • Basophils, Absolute 02/13/2023 0.04  0.00 - 0.20 10*3/mm3 Final   • Immature Grans, Absolute 02/13/2023 0.02  0.00 - 0.05 10*3/mm3 Final   Admission on 02/09/2023, Discharged on 02/09/2023   Component Date Value Ref Range Status   • Glucose 02/09/2023 108 (H)  65 - 99 mg/dL Final   • BUN 02/09/2023 21 (H)  6 - 20 mg/dL Final   • Creatinine 02/09/2023 1.00  0.76 - 1.27 mg/dL Final   • Sodium 02/09/2023 139  136 - 145 mmol/L Final   • Potassium 02/09/2023 4.4  3.5 - 5.2 mmol/L Final    Slight hemolysis detected by analyzer. Results may be affected.   • Chloride 02/09/2023 101  98 - 107 mmol/L Final   • CO2 02/09/2023 28.4  22.0 - 29.0 mmol/L Final   • Calcium 02/09/2023 9.4  8.6 - 10.5 mg/dL Final   • Total Protein 02/09/2023 6.7  6.0 - 8.5 g/dL Final   • Albumin 02/09/2023 4.2  3.5 - 5.2 g/dL Final   • ALT (SGPT) 02/09/2023 17  1 - 41 U/L Final   • AST (SGOT) 02/09/2023 16  1 - 40 U/L Final    Slight hemolysis detected by  analyzer. Results may be affected.   • Alkaline Phosphatase 02/09/2023 82  39 - 117 U/L Final   • Total Bilirubin 02/09/2023 1.4 (H)  0.0 - 1.2 mg/dL Final   • Globulin 02/09/2023 2.5  gm/dL Final   • A/G Ratio 02/09/2023 1.7  g/dL Final   • BUN/Creatinine Ratio 02/09/2023 21.0  7.0 - 25.0 Final   • Anion Gap 02/09/2023 9.6  5.0 - 15.0 mmol/L Final   • eGFR 02/09/2023 89.4  >60.0 mL/min/1.73 Final   • C-Reactive Protein 02/09/2023 0.61 (H)  0.00 - 0.50 mg/dL Final   • Sed Rate 02/09/2023 4  0 - 20 mm/hr Final   • WBC 02/09/2023 7.81  3.40 - 10.80 10*3/mm3 Final   • RBC 02/09/2023 4.88  4.14 - 5.80 10*6/mm3 Final   • Hemoglobin 02/09/2023 14.6  13.0 - 17.7 g/dL Final   • Hematocrit 02/09/2023 42.5  37.5 - 51.0 % Final   • MCV 02/09/2023 87.1  79.0 - 97.0 fL Final   • MCH 02/09/2023 29.9  26.6 - 33.0 pg Final   • MCHC 02/09/2023 34.4  31.5 - 35.7 g/dL Final   • RDW 02/09/2023 12.3  12.3 - 15.4 % Final   • RDW-SD 02/09/2023 39.2  37.0 - 54.0 fl Final   • MPV 02/09/2023 9.6  6.0 - 12.0 fL Final   • Platelets 02/09/2023 254  140 - 450 10*3/mm3 Final   • Neutrophil % 02/09/2023 62.9  42.7 - 76.0 % Final   • Lymphocyte % 02/09/2023 24.2  19.6 - 45.3 % Final   • Monocyte % 02/09/2023 10.1  5.0 - 12.0 % Final   • Eosinophil % 02/09/2023 1.8  0.3 - 6.2 % Final   • Basophil % 02/09/2023 0.6  0.0 - 1.5 % Final   • Immature Grans % 02/09/2023 0.4  0.0 - 0.5 % Final   • Neutrophils, Absolute 02/09/2023 4.91  1.70 - 7.00 10*3/mm3 Final   • Lymphocytes, Absolute 02/09/2023 1.89  0.70 - 3.10 10*3/mm3 Final   • Monocytes, Absolute 02/09/2023 0.79  0.10 - 0.90 10*3/mm3 Final   • Eosinophils, Absolute 02/09/2023 0.14  0.00 - 0.40 10*3/mm3 Final   • Basophils, Absolute 02/09/2023 0.05  0.00 - 0.20 10*3/mm3 Final   • Immature Grans, Absolute 02/09/2023 0.03  0.00 - 0.05 10*3/mm3 Final   • nRBC 02/09/2023 0.0  0.0 - 0.2 /100 WBC Final        CT Head With & Without Contrast    Result Date: 2/9/2023  Narrative: FINAL REPORT TECHNIQUE:  Multiple axial CT sections were performed from the foramen magnum to the vertex.  Coronal reformatted images were also obtained.  Precontrast and postcontrast injection images were obtained.  This study was performed with technique to keep radiation doses as low as reasonably achievable, (ALARA). Individualized dose reduction techniques using automated exposure control or adjustment of mA and/or kV according to the patient size were employed. CLINICAL HISTORY: headache COMPARISON: MRI brain 1/10/2023 FINDINGS: There is a large malignancy encompassing both frontal lobes crossing midline across the genu of the corpus callosum.  The discrete mass is difficult to measure but measures at least 4 cm x 3.5 cm, best visualized on postcontrast enhanced image 18 of series 5.  There is extensive surrounding vasogenic edema and/or infiltrative tumor in both frontal lobes.  There are postoperative changes from probable prior tumor debulking in the left frontal lobe.  There has been a left frontal craniotomy.     Impression: Findings are most compatible with malignant brain tumor, most likely glioblastoma multiforme. Authenticated and Electronically Signed by Aram Baldwin MD on 02/09/2023 07:52:30 PM    MRI Brain With & Without Contrast    Result Date: 2/14/2023  Narrative: PROCEDURE: MRI BRAIN W WO CONTRAST-  HISTORY: glioblastoma; C71.9-Malignant neoplasm of brain, unspecified  COMPARISON: 01/10/2023  TECHNIQUE: Multiplanar imaging was performed of the brain with and without Gadolinium infusion.  FINDINGS: Diffusion sequences show evidence of acute infarct.  Large irregular enhancing lesion is seen centered in the left frontal lobe measuring 5.5 cm width x 6.7 cm AP x 4.2 cm height, previously 5.2 x 6.1 x 4.2 cm. There is irregular ring enhancement noted. There is overlying dural enhancement as well. Dural enhancement may be postoperative. Areas of necrosis and/or cystic degeneration are noted. The lesion does cross the  midline through the corpus callosum into the right frontal lobe, similar from prior exam.  Significant edema is noted of the frontal lobes in a distribution similar from prior exam. Edema does extend into the basal ganglia, left much greater than right in a more pronounced fashion from prior exam.  Ventricles are normal. Small extra-axial fluid collection overlying left frontal lobe probably from prior craniotomy has improved. There is no midline shift. No acute hemorrhage is seen.      Impression: IMPRESSION : 1. Minimal enlargement of irregular ring-enhancing lesion centered in the left frontal lobe crossing midline to extend into the right frontal lobe compatible with known GBM. 2. Significant vasogenic edema associated with the lesion shows minimal increased extension into the basal ganglia, left greater than right. 3. Ventricles normal without midline shift. 4. No evidence of acute hemorrhage.  This report was signed and finalized on 2/14/2023 11:24 AM by Kushal Escalante MD.      ASSESSMENT: The patient is a very pleasant 52 y.o. male  with left frontal lobe GBM      PLAN:    1.  Left frontal lobe GBM:  A.  I did go over his MRI results from February 14, 2023.  I reviewed the patient films myself.  I have compared his films to previous studies on January 10 as well as September 22, 2022.  Patient has continued progressive disease.  I went over pictures with him and his family.  B.  At this point I will switch him to Avastin IV once every 2 weeks.  C.  I did go over potential treatment complications including bleeding, thrombosis impaired wound healing and perforation.  D.  I discussed the case my research coordinator the patient might be a candidate for available clinical trial looking at diffusion based MRIs protocol number EEA F151 however he has to have 25% and document increase on his MRI which I do not think patient qualify for that.  E.  We will plan start treatment next week.  F.  I will monitor his blood  work as well as his urine analysis.  G.  He will follow-up with us for cycle #2    2.  Brain edema:  A.  I will start weaning him off steroids early next week.  B.  Currently he is on Decadron 6 mg 4 times a day.  Starting Monday I will go down to 4 mg 4 times a day with 2 mg taper every 3 days.    3.  Seizure prophylaxis:  A.  He is off Keppra currently. He denies overt seizure activity but is having increased tremor.     4.  Hypertension:  A.  I will continue lisinopril 20 mg daily. He has been taken off his Norvasc by his PCP secondary to low blood pressure.     5. Constipation:  A.  We will continue Colace twice a day as well as Miralax daily for narcotic induced constipation. He was given a prescription for Colace today.     FOLLOW UP: 3 weeks for cycle #2.    Total time of patient care including preparation of patient chart prior to arrival, face-to-face with patient and following visit spent in reviewing records, lab results, personally reviewing and interpreting imaging studies, discussion with patient, question with Dr. Beach over the phone.  And documentation/charting was 40 minutes       Shaan Rader MD  2/17/2023

## 2023-02-20 ENCOUNTER — HOSPITAL ENCOUNTER (INPATIENT)
Facility: HOSPITAL | Age: 53
LOS: 1 days | Discharge: HOSPICE | DRG: 951 | End: 2023-02-21
Attending: EMERGENCY MEDICINE | Admitting: INTERNAL MEDICINE
Payer: OTHER GOVERNMENT

## 2023-02-20 DIAGNOSIS — C71.9 GLIOBLASTOMA MULTIFORME: Primary | ICD-10-CM

## 2023-02-20 DIAGNOSIS — R51.9 ACUTE INTRACTABLE HEADACHE, UNSPECIFIED HEADACHE TYPE: ICD-10-CM

## 2023-02-20 DIAGNOSIS — Z51.5 ADMISSION FOR END OF LIFE CARE: ICD-10-CM

## 2023-02-20 PROCEDURE — 99285 EMERGENCY DEPT VISIT HI MDM: CPT

## 2023-02-20 NOTE — LETTER
[facility]  [address] [address]  [phone]        February 21, 2023      Patient: Nick Hancock  YOB: 1970  Date of Visit: 2/20/2023      For admission into inpatient hospice at [facility]  Referring Dr. Barajas  Certifying Dr. MIKAL Fonseca  Attending ZAIRE Lassiter, RN  [phone]

## 2023-02-20 NOTE — LETTER
[facility]  [address] [address]  [phone]        February 21, 2023      Patient: Nick Pachecodanellenathalia  YOB: 1970  Date of Visit: 2/20/2023      ***        Manjula Lassiter RN

## 2023-02-21 VITALS
RESPIRATION RATE: 18 BRPM | HEART RATE: 54 BPM | WEIGHT: 207 LBS | SYSTOLIC BLOOD PRESSURE: 124 MMHG | HEIGHT: 72 IN | DIASTOLIC BLOOD PRESSURE: 96 MMHG | BODY MASS INDEX: 28.04 KG/M2 | TEMPERATURE: 97.7 F | OXYGEN SATURATION: 91 %

## 2023-02-21 PROBLEM — R51.9 INTRACTABLE HEADACHE: Status: ACTIVE | Noted: 2023-01-01

## 2023-02-21 PROBLEM — C71.9 GLIOBLASTOMA MULTIFORME: Status: ACTIVE | Noted: 2023-01-01

## 2023-02-21 PROBLEM — R11.2 NAUSEA AND VOMITING: Status: ACTIVE | Noted: 2023-02-21

## 2023-02-21 LAB
BASOPHILS # BLD AUTO: 0.07 10*3/MM3 (ref 0–0.2)
BASOPHILS NFR BLD AUTO: 0.5 % (ref 0–1.5)
BILIRUB UR QL STRIP: NEGATIVE
CLARITY UR: CLEAR
COLOR UR: YELLOW
DEPRECATED RDW RBC AUTO: 37.1 FL (ref 37–54)
EOSINOPHIL # BLD AUTO: 0 10*3/MM3 (ref 0–0.4)
EOSINOPHIL NFR BLD AUTO: 0 % (ref 0.3–6.2)
ERYTHROCYTE [DISTWIDTH] IN BLOOD BY AUTOMATED COUNT: 12.3 % (ref 12.3–15.4)
GLUCOSE UR STRIP-MCNC: ABNORMAL MG/DL
HCT VFR BLD AUTO: 39.5 % (ref 37.5–51)
HGB BLD-MCNC: 14.3 G/DL (ref 13–17.7)
HGB UR QL STRIP.AUTO: NEGATIVE
IMM GRANULOCYTES # BLD AUTO: 0.63 10*3/MM3 (ref 0–0.05)
IMM GRANULOCYTES NFR BLD AUTO: 4.6 % (ref 0–0.5)
KETONES UR QL STRIP: ABNORMAL
LEUKOCYTE ESTERASE UR QL STRIP.AUTO: NEGATIVE
LYMPHOCYTES # BLD AUTO: 1.75 10*3/MM3 (ref 0.7–3.1)
LYMPHOCYTES NFR BLD AUTO: 12.7 % (ref 19.6–45.3)
MCH RBC QN AUTO: 30.5 PG (ref 26.6–33)
MCHC RBC AUTO-ENTMCNC: 36.2 G/DL (ref 31.5–35.7)
MCV RBC AUTO: 84.2 FL (ref 79–97)
MONOCYTES # BLD AUTO: 1.18 10*3/MM3 (ref 0.1–0.9)
MONOCYTES NFR BLD AUTO: 8.5 % (ref 5–12)
NEUTROPHILS NFR BLD AUTO: 10.2 10*3/MM3 (ref 1.7–7)
NEUTROPHILS NFR BLD AUTO: 73.7 % (ref 42.7–76)
NITRITE UR QL STRIP: NEGATIVE
NRBC BLD AUTO-RTO: 0 /100 WBC (ref 0–0.2)
PH UR STRIP.AUTO: 6 [PH] (ref 5–8)
PLATELET # BLD AUTO: 219 10*3/MM3 (ref 140–450)
PMV BLD AUTO: 9.7 FL (ref 6–12)
PROT UR QL STRIP: NEGATIVE
RBC # BLD AUTO: 4.69 10*6/MM3 (ref 4.14–5.8)
SP GR UR STRIP: 1.04 (ref 1–1.03)
UROBILINOGEN UR QL STRIP: ABNORMAL
WBC NRBC COR # BLD: 13.83 10*3/MM3 (ref 3.4–10.8)

## 2023-02-21 PROCEDURE — 25010000002 DEXAMETHASONE SODIUM PHOSPHATE 100 MG/10ML SOLUTION: Performed by: EMERGENCY MEDICINE

## 2023-02-21 PROCEDURE — 25010000002 DEXAMETHASONE PER 1 MG: Performed by: INTERNAL MEDICINE

## 2023-02-21 PROCEDURE — 36415 COLL VENOUS BLD VENIPUNCTURE: CPT

## 2023-02-21 PROCEDURE — 99254 IP/OBS CNSLTJ NEW/EST MOD 60: CPT | Performed by: INTERNAL MEDICINE

## 2023-02-21 PROCEDURE — 81003 URINALYSIS AUTO W/O SCOPE: CPT | Performed by: EMERGENCY MEDICINE

## 2023-02-21 PROCEDURE — 99223 1ST HOSP IP/OBS HIGH 75: CPT | Performed by: INTERNAL MEDICINE

## 2023-02-21 PROCEDURE — 25010000002 LEVETIRACETAM IN NACL 0.82% 500 MG/100ML SOLUTION: Performed by: INTERNAL MEDICINE

## 2023-02-21 PROCEDURE — 25010000002 ONDANSETRON PER 1 MG: Performed by: EMERGENCY MEDICINE

## 2023-02-21 PROCEDURE — 85025 COMPLETE CBC W/AUTO DIFF WBC: CPT | Performed by: EMERGENCY MEDICINE

## 2023-02-21 PROCEDURE — 25010000002 HYDROMORPHONE PER 4 MG: Performed by: INTERNAL MEDICINE

## 2023-02-21 PROCEDURE — 25010000002 HYDROMORPHONE PER 4 MG: Performed by: EMERGENCY MEDICINE

## 2023-02-21 RX ORDER — LORAZEPAM 0.5 MG/1
0.5 TABLET ORAL
Status: DISCONTINUED | OUTPATIENT
Start: 2023-02-21 | End: 2023-02-21 | Stop reason: HOSPADM

## 2023-02-21 RX ORDER — FENTANYL 25 UG/H
1 PATCH TRANSDERMAL
Status: DISCONTINUED | OUTPATIENT
Start: 2023-02-21 | End: 2023-02-21 | Stop reason: HOSPADM

## 2023-02-21 RX ORDER — HALOPERIDOL 1 MG/1
1 TABLET ORAL EVERY 4 HOURS PRN
Status: DISCONTINUED | OUTPATIENT
Start: 2023-02-21 | End: 2023-02-21 | Stop reason: HOSPADM

## 2023-02-21 RX ORDER — ACETAMINOPHEN 650 MG/1
650 SUPPOSITORY RECTAL EVERY 4 HOURS PRN
Status: DISCONTINUED | OUTPATIENT
Start: 2023-02-21 | End: 2023-02-21 | Stop reason: HOSPADM

## 2023-02-21 RX ORDER — DEXAMETHASONE SODIUM PHOSPHATE 4 MG/ML
4 INJECTION, SOLUTION INTRA-ARTICULAR; INTRALESIONAL; INTRAMUSCULAR; INTRAVENOUS; SOFT TISSUE EVERY 6 HOURS SCHEDULED
Status: DISCONTINUED | OUTPATIENT
Start: 2023-02-21 | End: 2023-02-21 | Stop reason: HOSPADM

## 2023-02-21 RX ORDER — LORAZEPAM 2 MG/ML
1 INJECTION INTRAMUSCULAR
Status: DISCONTINUED | OUTPATIENT
Start: 2023-02-21 | End: 2023-02-21 | Stop reason: HOSPADM

## 2023-02-21 RX ORDER — HYDROMORPHONE HCL 110MG/55ML
2 PATIENT CONTROLLED ANALGESIA SYRINGE INTRAVENOUS
Status: DISCONTINUED | OUTPATIENT
Start: 2023-02-21 | End: 2023-02-21 | Stop reason: HOSPADM

## 2023-02-21 RX ORDER — LORAZEPAM 2 MG/ML
1 CONCENTRATE ORAL
Status: DISCONTINUED | OUTPATIENT
Start: 2023-02-21 | End: 2023-02-21 | Stop reason: HOSPADM

## 2023-02-21 RX ORDER — LORAZEPAM 2 MG/ML
0.5 INJECTION INTRAMUSCULAR
Status: DISCONTINUED | OUTPATIENT
Start: 2023-02-21 | End: 2023-02-21 | Stop reason: HOSPADM

## 2023-02-21 RX ORDER — PROMETHAZINE HYDROCHLORIDE 6.25 MG/5ML
25 SYRUP ORAL EVERY 4 HOURS PRN
Status: DISCONTINUED | OUTPATIENT
Start: 2023-02-21 | End: 2023-02-21 | Stop reason: HOSPADM

## 2023-02-21 RX ORDER — LORAZEPAM 2 MG/ML
2 INJECTION INTRAMUSCULAR
Status: DISCONTINUED | OUTPATIENT
Start: 2023-02-21 | End: 2023-02-21 | Stop reason: HOSPADM

## 2023-02-21 RX ORDER — ONDANSETRON 4 MG/1
4 TABLET, FILM COATED ORAL EVERY 6 HOURS PRN
Status: DISCONTINUED | OUTPATIENT
Start: 2023-02-21 | End: 2023-02-21 | Stop reason: HOSPADM

## 2023-02-21 RX ORDER — ACETAMINOPHEN 160 MG/5ML
650 SOLUTION ORAL EVERY 4 HOURS PRN
Status: DISCONTINUED | OUTPATIENT
Start: 2023-02-21 | End: 2023-02-21 | Stop reason: HOSPADM

## 2023-02-21 RX ORDER — DIPHENOXYLATE HYDROCHLORIDE AND ATROPINE SULFATE 2.5; .025 MG/1; MG/1
1 TABLET ORAL
Status: DISCONTINUED | OUTPATIENT
Start: 2023-02-21 | End: 2023-02-21 | Stop reason: HOSPADM

## 2023-02-21 RX ORDER — LORAZEPAM 2 MG/ML
2 CONCENTRATE ORAL
Status: DISCONTINUED | OUTPATIENT
Start: 2023-02-21 | End: 2023-02-21 | Stop reason: HOSPADM

## 2023-02-21 RX ORDER — ONDANSETRON 2 MG/ML
4 INJECTION INTRAMUSCULAR; INTRAVENOUS ONCE
Status: COMPLETED | OUTPATIENT
Start: 2023-02-21 | End: 2023-02-21

## 2023-02-21 RX ORDER — LEVETIRACETAM 5 MG/ML
500 INJECTION INTRAVASCULAR EVERY 12 HOURS SCHEDULED
Status: DISCONTINUED | OUTPATIENT
Start: 2023-02-21 | End: 2023-02-21 | Stop reason: HOSPADM

## 2023-02-21 RX ORDER — LORAZEPAM 2 MG/ML
0.5 CONCENTRATE ORAL
Status: DISCONTINUED | OUTPATIENT
Start: 2023-02-21 | End: 2023-02-21 | Stop reason: HOSPADM

## 2023-02-21 RX ORDER — HALOPERIDOL 2 MG/ML
1 SOLUTION ORAL EVERY 4 HOURS PRN
Status: DISCONTINUED | OUTPATIENT
Start: 2023-02-21 | End: 2023-02-21 | Stop reason: HOSPADM

## 2023-02-21 RX ORDER — PROMETHAZINE HYDROCHLORIDE 25 MG/1
25 SUPPOSITORY RECTAL EVERY 4 HOURS PRN
Status: DISCONTINUED | OUTPATIENT
Start: 2023-02-21 | End: 2023-02-21 | Stop reason: HOSPADM

## 2023-02-21 RX ORDER — ACETAMINOPHEN 325 MG/1
650 TABLET ORAL EVERY 4 HOURS PRN
Status: DISCONTINUED | OUTPATIENT
Start: 2023-02-21 | End: 2023-02-21 | Stop reason: HOSPADM

## 2023-02-21 RX ORDER — MORPHINE SULFATE 10 MG/5ML
5 SOLUTION ORAL
Qty: 60 ML | Refills: 0 | Status: SHIPPED | OUTPATIENT
Start: 2023-02-21 | End: 2023-04-21 | Stop reason: SDUPTHER

## 2023-02-21 RX ORDER — HYDROMORPHONE HYDROCHLORIDE 1 MG/ML
0.5 INJECTION, SOLUTION INTRAMUSCULAR; INTRAVENOUS; SUBCUTANEOUS ONCE
Status: COMPLETED | OUTPATIENT
Start: 2023-02-21 | End: 2023-02-21

## 2023-02-21 RX ORDER — LORAZEPAM 1 MG/1
1 TABLET ORAL
Status: DISCONTINUED | OUTPATIENT
Start: 2023-02-21 | End: 2023-02-21 | Stop reason: HOSPADM

## 2023-02-21 RX ORDER — LORAZEPAM 2 MG/ML
2 CONCENTRATE ORAL
Qty: 30 ML | Refills: 0 | Status: SHIPPED | OUTPATIENT
Start: 2023-02-21

## 2023-02-21 RX ORDER — LORAZEPAM 1 MG/1
2 TABLET ORAL
Status: DISCONTINUED | OUTPATIENT
Start: 2023-02-21 | End: 2023-02-21 | Stop reason: HOSPADM

## 2023-02-21 RX ORDER — MORPHINE SULFATE/PF 1 MG/2 ML
5 SYRINGE (ML) INTRAVENOUS
Qty: 120 ML | Refills: 0 | Status: SHIPPED | OUTPATIENT
Start: 2023-02-21 | End: 2023-02-21 | Stop reason: HOSPADM

## 2023-02-21 RX ORDER — ONDANSETRON 2 MG/ML
4 INJECTION INTRAMUSCULAR; INTRAVENOUS EVERY 6 HOURS PRN
Status: DISCONTINUED | OUTPATIENT
Start: 2023-02-21 | End: 2023-02-21 | Stop reason: HOSPADM

## 2023-02-21 RX ORDER — PROMETHAZINE HYDROCHLORIDE 25 MG/1
25 TABLET ORAL EVERY 4 HOURS PRN
Status: DISCONTINUED | OUTPATIENT
Start: 2023-02-21 | End: 2023-02-21 | Stop reason: HOSPADM

## 2023-02-21 RX ORDER — HALOPERIDOL 5 MG/ML
1 INJECTION INTRAMUSCULAR EVERY 4 HOURS PRN
Status: DISCONTINUED | OUTPATIENT
Start: 2023-02-21 | End: 2023-02-21 | Stop reason: HOSPADM

## 2023-02-21 RX ADMIN — DEXAMETHASONE SODIUM PHOSPHATE 4 MG: 4 INJECTION INTRA-ARTICULAR; INTRALESIONAL; INTRAMUSCULAR; INTRAVENOUS; SOFT TISSUE at 14:53

## 2023-02-21 RX ADMIN — HYDROMORPHONE HYDROCHLORIDE 2 MG: 2 INJECTION, SOLUTION INTRAMUSCULAR; INTRAVENOUS; SUBCUTANEOUS at 09:00

## 2023-02-21 RX ADMIN — DEXAMETHASONE SODIUM PHOSPHATE 10 MG: 10 INJECTION, SOLUTION INTRAMUSCULAR; INTRAVENOUS at 00:41

## 2023-02-21 RX ADMIN — LEVETIRACETAM 500 MG: 5 INJECTION INTRAVENOUS at 09:21

## 2023-02-21 RX ADMIN — HYDROMORPHONE HYDROCHLORIDE 0.5 MG: 1 INJECTION, SOLUTION INTRAMUSCULAR; INTRAVENOUS; SUBCUTANEOUS at 00:37

## 2023-02-21 RX ADMIN — DEXAMETHASONE SODIUM PHOSPHATE 4 MG: 4 INJECTION INTRA-ARTICULAR; INTRALESIONAL; INTRAMUSCULAR; INTRAVENOUS; SOFT TISSUE at 05:25

## 2023-02-21 RX ADMIN — FENTANYL 1 PATCH: 25 PATCH TRANSDERMAL at 16:36

## 2023-02-21 RX ADMIN — ONDANSETRON 4 MG: 2 INJECTION INTRAMUSCULAR; INTRAVENOUS at 00:37

## 2023-02-21 NOTE — PLAN OF CARE
Problem: Fall Injury Risk  Goal: Absence of Fall and Fall-Related Injury  Intervention: Identify and Manage Contributors  Recent Flowsheet Documentation  Taken 2/21/2023 1200 by Stanislav Bailey RN  Medication Review/Management: medications reviewed  Intervention: Promote Injury-Free Environment  Recent Flowsheet Documentation  Taken 2/21/2023 1200 by Stanislav Bailey RN  Safety Promotion/Fall Prevention:   toileting scheduled   safety round/check completed   room organization consistent   activity supervised   assistive device/personal items within reach   clutter free environment maintained   fall prevention program maintained   mobility aid in reach  Taken 2/21/2023 1000 by Stanislav Bailey RN  Safety Promotion/Fall Prevention:   activity supervised   clutter free environment maintained   assistive device/personal items within reach   toileting scheduled   safety round/check completed   room organization consistent   nonskid shoes/slippers when out of bed   gait belt   fall prevention program maintained   Goal Outcome Evaluation:

## 2023-02-21 NOTE — DISCHARGE PLACEMENT REQUEST
"Clary Maravilla (52 y.o. Male)     Date of Birth   1970    Social Security Number       Address   [address] [place] [address]    Home Phone   [phone]    MRN   [id number]       Bahai   Faith    Marital Status                               Admission Date   2/20/23    Admission Type   Emergency    Admitting Provider   Tammi Barajas DO    Attending Provider   Tammi Barajas DO    Department, Room/Bed   [address], [address]       Discharge Date       Discharge Disposition       Discharge Destination                               Attending Provider: Tammi Barajas DO    Allergies: No Known Allergies    Isolation: None   Infection: None   Code Status: No CPR    Ht: 182.9 cm (72\")   Wt: 93.9 kg (207 lb)    Admission Cmt: None   Principal Problem: Glioblastoma multiforme (HCC) [C71.9]                 Active Insurance as of 2/20/2023     Primary Coverage     Payor Plan Insurance Group Employer/Plan Group    [facility] [id number]     Payor Plan Address Payor Plan Phone Number Payor Plan Fax Number Effective Dates    [address] [phone]  3/16/2020 - None Entered    [facility] [id number]       Subscriber Name Subscriber Birth Date Member ID       CLARY MARAVILLA 1970 [id number]                 Emergency Contacts      (Rel.) Home Phone Work Phone Mobile Phone    malinda candelario (Spouse) [phone] -- [phone]    Manjula Sanford (Sister) -- -- [phone]            Emergency Contact Information     Name Relation Home Work Mobile    candelariomalinda Spouse [phone]    Manjula Sanford Sister   [phone]          Insurance Information                [facility] Phone: [phone]    Subscriber: Clary Maravilla Subscriber#: [id number]    Group#: [id number] Precert#: --             History & Physical      Day, Martine GAMINO MD at 02/21/23 [facility]9              [facility] " "Medicine Services  HISTORY AND PHYSICAL    Patient Name: Nick Hancock  : 1970  MRN: [id number]  Primary Care Physician: Alicia Devlin PA  Date of admission: 2023      Subjective   Subjective     Chief Complaint:   headache, n/v    HPI:  Nick Hancock is a 52 y.o. male with hx of glioblastoma multiforme s/p craniotomy 22 followed by chemotherapy and radiation who presents now w/ intractable headache, n/v.  Hx is currently provided by sister-in-law at bedside.  KEYANA also notes that pt's leg has \"tremor\" that has not been present before.  Dr. Rader has discussed with ER and recommends admission for end of life care.      Review of Systems    unable to obtain     Personal History     Past Medical History:   Diagnosis Date   • Diverticulitis    • High triglycerides    • Hyperlipidemia    • Hypertension    • Intractable headache 2023   • Skin cancer    • Sleep apnea          Oncology Problem List:  Glioblastoma multiforme (HCC) (2023; Status: Active)  Glioblastoma (HCC) (2022; Status: Active)    Oncology/Hematology History   Glioblastoma (HCC)   2022 Initial Diagnosis    Glioblastoma (HCC)     10/18/2022 - 10/18/2022 Chemotherapy    OP CNS Temozolomide + XRT     10/24/2022 - 2022 Radiation    Radiation OncologyTreatment Course:  Nick Hancock received 6000 cGy in 30 fractions to GBM via External Beam Radiation - EBRT.     2023 - 2023 Chemotherapy    OP CNS Temozolomide--Days 1-5     2023 -  Chemotherapy    OP SUPPORTIVE HYDRATION + ANTIEMETICS     2023 -  Chemotherapy    OP CNS Bevacizumab 10mg/kg         Past Surgical History:   Procedure Laterality Date   • COLONOSCOPY     • CRANIOTOMY  2022   • CRANIOTOMY FOR TUMOR N/A 2022    Procedure: CRANIOTOMY FOR TUMOR;  Surgeon: Umair Patton MD;  [facility];  Service: Neurosurgery;  Laterality: N/A;   • EYE SURGERY Bilateral     PRK   • SKIN SURGERY      " removed abnormal cells       Family History:   family history includes Cancer in his mother; Heart attack in his mother; Hyperlipidemia in his father; Hypertension in his brother; Melanoma in his mother; Stroke in his father. Otherwise pertinent FHx was reviewed and unremarkable.     Social History:  reports that he has quit smoking. His smoking use included cigarettes. His smokeless tobacco use includes snuff. He reports that he does not currently use alcohol. He reports that he does not use drugs.  Social History     Social History Narrative   • Not on file       Medications:  Available home medication information reviewed.  HYDROcodone-acetaminophen, amLODIPine, butalbital-acetaminophen-caffeine, dexamethasone, docusate sodium, fluconazole, icosapent ethyl, levETIRAcetam, lisinopril, niacin, nystatin, and ondansetron      No Known Allergies    Objective   Objective     Vital Signs:   Temp:  [97.1 °F (36.2 °C)] 97.1 °F (36.2 °C)  Heart Rate:  [58] 58  Resp:  [13] 13  BP: (140)/(98) 140/98       Physical Exam   Gen; alert, confused  Heent; perrla, eomi, mmm  Cv; rrr, no mrg  L; ctab, no wheeze/crackles  Abd; soft, +bs, ntnd, no r/g  Ext; no cce, palpable pulses  Skin; cdi, warm   Neuro; MAEE; 5/5 , sensation intact; no tremor noted currently  Psych; pleasantly confused      Result Review:  I have personally reviewed the results from the time of this admission to 2/21/2023 01:45 EST and agree with these findings:  [x]  Laboratory list / accordion  []  Microbiology  [x]  Radiology  []  EKG/Telemetry   []  Cardiology/Vascular   []  Pathology  []  Old records  []  Other:  Most notable findings include:          LAB RESULTS:      Lab 02/21/23  0034   WBC 13.83*   HEMOGLOBIN 14.3   HEMATOCRIT 39.5   PLATELETS 219   NEUTROS ABS 10.20*   IMMATURE GRANS (ABS) 0.63*   LYMPHS ABS 1.75   MONOS ABS 1.18*   EOS ABS 0.00   MCV 84.2                                 Microbiology Results (last 10 days)     ** No results found for  the last 240 hours. **          No radiology results from the last 24 hrs        Assessment & Plan   Assessment & Plan     Active Hospital Problems    Diagnosis  POA   • **Glioblastoma multiforme (HCC) [C71.9]  Yes   • Intractable headache [R51.9]  Yes   • Nausea and vomiting [R11.2]  Yes     51 y/o male w/ hx of GBM s/p resection 9/2022, s/p chemo and radiation here now w/  1. GBM w/ intractable headache/n/v  -last MRI 2/14 with vasogenic edema surrounding known left frontal lobe GBM  -will admit with hospice order set and officially consult hospice this a.m. as per 's discussion with Dr. Carlson  -consult to Dr. Rader in a.m.  -will change po Keppra to IV  -continue IV decadron   -have discussed plan in depth with sister-in-law at bedside  - reports that he would like to have pt at home with  Hospice ultimately if this is possible         DVT prophylaxis:         CODE STATUS:     Code Status and Medical Interventions:   Ordered at: 02/21/23 0138     Level Of Support Discussed With:    Health Care Surrogate     Code Status (Patient has no pulse and is not breathing):    No CPR (Do Not Attempt to Resuscitate)     Medical Interventions (Patient has pulse or is breathing):    Comfort Measures       Expected Discharge  TBD      INPATIENT status due to  GBM with vasogenic edema.  I feel patient’s risk for adverse outcomes and need for care warrant INPATIENT evaluation and I predict the patient’s care encounter to likely last beyond 2 midnights.    Martine Lee MD  02/21/23  Electronically signed by Martine Lee MD, 02/21/23, 1:50 AM EST.      Electronically signed by Martine Lee MD at 02/21/23 0150

## 2023-02-21 NOTE — CONSULTS
Subjective     CHIEF COMPLAINT: Headaches    HISTORY OF PRESENT ILLNESS:  The patient is a 52 y.o. male, referred by Tammi Barajas DO for aggressive left frontal lobe GBM.  The patient had progressed despite surgery followed by concurrent Temodar with radiation.  The plan was to proceed with Avastin after his most recent MRI brain done 2/14/2023 showing progressive disease however the patient clinical condition continued to deteriorate.  Yesterday he was having severe headaches, 10 out of 10, nothing is helping him at home.  The family could not care for him.  The patient was brought to [facility] emergency room.  He was admitted to hospitalist service and I was consulted to further assist in his care.      REVIEW OF SYSTEMS:  A 14 point review of systems was performed and is negative except as noted above.    Past Medical History:   Diagnosis Date   • Diverticulitis    • High triglycerides    • Hyperlipidemia    • Hypertension    • Intractable headache 2/21/2023   • Skin cancer    • Sleep apnea        No current facility-administered medications on file prior to encounter.     Current Outpatient Medications on File Prior to Encounter   Medication Sig Dispense Refill   • amLODIPine (NORVASC) 5 MG tablet Take 5 mg by mouth Daily.     • butalbital-acetaminophen-caffeine (FIORICET, ESGIC) -40 MG per tablet Take 1 tablet by mouth Every 6 (Six) Hours As Needed for Headache. 12 tablet 0   • dexamethasone (DECADRON) 2 MG tablet Starting 2/20/23, Take 4mg 4 x day for 3 days, Take 2mg 4 x day for 3 days, Take 2mg 3 x day for 3 days, Take 2mg 2 x day for 3 days, Take 2mg 1 x day for 3 days, then stop.  Take with food. 54 tablet 0   • docusate sodium (COLACE) 100 MG capsule Take 1 capsule by mouth Daily. Two capusles 60 capsule 3   • fluconazole (Diflucan) 100 MG tablet Take 1 tablet by mouth Daily. 5 tablet 0   • HYDROcodone-acetaminophen (NORCO) 5-325 MG per tablet Take 1 tablet by mouth Every 6  (Six) Hours As Needed for Moderate Pain. 120 tablet 0   • HYDROcodone-acetaminophen (NORCO) 5-325 MG per tablet Take 1 tablet by mouth Every 6 (Six) Hours As Needed.     • levETIRAcetam (Keppra) 500 MG tablet Take 1 tablet by mouth 2 (Two) Times a Day. 60 tablet 0   • lisinopril (PRINIVIL,ZESTRIL) 20 MG tablet Take 20 mg by mouth Daily.     • niacin 500 MG tablet Take 500 mg by mouth Every Night.     • nystatin (MYCOSTATIN) 100,000 unit/mL suspension Swish and swallow 5 mL 4 (Four) Times a Day. 473 mL 1   • ondansetron (ZOFRAN) 8 MG tablet Take 1 tablet by mouth 3 (Three) Times a Day As Needed for Nausea or Vomiting. 30 tablet 5   • ondansetron (ZOFRAN) 8 MG tablet Take 1 tablet by mouth 3 (Three) Times a Day As Needed for Nausea or Vomiting. 30 tablet 5   • ondansetron (ZOFRAN) 8 MG tablet Take 1 tablet by mouth 3 (Three) Times a Day As Needed for Nausea or Vomiting. 30 tablet 5   • VASCEPA 1 g capsule capsule          No Known Allergies    Past Surgical History:   Procedure Laterality Date   • COLONOSCOPY     • CRANIOTOMY  08/21/2022   • CRANIOTOMY FOR TUMOR N/A 09/21/2022    Procedure: CRANIOTOMY FOR TUMOR;  Surgeon: Umair Patton MD;  Location: [facility];  Service: Neurosurgery;  Laterality: N/A;   • EYE SURGERY Bilateral 2008    PRK   • SKIN SURGERY  2010    removed abnormal cells       OB History   No obstetric history on file.       Social History     Socioeconomic History   • Marital status:    Tobacco Use   • Smoking status: Former     Packs/day: 0.00     Years: 10.00     Pack years: 0.00     Types: Cigarettes   • Smokeless tobacco: Current     Types: Snuff   Vaping Use   • Vaping Use: Never used   Substance and Sexual Activity   • Alcohol use: Not Currently     Comment: Socially   • Drug use: Never   • Sexual activity: Yes     Partners: Male     Birth control/protection: None, Same-sex partner       Family History   Problem Relation Age of Onset   • Cancer Mother    • Heart attack Mother    •  Melanoma Mother    • Hyperlipidemia Father    • Stroke Father    • Hypertension Brother    • Colon cancer Neg Hx        Objective     Vitals:    02/21/23 0327 02/21/23 0419 02/21/23 0712 02/21/23 1109   BP:  149/98 143/98 124/96   BP Location:  Left arm Left arm Right arm   Patient Position:  Lying Lying Lying   Pulse: 52 57 62 54   Resp:  16 18 18   Temp:  97.9 °F (36.6 °C) 97.8 °F (36.6 °C) 97.7 °F (36.5 °C)   TempSrc:  Oral Oral Oral   SpO2: 93% 97% 92% 91%   Weight:       Height:                            ECOG Performance Status: 2 - Symptomatic, <50% confined to bed  General: well appearing male in no acute distress  Neuro/Psych: A&O x 3, gait steady, appropriate affect, strength 5/5 in all muscle groups  HEENT: sclerae anicteric, oropharynx clear  Lymphatics: no cervical, supraclavicular, or axillary adenopathy  Cardiovascular: regular rate and rhythm, no murmurs  Lungs: clear to auscultation bilaterally  Abdomen: soft, nontender, nondistended.  No palpable organomegaly  Extremities: no lower extremity edema  Skin: no rashes, lesions, bruising, or petechiae      Admission on 02/20/2023   Component Date Value Ref Range Status   • WBC 02/21/2023 13.83 (H)  3.40 - 10.80 10*3/mm3 Final   • RBC 02/21/2023 4.69  4.14 - 5.80 10*6/mm3 Final   • Hemoglobin 02/21/2023 14.3  13.0 - 17.7 g/dL Final   • Hematocrit 02/21/2023 39.5  37.5 - 51.0 % Final   • MCV 02/21/2023 84.2  79.0 - 97.0 fL Final   • MCH 02/21/2023 30.5  26.6 - 33.0 pg Final   • MCHC 02/21/2023 36.2 (H)  31.5 - 35.7 g/dL Final   • RDW 02/21/2023 12.3  12.3 - 15.4 % Final   • RDW-SD 02/21/2023 37.1  37.0 - 54.0 fl Final   • MPV 02/21/2023 9.7  6.0 - 12.0 fL Final   • Platelets 02/21/2023 219  140 - 450 10*3/mm3 Final   • Neutrophil % 02/21/2023 73.7  42.7 - 76.0 % Final   • Lymphocyte % 02/21/2023 12.7 (L)  19.6 - 45.3 % Final   • Monocyte % 02/21/2023 8.5  5.0 - 12.0 % Final   • Eosinophil % 02/21/2023 0.0 (L)  0.3 - 6.2 % Final   • Basophil % 02/21/2023  0.5  0.0 - 1.5 % Final   • Immature Grans % 02/21/2023 4.6 (H)  0.0 - 0.5 % Final   • Neutrophils, Absolute 02/21/2023 10.20 (H)  1.70 - 7.00 10*3/mm3 Final   • Lymphocytes, Absolute 02/21/2023 1.75  0.70 - 3.10 10*3/mm3 Final   • Monocytes, Absolute 02/21/2023 1.18 (H)  0.10 - 0.90 10*3/mm3 Final   • Eosinophils, Absolute 02/21/2023 0.00  0.00 - 0.40 10*3/mm3 Final   • Basophils, Absolute 02/21/2023 0.07  0.00 - 0.20 10*3/mm3 Final   • Immature Grans, Absolute 02/21/2023 0.63 (H)  0.00 - 0.05 10*3/mm3 Final   • nRBC 02/21/2023 0.0  0.0 - 0.2 /100 WBC Final   • Color, UA 02/21/2023 Yellow  Yellow, Straw Final   • Appearance, UA 02/21/2023 Clear  Clear Final   • pH, UA 02/21/2023 6.0  5.0 - 8.0 Final   • Specific Gravity, UA 02/21/2023 1.038 (H)  1.001 - 1.030 Final   • Glucose, UA 02/21/2023 >=1000 mg/dL (3+) (A)  Negative Final   • Ketones, UA 02/21/2023 15 mg/dL (1+) (A)  Negative Final   • Bilirubin, UA 02/21/2023 Negative  Negative Final   • Blood, UA 02/21/2023 Negative  Negative Final   • Protein, UA 02/21/2023 Negative  Negative Final   • Leuk Esterase, UA 02/21/2023 Negative  Negative Final   • Nitrite, UA 02/21/2023 Negative  Negative Final   • Urobilinogen, UA 02/21/2023 1.0 E.U./dL  0.2 - 1.0 E.U./dL Final        No results found.    ASSESSMENT 52 years old gentleman with progressive GBM      PLAN  1.  Progressive GBM:  A.  The patient and the family had made either my to move along with hospice care which I think is very reasonable given his primary refractory disease and worsening performance status.  B.  I will go and cancel his outpatient infusion with Avastin.  C.  I will be happy to follow him up with hospice as an out patient.    2.  Brain edema:  A.  Continue steroids    3.  Cancer related pain:  A.  Continue opiates as needed      Shaan Rader MD    2/21/2023

## 2023-02-21 NOTE — CONSULTS
Hospice consult received. Chart reviewed. Pt is outside of the [facility] service area & will be a pt of [facility]. Referral has been called to [facility]. Cierra will be making a BS visit later today. If further assistance is needed, please call [phone].

## 2023-02-21 NOTE — DISCHARGE PLACEMENT REQUEST
"Clary Maravilla (52 y.o. Male)   D/C Summary    Date of Birth   1970    Social Security Number       Address   [address] [place] [address]    Home Phone   [phone]    MRN   [id number]       Hoahaoism   Quaker    Marital Status                               Admission Date   23    Admission Type   Emergency    Admitting Provider   Tammi Barajas DO    Attending Provider   Tammi Barajas DO    Department, Room/Bed   [facility] 3H, S382/1       Discharge Date       Discharge Disposition   Hospice/Medical Facility (DC - External)    Discharge Destination                               Attending Provider: Tammi Barajas DO    Allergies: No Known Allergies    Isolation: None   Infection: None   Code Status: No CPR    Ht: 182.9 cm (72\")   Wt: 93.9 kg (207 lb)    Admission Cmt: None   Principal Problem: Glioblastoma multiforme (HCC) [C71.9]                 Active Insurance as of 2023     Primary Coverage     Payor Plan Insurance Group Employer/Plan Group    [facility]     Payor Plan Address Payor Plan Phone Number Payor Plan Fax Number Effective Dates    [address] [phone]  3/16/2020 - None Entered    [facility] [address]       Subscriber Name Subscriber Birth Date Member ID       CLARY MARAVILLA 1970 [id number]                 Emergency Contacts      (Rel.) Home Phone Work Phone Mobile Phone    malinda candelario (Spouse) [phone] -- [phone]    JuvenalManjula (Sister) -- -- [phone]                 Discharge Summary      Tammi Barajas DO at [facility]              [facility] Medicine Services  DISCHARGE SUMMARY    Patient Name: Clary Maravilla  : 1970  MRN: [id number]    Date of Admission: 2023 11:46 PM  Date of Discharge:  2023  Primary Care Physician: Alicia Devlin, PA    Consults     Date and Time Order Name Status Description    " 2/21/2023  2:45 AM Inpatient Hematology & Oncology Consult Completed           Hospital Course     Presenting Problem:   Glioblastoma multiforme (HCC) [C71.9]    Active Hospital Problems    Diagnosis  POA   • **Glioblastoma multiforme (HCC) [C71.9]  Yes   • Intractable headache [R51.9]  Yes   • Nausea and vomiting [R11.2]  Yes      Resolved Hospital Problems   No resolved problems to display.          Hospital Course:  Nick Hancock is a 52 y.o. male w/ hx of GBM s/p resection 9/2022, s/p chemo and radiation here now w/intractable headache and overall decline    GBM   -last MRI 2/14 with vasogenic edema surrounding known left frontal lobe GBM  -discharge home with hospice per family wishes, Dr. Rader in agreeance with plan  -oral morphine solution provided PRN as well as 25mcg fentanyl patch placed prior to discharge per hospice request     Discharge Follow Up Recommendations for outpatient labs/diagnostics:  - discharge home with hospice/family    Day of Discharge     HPI:   Patient resting in bed. Asking for some breakfast. Spouse at bedside plans home with hospice.    Review of Systems  UTO due to mental status    Vital Signs:   Temp:  [97.1 °F (36.2 °C)-97.9 °F (36.6 °C)] 97.7 °F (36.5 °C)  Heart Rate:  [51-74] 54  Resp:  [13-18] 18  BP: (124-149)/(80-98) 124/96      Physical Exam:  Constitutional: No acute distress  HENT: NCAT, mucous membranes moist  Respiratory: respiratory effort normal   Cardiovascular: RRR, no murmurs, rubs, or gallops  Gastrointestinal: soft, nontender, nondistended  Musculoskeletal: No bilateral ankle edema  Neurologic: confused with janel hallucinations  Skin: No rashes        Pertinent  and/or Most Recent Results     LAB RESULTS:      Lab 02/21/23  0034   WBC 13.83*   HEMOGLOBIN 14.3   HEMATOCRIT 39.5   PLATELETS 219   NEUTROS ABS 10.20*   IMMATURE GRANS (ABS) 0.63*   LYMPHS ABS 1.75   MONOS ABS 1.18*   EOS ABS 0.00   MCV 84.2                             Brief Urine Lab Results   (Last result in the past 365 days)      Color   Clarity   Blood   Leuk Est   Nitrite   Protein   CREAT   Urine HCG        02/21/23 0233 Yellow   Clear   Negative   Negative   Negative   Negative               Microbiology Results (last 10 days)     ** No results found for the last 240 hours. **          MRI Brain With & Without Contrast    Result Date: 2/14/2023  PROCEDURE: MRI BRAIN W WO CONTRAST-  HISTORY: glioblastoma; C71.9-Malignant neoplasm of brain, unspecified  COMPARISON: 01/10/2023  TECHNIQUE: Multiplanar imaging was performed of the brain with and without Gadolinium infusion.  FINDINGS: Diffusion sequences show evidence of acute infarct.  Large irregular enhancing lesion is seen centered in the left frontal lobe measuring 5.5 cm width x 6.7 cm AP x 4.2 cm height, previously 5.2 x 6.1 x 4.2 cm. There is irregular ring enhancement noted. There is overlying dural enhancement as well. Dural enhancement may be postoperative. Areas of necrosis and/or cystic degeneration are noted. The lesion does cross the midline through the corpus callosum into the right frontal lobe, similar from prior exam.  Significant edema is noted of the frontal lobes in a distribution similar from prior exam. Edema does extend into the basal ganglia, left much greater than right in a more pronounced fashion from prior exam.  Ventricles are normal. Small extra-axial fluid collection overlying left frontal lobe probably from prior craniotomy has improved. There is no midline shift. No acute hemorrhage is seen.      IMPRESSION : 1. Minimal enlargement of irregular ring-enhancing lesion centered in the left frontal lobe crossing midline to extend into the right frontal lobe compatible with known GBM. 2. Significant vasogenic edema associated with the lesion shows minimal increased extension into the basal ganglia, left greater than right. 3. Ventricles normal without midline shift. 4. No evidence of acute hemorrhage.  This report was  signed and finalized on 2/14/2023 11:24 AM by Kushal Escalante MD.                  Plan for Follow-up of Pending Labs/Results:     Discharge Details        Discharge Medications      New Medications      Instructions Start Date   LORazepam 2 MG/ML concentrated solution  Commonly known as: LORazepam Intensol   2 mg, Oral, Every 2 Hours PRN      Morphine 10 MG/5ML solution   5 mg, Oral, Every 3 Hours PRN         Continue These Medications      Instructions Start Date   butalbital-acetaminophen-caffeine -40 MG per tablet  Commonly known as: FIORICET, ESGIC   1 tablet, Oral, Every 6 Hours PRN      dexamethasone 2 MG tablet  Commonly known as: DECADRON   Starting 2/20/23, Take 4mg 4 x day for 3 days, Take 2mg 4 x day for 3 days, Take 2mg 3 x day for 3 days, Take 2mg 2 x day for 3 days, Take 2mg 1 x day for 3 days, then stop.  Take with food.      docusate sodium 100 MG capsule  Commonly known as: COLACE   100 mg, Oral, Daily, Two capusles      HYDROcodone-acetaminophen 5-325 MG per tablet  Commonly known as: NORCO   1 tablet, Oral, Every 6 Hours PRN      HYDROcodone-acetaminophen 5-325 MG per tablet  Commonly known as: NORCO   1 tablet, Oral, Every 6 Hours PRN      levETIRAcetam 500 MG tablet  Commonly known as: Keppra   500 mg, Oral, 2 Times Daily      niacin 500 MG tablet   500 mg, Oral, Nightly      nystatin 100,000 unit/mL suspension  Commonly known as: MYCOSTATIN   500,000 Units, Swish & Swallow, 4 Times Daily      ondansetron 8 MG tablet  Commonly known as: ZOFRAN   8 mg, Oral, 3 Times Daily PRN      ondansetron 8 MG tablet  Commonly known as: ZOFRAN   8 mg, Oral, 3 Times Daily PRN      ondansetron 8 MG tablet  Commonly known as: ZOFRAN   8 mg, Oral, 3 Times Daily PRN      Vascepa 1 g capsule capsule  Generic drug: icosapent ethyl   No dose, route, or frequency recorded.         Stop These Medications    amLODIPine 5 MG tablet  Commonly known as: NORVASC     fluconazole 100 MG tablet  Commonly known as:  Diflucan     lisinopril 20 MG tablet  Commonly known as: PRINIVIL,ZESTRIL            No Known Allergies      Discharge Disposition:  Hospice/Medical Facility (DC - External)    Diet:  Hospital:  Diet Order   Procedures   • Diet: Regular/House Diet; Texture: Regular Texture (IDDSI 7); Fluid Consistency: Thin (IDDSI 0)       Activity:  - as tolerated    Restrictions or Other Recommendations:  - none       CODE STATUS:    Code Status and Medical Interventions:   Ordered at: 02/21/23 0138     Level Of Support Discussed With:    Health Care Surrogate     Code Status (Patient has no pulse and is not breathing):    No CPR (Do Not Attempt to Resuscitate)     Medical Interventions (Patient has pulse or is breathing):    Comfort Measures       Future Appointments   Date Time Provider Department Center   3/8/2023 11:30 AM Shaan Rader MD [facility] ONC RICH [facility]   3/13/2023 10:00 AM [facility] MRI 1 BH [facility] MRI [facility]   3/13/2023  2:00 PM Umair Patton MD M[facility] NS [facility]   3/14/2023  1:15 PM Faiza Gomez MD [facility]                 Tammi Barajas DO  02/21/23      Time Spent on Discharge:  I spent  31 minutes on this discharge activity which included: face-to-face encounter with the patient, reviewing the data in the system, coordination of the care with the nursing staff as well as consultants, documentation, and entering orders.              Electronically signed by Tammi Barajas DO at 02/21/23 8424

## 2023-02-21 NOTE — DISCHARGE SUMMARY
[facility] Medicine Services  DISCHARGE SUMMARY    Patient Name: Nick Hancock  : 1970  MRN: [id number]    Date of Admission: 2023 11:46 PM  Date of Discharge:  2023  Primary Care Physician: Alicia Devlin PA    Consults     Date and Time Order Name Status Description    2023  2:45 AM Inpatient Hematology & Oncology Consult Completed           Hospital Course     Presenting Problem:   Glioblastoma multiforme (HCC) [C71.9]    Active Hospital Problems    Diagnosis  POA   • **Glioblastoma multiforme (HCC) [C71.9]  Yes   • Intractable headache [R51.9]  Yes   • Nausea and vomiting [R11.2]  Yes      Resolved Hospital Problems   No resolved problems to display.          Hospital Course:  Nick Hancock is a 52 y.o. male w/ hx of GBM s/p resection 2022, s/p chemo and radiation here now w/intractable headache and overall decline    GBM   -last MRI  with vasogenic edema surrounding known left frontal lobe GBM  -discharge home with hospice per family wishes, Dr. Rader in agreeance with plan  -oral morphine solution provided PRN as well as 25mcg fentanyl patch placed prior to discharge per hospice request     Discharge Follow Up Recommendations for outpatient labs/diagnostics:  - discharge home with hospice/family    Day of Discharge     HPI:   Patient resting in bed. Asking for some breakfast. Spouse at bedside plans home with hospice.    Review of Systems  UTO due to mental status    Vital Signs:   Temp:  [97.1 °F (36.2 °C)-97.9 °F (36.6 °C)] 97.7 °F (36.5 °C)  Heart Rate:  [51-74] 54  Resp:  [13-18] 18  BP: (124-149)/(80-98) 124/96      Physical Exam:  Constitutional: No acute distress  HENT: NCAT, mucous membranes moist  Respiratory: respiratory effort normal   Cardiovascular: RRR, no murmurs, rubs, or gallops  Gastrointestinal: soft, nontender, nondistended  Musculoskeletal: No bilateral ankle edema  Neurologic: confused with janel  hallucinations  Skin: No rashes        Pertinent  and/or Most Recent Results     LAB RESULTS:      Lab 02/21/23  0034   WBC 13.83*   HEMOGLOBIN 14.3   HEMATOCRIT 39.5   PLATELETS 219   NEUTROS ABS 10.20*   IMMATURE GRANS (ABS) 0.63*   LYMPHS ABS 1.75   MONOS ABS 1.18*   EOS ABS 0.00   MCV 84.2                             Brief Urine Lab Results  (Last result in the past 365 days)      Color   Clarity   Blood   Leuk Est   Nitrite   Protein   CREAT   Urine HCG        02/21/23 0233 Yellow   Clear   Negative   Negative   Negative   Negative               Microbiology Results (last 10 days)     ** No results found for the last 240 hours. **          MRI Brain With & Without Contrast    Result Date: 2/14/2023  PROCEDURE: MRI BRAIN W WO CONTRAST-  HISTORY: glioblastoma; C71.9-Malignant neoplasm of brain, unspecified  COMPARISON: 01/10/2023  TECHNIQUE: Multiplanar imaging was performed of the brain with and without Gadolinium infusion.  FINDINGS: Diffusion sequences show evidence of acute infarct.  Large irregular enhancing lesion is seen centered in the left frontal lobe measuring 5.5 cm width x 6.7 cm AP x 4.2 cm height, previously 5.2 x 6.1 x 4.2 cm. There is irregular ring enhancement noted. There is overlying dural enhancement as well. Dural enhancement may be postoperative. Areas of necrosis and/or cystic degeneration are noted. The lesion does cross the midline through the corpus callosum into the right frontal lobe, similar from prior exam.  Significant edema is noted of the frontal lobes in a distribution similar from prior exam. Edema does extend into the basal ganglia, left much greater than right in a more pronounced fashion from prior exam.  Ventricles are normal. Small extra-axial fluid collection overlying left frontal lobe probably from prior craniotomy has improved. There is no midline shift. No acute hemorrhage is seen.      IMPRESSION : 1. Minimal enlargement of irregular ring-enhancing lesion centered in  the left frontal lobe crossing midline to extend into the right frontal lobe compatible with known GBM. 2. Significant vasogenic edema associated with the lesion shows minimal increased extension into the basal ganglia, left greater than right. 3. Ventricles normal without midline shift. 4. No evidence of acute hemorrhage.  This report was signed and finalized on 2/14/2023 11:24 AM by Kushal Escalante MD.                  Plan for Follow-up of Pending Labs/Results:     Discharge Details        Discharge Medications      New Medications      Instructions Start Date   LORazepam 2 MG/ML concentrated solution  Commonly known as: LORazepam Intensol   2 mg, Oral, Every 2 Hours PRN      Morphine 10 MG/5ML solution   5 mg, Oral, Every 3 Hours PRN         Continue These Medications      Instructions Start Date   butalbital-acetaminophen-caffeine -40 MG per tablet  Commonly known as: FIORICET, ESGIC   1 tablet, Oral, Every 6 Hours PRN      dexamethasone 2 MG tablet  Commonly known as: DECADRON   Starting 2/20/23, Take 4mg 4 x day for 3 days, Take 2mg 4 x day for 3 days, Take 2mg 3 x day for 3 days, Take 2mg 2 x day for 3 days, Take 2mg 1 x day for 3 days, then stop.  Take with food.      docusate sodium 100 MG capsule  Commonly known as: COLACE   100 mg, Oral, Daily, Two capusles      HYDROcodone-acetaminophen 5-325 MG per tablet  Commonly known as: NORCO   1 tablet, Oral, Every 6 Hours PRN      HYDROcodone-acetaminophen 5-325 MG per tablet  Commonly known as: NORCO   1 tablet, Oral, Every 6 Hours PRN      levETIRAcetam 500 MG tablet  Commonly known as: Keppra   500 mg, Oral, 2 Times Daily      niacin 500 MG tablet   500 mg, Oral, Nightly      nystatin 100,000 unit/mL suspension  Commonly known as: MYCOSTATIN   500,000 Units, Swish & Swallow, 4 Times Daily      ondansetron 8 MG tablet  Commonly known as: ZOFRAN   8 mg, Oral, 3 Times Daily PRN      ondansetron 8 MG tablet  Commonly known as: ZOFRAN   8 mg, Oral, 3 Times  Daily PRN      ondansetron 8 MG tablet  Commonly known as: ZOFRAN   8 mg, Oral, 3 Times Daily PRN      Vascepa 1 g capsule capsule  Generic drug: icosapent ethyl   No dose, route, or frequency recorded.         Stop These Medications    amLODIPine 5 MG tablet  Commonly known as: NORVASC     fluconazole 100 MG tablet  Commonly known as: Diflucan     lisinopril 20 MG tablet  Commonly known as: PRINIVIL,ZESTRIL            No Known Allergies      Discharge Disposition:  Hospice/Medical Facility (DC - External)    Diet:  Hospital:  Diet Order   Procedures   • Diet: Regular/House Diet; Texture: Regular Texture (IDDSI 7); Fluid Consistency: Thin (IDDSI 0)       Activity:  - as tolerated    Restrictions or Other Recommendations:  - none       CODE STATUS:    Code Status and Medical Interventions:   Ordered at: 02/21/23 0138     Level Of Support Discussed With:    Health Care Surrogate     Code Status (Patient has no pulse and is not breathing):    No CPR (Do Not Attempt to Resuscitate)     Medical Interventions (Patient has pulse or is breathing):    Comfort Measures       Future Appointments   Date Time Provider Department Center   3/8/2023 11:30 AM Shaan Rader MD [facility]C [facility]   3/13/2023 10:00 AM [facility] MRI 1 [facility]   3/13/2023  2:00 PM Umair Patton MD [facility]   3/14/2023  1:15 PM Faiza Gomez MD [facility]                 Tammi Barajas,   02/21/23      Time Spent on Discharge:  I spent  31 minutes on this discharge activity which included: face-to-face encounter with the patient, reviewing the data in the system, coordination of the care with the nursing staff as well as consultants, documentation, and entering orders.

## 2023-02-21 NOTE — PROGRESS NOTES
Continued Stay Note  [facility]     Patient Name: Nick Hancock  MRN: [id number]  Today's Date: 2/21/2023    Admit Date: 2/20/2023    Plan: Home w/ [facility]   Discharge Plan     Row Name 02/21/23 1604       Plan    Plan Home w/ [facility]    Patient/Family in Agreement with Plan yes    Final Discharge Disposition Code 50 - home with hospice    Final Note Pt has been seen by [facility] & has opted to d/c home w/ hospice care. Pt has the 24 hr number to call to initiate hospice services. Family to transport the pt home.               Discharge Codes    No documentation.               Expected Discharge Date and Time     Expected Discharge Date Expected Discharge Time    Feb 21, 2023             Tessa Aponte

## 2023-02-21 NOTE — DISCHARGE PLACEMENT REQUEST
"Clary Maravilla (52 y.o. Male)     Date of Birth   1970    Social Security Number       Address   [address] [place] [address]    Home Phone   [phone]    MRN   [id number]       Worship   Rastafarian    Marital Status                               Admission Date   2/20/23    Admission Type   Emergency    Admitting Provider   Tammi Barajas DO    Attending Provider   Tammi Barajas DO    Department, Room/Bed   [address], [address]       Discharge Date       Discharge Disposition       Discharge Destination                               Attending Provider: Tammi Barajas DO    Allergies: No Known Allergies    Isolation: None   Infection: None   Code Status: No CPR    Ht: 182.9 cm (72\")   Wt: 93.9 kg (207 lb)    Admission Cmt: None   Principal Problem: Glioblastoma multiforme (HCC) [C71.9]                 Active Insurance as of 2/20/2023     Primary Coverage     Payor Plan Insurance Group Employer/Plan Group    [facility] [id number]     Payor Plan Address Payor Plan Phone Number Payor Plan Fax Number Effective Dates    [address] [phone]  3/16/2020 - None Entered    [facility] [id number]       Subscriber Name Subscriber Birth Date Member ID       CLARY MARAVILLA 1970 [id number]                 Emergency Contacts      (Rel.) Home Phone Work Phone Mobile Phone    malinda candelario (Spouse) [phone] -- [phone]    Manjula Sanford (Sister) -- -- [phone]            Emergency Contact Information     Name Relation Home Work Mobile    candelariomalinda Spouse [phone]    Manjula Sanford Sister   [phone]          Insurance Information                [facility] Phone: [phone]    Subscriber: Clary Maravilla Subscriber#: [id number]    Group#: [id number] Precert#: --             History & Physical      Day, Martine GAMINO MD at 02/21/23 [facility]9              [facility] " "Medicine Services  HISTORY AND PHYSICAL    Patient Name: Nick Hancock  : 1970  MRN: [id number]  Primary Care Physician: Alicia Devlin PA  Date of admission: 2023      Subjective   Subjective     Chief Complaint:   headache, n/v    HPI:  Nick Hancock is a 52 y.o. male with hx of glioblastoma multiforme s/p craniotomy 22 followed by chemotherapy and radiation who presents now w/ intractable headache, n/v.  Hx is currently provided by sister-in-law at bedside.  KEYANA also notes that pt's leg has \"tremor\" that has not been present before.  Dr. Rader has discussed with ER and recommends admission for end of life care.      Review of Systems    unable to obtain     Personal History     Past Medical History:   Diagnosis Date   • Diverticulitis    • High triglycerides    • Hyperlipidemia    • Hypertension    • Intractable headache 2023   • Skin cancer    • Sleep apnea          Oncology Problem List:  Glioblastoma multiforme (HCC) (2023; Status: Active)  Glioblastoma (HCC) (2022; Status: Active)    Oncology/Hematology History   Glioblastoma (HCC)   2022 Initial Diagnosis    Glioblastoma (HCC)     10/18/2022 - 10/18/2022 Chemotherapy    OP CNS Temozolomide + XRT     10/24/2022 - 2022 Radiation    Radiation OncologyTreatment Course:  Nick Hancock received 6000 cGy in 30 fractions to GBM via External Beam Radiation - EBRT.     2023 - 2023 Chemotherapy    OP CNS Temozolomide--Days 1-5     2023 -  Chemotherapy    OP SUPPORTIVE HYDRATION + ANTIEMETICS     2023 -  Chemotherapy    OP CNS Bevacizumab 10mg/kg         Past Surgical History:   Procedure Laterality Date   • COLONOSCOPY     • CRANIOTOMY  2022   • CRANIOTOMY FOR TUMOR N/A 2022    Procedure: CRANIOTOMY FOR TUMOR;  Surgeon: Umair Patton MD;  Location: [facility];  Service: Neurosurgery;  Laterality: N/A;   • EYE SURGERY Bilateral     PRK   • SKIN SURGERY      " removed abnormal cells       Family History:   family history includes Cancer in his mother; Heart attack in his mother; Hyperlipidemia in his father; Hypertension in his brother; Melanoma in his mother; Stroke in his father. Otherwise pertinent FHx was reviewed and unremarkable.     Social History:  reports that he has quit smoking. His smoking use included cigarettes. His smokeless tobacco use includes snuff. He reports that he does not currently use alcohol. He reports that he does not use drugs.  Social History     Social History Narrative   • Not on file       Medications:  Available home medication information reviewed.  HYDROcodone-acetaminophen, amLODIPine, butalbital-acetaminophen-caffeine, dexamethasone, docusate sodium, fluconazole, icosapent ethyl, levETIRAcetam, lisinopril, niacin, nystatin, and ondansetron      No Known Allergies    Objective   Objective     Vital Signs:   Temp:  [97.1 °F (36.2 °C)] 97.1 °F (36.2 °C)  Heart Rate:  [58] 58  Resp:  [13] 13  BP: (140)/(98) 140/98       Physical Exam   Gen; alert, confused  Heent; perrla, eomi, mmm  Cv; rrr, no mrg  L; ctab, no wheeze/crackles  Abd; soft, +bs, ntnd, no r/g  Ext; no cce, palpable pulses  Skin; cdi, warm   Neuro; MAEE; 5/5 , sensation intact; no tremor noted currently  Psych; pleasantly confused      Result Review:  I have personally reviewed the results from the time of this admission to 2/21/2023 01:45 EST and agree with these findings:  [x]  Laboratory list / accordion  []  Microbiology  [x]  Radiology  []  EKG/Telemetry   []  Cardiology/Vascular   []  Pathology  []  Old records  []  Other:  Most notable findings include:          LAB RESULTS:      Lab 02/21/23  0034   WBC 13.83*   HEMOGLOBIN 14.3   HEMATOCRIT 39.5   PLATELETS 219   NEUTROS ABS 10.20*   IMMATURE GRANS (ABS) 0.63*   LYMPHS ABS 1.75   MONOS ABS 1.18*   EOS ABS 0.00   MCV 84.2                                 Microbiology Results (last 10 days)     ** No results found for  the last 240 hours. **          No radiology results from the last 24 hrs        Assessment & Plan   Assessment & Plan     Active Hospital Problems    Diagnosis  POA   • **Glioblastoma multiforme (HCC) [C71.9]  Yes   • Intractable headache [R51.9]  Yes   • Nausea and vomiting [R11.2]  Yes     53 y/o male w/ hx of GBM s/p resection 9/2022, s/p chemo and radiation here now w/  1. GBM w/ intractable headache/n/v  -last MRI 2/14 with vasogenic edema surrounding known left frontal lobe GBM  -will admit with hospice order set and officially consult hospice this a.m. as per 's discussion with Dr. Carlson  -consult to Dr. Rader in a.m.  -will change po Keppra to IV  -continue IV decadron   -have discussed plan in depth with sister-in-law at bedside  - reports that he would like to have pt at home with  Hospice ultimately if this is possible         DVT prophylaxis:         CODE STATUS:     Code Status and Medical Interventions:   Ordered at: 02/21/23 0138     Level Of Support Discussed With:    Health Care Surrogate     Code Status (Patient has no pulse and is not breathing):    No CPR (Do Not Attempt to Resuscitate)     Medical Interventions (Patient has pulse or is breathing):    Comfort Measures       Expected Discharge  TBD      INPATIENT status due to  GBM with vasogenic edema.  I feel patient’s risk for adverse outcomes and need for care warrant INPATIENT evaluation and I predict the patient’s care encounter to likely last beyond 2 midnights.    Martine Lee MD  02/21/23  Electronically signed by Martine Lee MD, 02/21/23, 1:50 AM EST.      Electronically signed by Martine Lee MD at 02/21/23 0150

## 2023-02-21 NOTE — TELEPHONE ENCOUNTER
Meera with hospice left a voicemail she got a hospice referral over on this patient she wants to know if Dr. Rader will follow care, and do pain management? Please call.

## 2023-02-21 NOTE — ED PROVIDER NOTES
EMERGENCY DEPARTMENT ENCOUNTER    Pt Name: Nick Hancock  MRN: [id number]  Pt :   1970  Room Number:    Date of encounter:  2023  PCP: Alicia Devlin PA  ED Provider: Oh Gonzalez MD    Historian: Patient and paramedics      HPI:  Chief Complaint: Headaches, intermittent confusion        Context: Nick Hancock is a 52 y.o. male who presents to the ED c/o symptoms with unclear onset.  The patient has had headaches which have been waxing and waning and he currently rates 8 out of 10 on the pain scale.  The patient was diagnosed with a glioblastoma multiform a in the left frontal region of his brain.  His disease has progressed unfortunately.  He is undergone craniotomy by Dr. Patton on 2022.  He has undergone both chemotherapy and radiation therapy as well.  Paramedics report that the patient was nauseated and had vomiting prior to arrival in the emergency department.  The patient does not recall this.  The patient is a poor historian and at times does not make any sense with his answers.  I am awaiting his  arrival for further confirmation of recent events and current health status.      PAST MEDICAL HISTORY  Past Medical History:   Diagnosis Date   • Diverticulitis    • High triglycerides    • Hyperlipidemia    • Hypertension    • Skin cancer    • Sleep apnea          PAST SURGICAL HISTORY  Past Surgical History:   Procedure Laterality Date   • COLONOSCOPY     • CRANIOTOMY  2022   • CRANIOTOMY FOR TUMOR N/A 2022    Procedure: CRANIOTOMY FOR TUMOR;  Surgeon: Umair Patton MD;  Location: [facility];  Service: Neurosurgery;  Laterality: N/A;   • EYE SURGERY Bilateral     PRK   • SKIN SURGERY      removed abnormal cells         FAMILY HISTORY  Family History   Problem Relation Age of Onset   • Cancer Mother    • Heart attack Mother    • Melanoma Mother    • Hyperlipidemia Father    • Stroke Father    • Hypertension Brother    •  Colon cancer Neg Hx          SOCIAL HISTORY  Social History     Socioeconomic History   • Marital status:    Tobacco Use   • Smoking status: Former     Packs/day: 0.00     Years: 10.00     Pack years: 0.00     Types: Cigarettes   • Smokeless tobacco: Current     Types: Snuff   Vaping Use   • Vaping Use: Never used   Substance and Sexual Activity   • Alcohol use: Not Currently     Comment: Socially   • Drug use: Never   • Sexual activity: Yes     Partners: Male     Birth control/protection: None, Same-sex partner         ALLERGIES  Patient has no known allergies.        REVIEW OF SYSTEMS  Review of Systems       All systems reviewed and negative except for those discussed in HPI.       PHYSICAL EXAM    I have reviewed the triage vital signs and nursing notes.    ED Triage Vitals   Temp Heart Rate Resp BP SpO2   02/20/23 2358 02/20/23 2356 02/20/23 2356 02/20/23 2356 02/20/23 2356   97.1 °F (36.2 °C) 58 13 140/98 96 %      Temp src Heart Rate Source Patient Position BP Location FiO2 (%)   02/20/23 2358 02/20/23 2356 02/20/23 2356 02/20/23 2356 --   Oral Monitor Lying Left arm        Physical Exam  GENERAL:   Appears in no acute distress.  He arrives by paramedics who gave report at the bedside.  The patient appears confused but nontoxic.  HENT: Nares patent.  Very dry mucous membranes  EYES: No scleral icterus.  CV: Regular rhythm, regular rate.  No murmurs gallops rubs.  RESPIRATORY: Normal effort.  No audible wheezes, rales or rhonchi.  Clear to auscultation.  ABDOMEN: Soft, nontender to deep palpation oriented to  MUSCULOSKELETAL: No deformities.   NEURO: Alert, moves all extremities, follows commands.  The patient is oriented to person, place, month and year.  Some of his sentences do not make sense.  Some of his answers are either incoherent or incomplete/wrong.  SKIN: Warm, dry, no rash visualized.      LAB RESULTS  Recent Results (from the past 24 hour(s))   CBC Auto Differential    Collection Time:  02/21/23 12:34 AM    Specimen: Blood   Result Value Ref Range    WBC 13.83 (H) 3.40 - 10.80 10*3/mm3    RBC 4.69 4.14 - 5.80 10*6/mm3    Hemoglobin 14.3 13.0 - 17.7 g/dL    Hematocrit 39.5 37.5 - 51.0 %    MCV 84.2 79.0 - 97.0 fL    MCH 30.5 26.6 - 33.0 pg    MCHC 36.2 (H) 31.5 - 35.7 g/dL    RDW 12.3 12.3 - 15.4 %    RDW-SD 37.1 37.0 - 54.0 fl    MPV 9.7 6.0 - 12.0 fL    Platelets 219 140 - 450 10*3/mm3    Neutrophil % 73.7 42.7 - 76.0 %    Lymphocyte % 12.7 (L) 19.6 - 45.3 %    Monocyte % 8.5 5.0 - 12.0 %    Eosinophil % 0.0 (L) 0.3 - 6.2 %    Basophil % 0.5 0.0 - 1.5 %    Immature Grans % 4.6 (H) 0.0 - 0.5 %    Neutrophils, Absolute 10.20 (H) 1.70 - 7.00 10*3/mm3    Lymphocytes, Absolute 1.75 0.70 - 3.10 10*3/mm3    Monocytes, Absolute 1.18 (H) 0.10 - 0.90 10*3/mm3    Eosinophils, Absolute 0.00 0.00 - 0.40 10*3/mm3    Basophils, Absolute 0.07 0.00 - 0.20 10*3/mm3    Immature Grans, Absolute 0.63 (H) 0.00 - 0.05 10*3/mm3    nRBC 0.0 0.0 - 0.2 /100 WBC       If labs were ordered, I independently reviewed the results and considered them in treating the patient.        RADIOLOGY  No Radiology Exams Resulted Within Past 24 Hours        PROCEDURES    Procedures    No orders to display       MEDICATIONS GIVEN IN ER    Medications   dexamethasone (DECADRON) IVPB 10 mg (10 mg Intravenous New Bag 2/21/23 0041)   ondansetron (ZOFRAN) injection 4 mg (4 mg Intravenous Given 2/21/23 0037)   HYDROmorphone (DILAUDID) injection 0.5 mg (0.5 mg Intravenous Given 2/21/23 0037)         MEDICAL DECISION MAKING, PROGRESS, and CONSULTS    All labs have been independently reviewed by me.  All radiology studies have been reviewed by me and the radiologist dictating the report.  All EKG's have been independently viewed and interpreted by me.      Discussion below represents my analysis of pertinent findings related to patient's condition, differential diagnosis, treatment plan and final disposition.      Differential  diagnosis:    Glioblastoma multiform a with concerning symptoms of worsening/progressive disease.  Other etiologies such as CVA, intracranial hemorrhage, etc. considered as well.      Additional sources:    - Discussed/ obtained information from independent historians: Paramedics.    - External (non-ED) record review: Multiple records from epic were reviewed.    - Chronic or social conditions impacting care: Glioblastoma multiforme    - Shared decision making: Patient is in agreement with current plan for treatment as well as admission to the hospital.  I attempted to speak with him about hospice care but he appears quite confused by this.      Orders placed during this visit:  Orders Placed This Encounter   Procedures   • CBC Auto Differential   • Urinalysis With Microscopic If Indicated (No Culture) - Urine, Clean Catch   • POC Chem 8   • CBC & Differential         Additional orders considered but not ordered:  MRI of brain as it has been done recently.    ED Course:    Consultants:      ED Course as of 02/21/23 0121   Tue Feb 21, 2023   0010 I spoke with the patient's oncologist Dr. Rader.  Case discussed in detail.  Dr. Rader feels the patient is needing admission for symptom control as well as hospice intervention.  He recommends hospice being involved in the morning.  He agrees with IV steroids in addition to IV pain medications.  I have ordered both of these as well as antiemetics here in the emergency department.  I am awaiting the patient's 's arrival to discuss this further. [MS]   0011 Records review    DATE OF VISIT: 2/17/2023     REASON FOR VISIT: Followup for left frontal lobe GBM      PROBLEM LIST:  1.  Left frontal lobe GBM:  A.  Presented with headaches and slurred speech  B.  Diagnosed after craniotomy done by Dr. Beach September 20, 2022  C.  Started definitive concurrent chemotherapy with radiation using Temodar October 2022.  D.  Started maintenance Temodar January 17 , 2022.  Status  post 1 cycle.  E.  Progressive disease document MRI brain done February 2023  F.  Switch treatment to Avastin February 22, 2023.  2.  Hemotherapy induced nausea  3.  Hypertension  4.  Seizure disorder  5. Oral candidiasis [MS]   0013 Records review, emergency department visit dated  2/9/23  History of Present Illness  This is a 52-year-old male with complaint of headache x3 days.  Patient has history of left frontal glioblastoma.  Did have a craniotomy performed on September 20, 2022.  Patient has been treated with chemotherapy and radiation.  Patient has been on Temodar maintenance.patient reports having worse than usual headaches over the past 3 days.  Describes the pain as sharp thunderclap headaches.  Patient reports history of seizure disorder hypertension. [MS]   0014 I have contacted Dr. Lee, hospitalist for admission. [MS]   [phone]  End-of-life care discussion.    I spoke with the patient's  who is also healthcare power of .  Case discussed in detail.  He does want hospice involved.  We specifically discussed his wishes as well as the patient's wishes.  We are all in agreement that the patient should not undergo chest compressions, intubation, mechanical ventilation, deep line placement, ICU placement.  I spent in excess of 20 minutes of time talking about end-of-life decisions for this patient. [MS]   0113 Case discussed in detail with Dr. Lee, hospitalist.  She will admit. [MS]      ED Course User Index  [MS] Oh Gonzalez MD                  AS OF 00:59 EST VITALS:    BP - 140/98  HR - 58  TEMP - 97.1 °F (36.2 °C) (Oral)  O2 SATS - 96%                  DIAGNOSIS  Final diagnoses:   Glioblastoma multiforme (HCC)   Acute intractable headache, unspecified headache type   Admission for end of life care         DISPOSITION  Admission      Please note that portions of this document were completed with voice recognition software.      Oh Gonzalez MD  02/21/23 0143

## 2023-02-21 NOTE — DISCHARGE PLACEMENT REQUEST
"Clary Maravilla (52 y.o. Male)   Referring MD: Dr. Martine Lee  PCP: Alicia Devlin  Hospice Dx: Glioblastoma  Screened Covid negative on 2/20/23  BMI:28.07 kg    Date of Birth   1970    Social Security Number       Address   [address] [place] [address]    Home Phone   [phone]    MRN   [id number]       [employer]    Marital Status                               Admission Date   2/20/23    Admission Type   Emergency    Admitting Provider   Tammi Barajas DO    Attending Provider   Tammi Barajas DO    Department, Room/Bed   [facility] 3H, S382/1       Discharge Date       Discharge Disposition       Discharge Destination                               Attending Provider: Tammi Barajas DO    Allergies: No Known Allergies    Isolation: None   Infection: None   Code Status: No CPR    Ht: 182.9 cm (72\")   Wt: 93.9 kg (207 lb)    Admission Cmt: None   Principal Problem: Glioblastoma multiforme (HCC) [C71.9]                 Active Insurance as of 2/20/2023     Primary Coverage     Payor Plan Insurance Group Employer/Plan Group    [facility]     Payor Plan Address Payor Plan Phone Number Payor Plan Fax Number Effective Dates    [address] [phone]  3/16/2020 - None Entered    [facility] [id number]       Subscriber Name Subscriber Birth Date Member ID       CLARY MARAVILLA 1970 [id number]                 Emergency Contacts      (Rel.) Home Phone Work Phone Mobile Phone    malinda candelario (Spouse) [phone] -- [phone]    Manjula Sanford (Sister) -- -- [phone]            Emergency Contact Information     Name Relation Home Work Mobile    malinda candelario Spouse [phone]    Manjula Sanford Sister   [phone]          Insurance Information                [facility] Phone: [phone]    Subscriber: Clary Maravilla Subscriber#: [id number]    Group#: [id number] Precert#: -- " "         Problem List           Codes Noted - Resolved       Hospital    * (Principal) Glioblastoma multiforme (HCC) ICD-10-CM: C71.9  ICD-9-CM: 191.9 2023 - Present    Intractable headache ICD-10-CM: R51.9  ICD-9-CM: 784.0 2023 - Present    Nausea and vomiting ICD-10-CM: R11.2  ICD-9-CM: 787.01 2023 - Present       Non-Hospital    Hyperglycemia ICD-10-CM: R73.9  ICD-9-CM: 790.29 2022 - Present    Acute respiratory failure with hypoxia (HCC) ICD-10-CM: J96.01  ICD-9-CM: 518.81 2022 - Present    Glioblastoma (HCC) ICD-10-CM: C71.9  ICD-9-CM: 191.9 2022 - Present    Difficulty with speech ICD-10-CM: R47.9  ICD-9-CM: 784.59 2022 - Present    Encounter for colorectal cancer screening ICD-10-CM: Z12.11, Z12.12  ICD-9-CM: V76.51, V76.41 2022 - Present    Essential hypertension ICD-10-CM: I10  ICD-9-CM: 401.9 2020 - Present    Dyslipidemia ICD-10-CM: E78.5  ICD-9-CM: 272.4 2020 - Present    Bradycardia, sinus ICD-10-CM: R00.1  ICD-9-CM: 427.89 2020 - Present        History & Physical      Day, Martine GAMINO MD at [address] [place] [address] Medicine Services  HISTORY AND PHYSICAL    Patient Name: Nick Hancock  : 1970  MRN: [id number]  Primary Care Physician: Alicia Devlin PA  Date of admission: 2023      Subjective   Subjective     Chief Complaint:   headache, n/v    HPI:  Nick Hancock is a 52 y.o. male with hx of glioblastoma multiforme s/p craniotomy 22 followed by chemotherapy and radiation who presents now w/ intractable headache, n/v.  Hx is currently provided by sister-in-law at bedside.  KEYANA also notes that pt's leg has \"tremor\" that has not been present before.  Dr. Rader has discussed with ER and recommends admission for end of life care.      Review of Systems    unable to obtain     Personal History     Past Medical History:   Diagnosis Date   • Diverticulitis    • High triglycerides    • " Hyperlipidemia    • Hypertension    • Intractable headache 2/21/2023   • Skin cancer    • Sleep apnea          Oncology Problem List:  Glioblastoma multiforme (HCC) (02/21/2023; Status: Active)  Glioblastoma (HCC) (09/17/2022; Status: Active)    Oncology/Hematology History   Glioblastoma (HCC)   9/17/2022 Initial Diagnosis    Glioblastoma (HCC)     10/18/2022 - 10/18/2022 Chemotherapy    OP CNS Temozolomide + XRT     10/24/2022 - 12/6/2022 Radiation    Radiation OncologyTreatment Course:  Nick Hancock received 6000 cGy in 30 fractions to GBM via External Beam Radiation - EBRT.     1/17/2023 - 1/17/2023 Chemotherapy    OP CNS Temozolomide--Days 1-5     2/13/2023 -  Chemotherapy    OP SUPPORTIVE HYDRATION + ANTIEMETICS     2/24/2023 -  Chemotherapy    OP CNS Bevacizumab 10mg/kg         Past Surgical History:   Procedure Laterality Date   • COLONOSCOPY     • CRANIOTOMY  08/21/2022   • CRANIOTOMY FOR TUMOR N/A 09/21/2022    Procedure: CRANIOTOMY FOR TUMOR;  Surgeon: Umair Patton MD;  Location: [facility];  Service: Neurosurgery;  Laterality: N/A;   • EYE SURGERY Bilateral 2008    PRK   • SKIN SURGERY  2010    removed abnormal cells       Family History:   family history includes Cancer in his mother; Heart attack in his mother; Hyperlipidemia in his father; Hypertension in his brother; Melanoma in his mother; Stroke in his father. Otherwise pertinent FHx was reviewed and unremarkable.     Social History:  reports that he has quit smoking. His smoking use included cigarettes. His smokeless tobacco use includes snuff. He reports that he does not currently use alcohol. He reports that he does not use drugs.  Social History     Social History Narrative   • Not on file       Medications:  Available home medication information reviewed.  HYDROcodone-acetaminophen, amLODIPine, butalbital-acetaminophen-caffeine, dexamethasone, docusate sodium, fluconazole, icosapent ethyl, levETIRAcetam, lisinopril, niacin, nystatin, and  ondansetron      No Known Allergies    Objective   Objective     Vital Signs:   Temp:  [97.1 °F (36.2 °C)] 97.1 °F (36.2 °C)  Heart Rate:  [58] 58  Resp:  [13] 13  BP: (140)/(98) 140/98       Physical Exam   Gen; alert, confused  Heent; perrla, eomi, mmm  Cv; rrr, no mrg  L; ctab, no wheeze/crackles  Abd; soft, +bs, ntnd, no r/g  Ext; no cce, palpable pulses  Skin; cdi, warm   Neuro; MAEE; 5/5 , sensation intact; no tremor noted currently  Psych; pleasantly confused      Result Review:  I have personally reviewed the results from the time of this admission to 2/21/2023 01:45 EST and agree with these findings:  [x]  Laboratory list / accordion  []  Microbiology  [x]  Radiology  []  EKG/Telemetry   []  Cardiology/Vascular   []  Pathology  []  Old records  []  Other:  Most notable findings include:          LAB RESULTS:      Lab 02/21/23  0034   WBC 13.83*   HEMOGLOBIN 14.3   HEMATOCRIT 39.5   PLATELETS 219   NEUTROS ABS 10.20*   IMMATURE GRANS (ABS) 0.63*   LYMPHS ABS 1.75   MONOS ABS 1.18*   EOS ABS 0.00   MCV 84.2                                 Microbiology Results (last 10 days)     ** No results found for the last 240 hours. **          No radiology results from the last 24 hrs        Assessment & Plan   Assessment & Plan     Active Hospital Problems    Diagnosis  POA   • **Glioblastoma multiforme (HCC) [C71.9]  Yes   • Intractable headache [R51.9]  Yes   • Nausea and vomiting [R11.2]  Yes     53 y/o male w/ hx of GBM s/p resection 9/2022, s/p chemo and radiation here now w/  1. GBM w/ intractable headache/n/v  -last MRI 2/14 with vasogenic edema surrounding known left frontal lobe GBM  -will admit with hospice order set and officially consult hospice this a.m. as per 's discussion with Dr. Carlson  -consult to Dr. Rader in a.m.  -will change po Keppra to IV  -continue IV decadron   -have discussed plan in depth with sister-in-law at bedside  - reports that he would like to have pt at home with   Hospice ultimately if this is possible         DVT prophylaxis:         CODE STATUS:     Code Status and Medical Interventions:   Ordered at: 02/21/23 0138     Level Of Support Discussed With:    Health Care Surrogate     Code Status (Patient has no pulse and is not breathing):    No CPR (Do Not Attempt to Resuscitate)     Medical Interventions (Patient has pulse or is breathing):    Comfort Measures       Expected Discharge  TBD      INPATIENT status due to  GBM with vasogenic edema.  I feel patient’s risk for adverse outcomes and need for care warrant INPATIENT evaluation and I predict the patient’s care encounter to likely last beyond 2 midnights.    Martine Lee MD  02/21/23  Electronically signed by Martine Lee MD, 02/21/23, 1:50 AM EST.      Electronically signed by Martine Lee MD at 02/21/23 0150       Blood Administration Record (From admission, onward)    None          Imaging Results (Last 72 Hours)     ** No results found for the last 72 hours. **        Consult Notes (last 72 hours)  Notes from 02/18/23 0955 through 02/21/23 0955   No notes of this type exist for this encounter.

## 2023-02-21 NOTE — PAYOR COMM NOTE
Patient referral authorization form    Patient name:  Nick Pacheconathalia ____________________________________________________________  [facility] (-dd-yyyy):  1970 ___________________________________________________   ID:  [id number]________________________    Sponsor address: [address]   [place] [address] ____________________________________________________________ Other Health Insurance: ? Yes ?XXX No Carrier:  ____________________________________________________________  Policy #  [id number] _________________________________________________   Phone:  [phone]_____________________________  Provider or setting:  ?XXX Inpatient facility  Date of service (if known; -dd-yyyy):  2023 ____________________________  ? Evaluate only  ? Evaluate and treat  Point of contact:  Emergency Room to Inpatient Admission ____________________________________________________________  Ordering provider:  Martine Lee MD   NPI: [id number]    Phone:  _____________________________  Type of service: ? Office visit List specialty:  Hospitalist  Specialist Tax ID/NPI: [id number]    ? Surgical/Diagnostic procedure  ? Speech therapy  ? Hospice  ? Home health  ? DME  ? Observation  ? PT/OT  ? OP behavioral health  ? Other  ?XXX Inpatient admission: ? Acute care  ? Rehab  ? SNF   If inpatient, please provide a diagnosis code:  ____________________________________________________________  [facility]  [facility]9 Glioblastoma multiforme   Facility:  [facility]  Tax ID/NPI:  [id number]   Address:  [address] [place] [address] __________________________________________  Rendering provider:   Martine Lee MD ___________________________________________________  Tax ID/NPI: [id number]   Address:  [address] [place] [address] _________________________________________  Presenting symptoms or reason for referral:  headaches which have been waxing and waning and he currently  "rates 8 out of 10 on the pain scale. ____________________________________________________________  Pertinent history, findings and specials situations include known discharge needs if inpatient admission:  will admit with hospice order set and officially consult hospice this nuzhat _______________________________________________________   referrals should be submitted through [employer]/ [employer]. If you do not have internet connection in your office, you may complete and submit this form by fax to [phone]. The  hospital or clinic in your area may have Right of First Refusal for this service.    Utilization Review  Phone [phone]  Fax [phone]    [place] [address]    Nick Hancock (52 y.o. Male)     Date of Birth   1970    Social Security Number       Address   [address] [place] [address]    Home Phone   [phone]    MRN   [id number]       [place]   Rastafarian    Marital Status                               Admission Date   2/20/23    Admission Type   Emergency    Admitting Provider   Tammi Barajas DO    Attending Provider   Tammi Barajas DO    Department, Room/Bed   [facility] 3H, S382/1       Discharge Date       Discharge Disposition       Discharge Destination                               Attending Provider: Tammi Barajas DO    Allergies: No Known Allergies    Isolation: None   Infection: None   Code Status: No CPR    Ht: 182.9 cm (72\")   Wt: 93.9 kg (207 lb)    Admission Cmt: None   Principal Problem: Glioblastoma multiforme (HCC) [C71.9]                 Active Insurance as of 2/20/2023     Primary Coverage     Payor Plan Insurance Group Employer/Plan Group    [facility]     Payor Plan Address Payor Plan Phone Number Payor Plan Fax Number Effective Dates    [address] [phone]  3/16/2020 - None Entered    [facility] " "[id number]       Subscriber Name Subscriber Birth Date Member ID       CLARY MARAVILLA 1970 [id number]                 Emergency Contacts      (Rel.) Home Phone Work Phone Mobile Phone    malinda candelario (Spouse) [phone] -- [phone]    Manjula Sanford (Sister) -- -- [phone]            [place]: [facility] [id number] Tax ID [id number]  Insurance Information                [facility] Phone: [phone]    Subscriber: Clary Maravilla Subscriber#: [id number]    Group#: [id number] Precert#: --             History & Physical      Day, Martine GAMINO MD at 23 0139              [facility] Medicine Services  HISTORY AND PHYSICAL    Patient Name: Clary Maravilla  : 1970  MRN: [id number]  Primary Care Physician: Alicia Devlin PA  Date of admission: 2023      Subjective   Subjective     Chief Complaint:   headache, n/v    HPI:  Clary Maravilla is a 52 y.o. male with hx of glioblastoma multiforme s/p craniotomy 22 followed by chemotherapy and radiation who presents now w/ intractable headache, n/v.  Hx is currently provided by sister-in-law at bedside.  KEYANA also notes that pt's leg has \"tremor\" that has not been present before.  Dr. Rader has discussed with ER and recommends admission for end of life care.      Review of Systems    unable to obtain     Personal History     Past Medical History:   Diagnosis Date   • Diverticulitis    • High triglycerides    • Hyperlipidemia    • Hypertension    • Intractable headache 2023   • Skin cancer    • Sleep apnea          Oncology Problem List:  Glioblastoma multiforme (HCC) (2023; Status: Active)  Glioblastoma (HCC) (2022; Status: Active)    Oncology/Hematology History   Glioblastoma (HCC)   2022 Initial Diagnosis    Glioblastoma (HCC)     10/18/2022 - 10/18/2022 Chemotherapy    OP CNS Temozolomide + XRT     10/24/2022 - 2022 Radiation    Radiation " OncologyTreatment Course:  Nick Hancock received 6000 cGy in 30 fractions to GBM via External Beam Radiation - EBRT.     1/17/2023 - 1/17/2023 Chemotherapy    OP CNS Temozolomide--Days 1-5     2/13/2023 -  Chemotherapy    OP SUPPORTIVE HYDRATION + ANTIEMETICS     2/24/2023 -  Chemotherapy    OP CNS Bevacizumab 10mg/kg         Past Surgical History:   Procedure Laterality Date   • COLONOSCOPY     • CRANIOTOMY  08/21/2022   • CRANIOTOMY FOR TUMOR N/A 09/21/2022    Procedure: CRANIOTOMY FOR TUMOR;  Surgeon: Umair Patton MD;  [facility];  Service: Neurosurgery;  Laterality: N/A;   • EYE SURGERY Bilateral 2008    PRK   • SKIN SURGERY  2010    removed abnormal cells       Family History:   family history includes Cancer in his mother; Heart attack in his mother; Hyperlipidemia in his father; Hypertension in his brother; Melanoma in his mother; Stroke in his father. Otherwise pertinent FHx was reviewed and unremarkable.     Social History:  reports that he has quit smoking. His smoking use included cigarettes. His smokeless tobacco use includes snuff. He reports that he does not currently use alcohol. He reports that he does not use drugs.  Social History     Social History Narrative   • Not on file       Medications:  Available home medication information reviewed.  HYDROcodone-acetaminophen, amLODIPine, butalbital-acetaminophen-caffeine, dexamethasone, docusate sodium, fluconazole, icosapent ethyl, levETIRAcetam, lisinopril, niacin, nystatin, and ondansetron      No Known Allergies    Objective   Objective     Vital Signs:   Temp:  [97.1 °F (36.2 °C)] 97.1 °F (36.2 °C)  Heart Rate:  [58] 58  Resp:  [13] 13  BP: (140)/(98) 140/98       Physical Exam   Gen; alert, confused  Heent; perrla, eomi, mmm  Cv; rrr, no mrg  L; ctab, no wheeze/crackles  Abd; soft, +bs, ntnd, no r/g  Ext; no cce, palpable pulses  Skin; cdi, warm   Neuro; MAEE; 5/5 , sensation intact; no tremor noted currently  Psych; pleasantly  confused      Result Review:  I have personally reviewed the results from the time of this admission to 2/21/2023 01:45 EST and agree with these findings:  [x]  Laboratory list / accordion  []  Microbiology  [x]  Radiology  []  EKG/Telemetry   []  Cardiology/Vascular   []  Pathology  []  Old records  []  Other:  Most notable findings include:          LAB RESULTS:      Lab 02/21/23  0034   WBC 13.83*   HEMOGLOBIN 14.3   HEMATOCRIT 39.5   PLATELETS 219   NEUTROS ABS 10.20*   IMMATURE GRANS (ABS) 0.63*   LYMPHS ABS 1.75   MONOS ABS 1.18*   EOS ABS 0.00   MCV 84.2                                 Microbiology Results (last 10 days)     ** No results found for the last 240 hours. **          No radiology results from the last 24 hrs        Assessment & Plan   Assessment & Plan     Active Hospital Problems    Diagnosis  POA   • **Glioblastoma multiforme (HCC) [C71.9]  Yes   • Intractable headache [R51.9]  Yes   • Nausea and vomiting [R11.2]  Yes     51 y/o male w/ hx of GBM s/p resection 9/2022, s/p chemo and radiation here now w/  1. GBM w/ intractable headache/n/v  -last MRI 2/14 with vasogenic edema surrounding known left frontal lobe GBM  -will admit with hospice order set and officially consult hospice this a.m. as per 's discussion with Dr. Carlson  -consult to Dr. Rader in a.m.  -will change po Keppra to IV  -continue IV decadron   -have discussed plan in depth with sister-in-law at bedside  - reports that he would like to have pt at home with  Hospice ultimately if this is possible         DVT prophylaxis:         CODE STATUS:     Code Status and Medical Interventions:   Ordered at: 02/21/23 0138     Level Of Support Discussed With:    Health Care Surrogate     Code Status (Patient has no pulse and is not breathing):    No CPR (Do Not Attempt to Resuscitate)     Medical Interventions (Patient has pulse or is breathing):    Comfort Measures       Expected Discharge  TBD      INPATIENT status due to   GBM with vasogenic edema.  I feel patient’s risk for adverse outcomes and need for care warrant INPATIENT evaluation and I predict the patient’s care encounter to likely last beyond 2 midnights.    Martine Lee MD  23  Electronically signed by Martine Lee MD, 23, 1:50 AM EST.      Electronically signed by Martine Lee MD at 23 0150          Emergency Department Notes      Oh Gonzalez MD at 23 0008           EMERGENCY DEPARTMENT ENCOUNTER    Pt Name: Nick Hancock  MRN: [id number]  Pt :   1970  Room Number:    Date of encounter:  2023  PCP: Alicia Devlin PA  ED Provider: Oh Gonzalez MD    Historian: Patient and paramedics      HPI:  Chief Complaint: Headaches, intermittent confusion        Context: Nick Hancock is a 52 y.o. male who presents to the ED c/o symptoms with unclear onset.  The patient has had headaches which have been waxing and waning and he currently rates 8 out of 10 on the pain scale.  The patient was diagnosed with a glioblastoma multiform a in the left frontal region of his brain.  His disease has progressed unfortunately.  He is undergone craniotomy by Dr. Patton on 2022.  He has undergone both chemotherapy and radiation therapy as well.  Paramedics report that the patient was nauseated and had vomiting prior to arrival in the emergency department.  The patient does not recall this.  The patient is a poor historian and at times does not make any sense with his answers.  I am awaiting his  arrival for further confirmation of recent events and current health status.      PAST MEDICAL HISTORY  Past Medical History:   Diagnosis Date   • Diverticulitis    • High triglycerides    • Hyperlipidemia    • Hypertension    • Skin cancer    • Sleep apnea          PAST SURGICAL HISTORY  Past Surgical History:   Procedure Laterality Date   • COLONOSCOPY     • CRANIOTOMY  2022   • CRANIOTOMY FOR TUMOR  N/A 09/21/2022    Procedure: CRANIOTOMY FOR TUMOR;  Surgeon: Umair Patton MD;  [facility];  Service: Neurosurgery;  Laterality: N/A;   • EYE SURGERY Bilateral 2008    PRK   • SKIN SURGERY  2010    removed abnormal cells         FAMILY HISTORY  Family History   Problem Relation Age of Onset   • Cancer Mother    • Heart attack Mother    • Melanoma Mother    • Hyperlipidemia Father    • Stroke Father    • Hypertension Brother    • Colon cancer Neg Hx          SOCIAL HISTORY  Social History     Socioeconomic History   • Marital status:    Tobacco Use   • Smoking status: Former     Packs/day: 0.00     Years: 10.00     Pack years: 0.00     Types: Cigarettes   • Smokeless tobacco: Current     Types: Snuff   Vaping Use   • Vaping Use: Never used   Substance and Sexual Activity   • Alcohol use: Not Currently     Comment: Socially   • Drug use: Never   • Sexual activity: Yes     Partners: Male     Birth control/protection: None, Same-sex partner         ALLERGIES  Patient has no known allergies.        REVIEW OF SYSTEMS  Review of Systems       All systems reviewed and negative except for those discussed in HPI.       PHYSICAL EXAM    I have reviewed the triage vital signs and nursing notes.    ED Triage Vitals   Temp Heart Rate Resp BP SpO2   02/20/23 2358 02/20/23 2356 02/20/23 2356 02/20/23 2356 02/20/23 2356   97.1 °F (36.2 °C) 58 13 140/98 96 %      Temp src Heart Rate Source Patient Position BP Location FiO2 (%)   02/20/23 2358 02/20/23 2356 02/20/23 2356 02/20/23 2356 --   Oral Monitor Lying Left arm        Physical Exam  GENERAL:   Appears in no acute distress.  He arrives by paramedics who gave report at the bedside.  The patient appears confused but nontoxic.  HENT: Nares patent.  Very dry mucous membranes  EYES: No scleral icterus.  CV: Regular rhythm, regular rate.  No murmurs gallops rubs.  RESPIRATORY: Normal effort.  No audible wheezes, rales or rhonchi.  Clear to auscultation.  ABDOMEN:  Soft, nontender to deep palpation oriented to  MUSCULOSKELETAL: No deformities.   NEURO: Alert, moves all extremities, follows commands.  The patient is oriented to person, place, month and year.  Some of his sentences do not make sense.  Some of his answers are either incoherent or incomplete/wrong.  SKIN: Warm, dry, no rash visualized.      LAB RESULTS  Recent Results (from the past 24 hour(s))   CBC Auto Differential    Collection Time: 02/21/23 12:34 AM    Specimen: Blood   Result Value Ref Range    WBC 13.83 (H) 3.40 - 10.80 10*3/mm3    RBC 4.69 4.14 - 5.80 10*6/mm3    Hemoglobin 14.3 13.0 - 17.7 g/dL    Hematocrit 39.5 37.5 - 51.0 %    MCV 84.2 79.0 - 97.0 fL    MCH 30.5 26.6 - 33.0 pg    MCHC 36.2 (H) 31.5 - 35.7 g/dL    RDW 12.3 12.3 - 15.4 %    RDW-SD 37.1 37.0 - 54.0 fl    MPV 9.7 6.0 - 12.0 fL    Platelets 219 140 - 450 10*3/mm3    Neutrophil % 73.7 42.7 - 76.0 %    Lymphocyte % 12.7 (L) 19.6 - 45.3 %    Monocyte % 8.5 5.0 - 12.0 %    Eosinophil % 0.0 (L) 0.3 - 6.2 %    Basophil % 0.5 0.0 - 1.5 %    Immature Grans % 4.6 (H) 0.0 - 0.5 %    Neutrophils, Absolute 10.20 (H) 1.70 - 7.00 10*3/mm3    Lymphocytes, Absolute 1.75 0.70 - 3.10 10*3/mm3    Monocytes, Absolute 1.18 (H) 0.10 - 0.90 10*3/mm3    Eosinophils, Absolute 0.00 0.00 - 0.40 10*3/mm3    Basophils, Absolute 0.07 0.00 - 0.20 10*3/mm3    Immature Grans, Absolute 0.63 (H) 0.00 - 0.05 10*3/mm3    nRBC 0.0 0.0 - 0.2 /100 WBC       If labs were ordered, I independently reviewed the results and considered them in treating the patient.        RADIOLOGY  No Radiology Exams Resulted Within Past 24 Hours        PROCEDURES    Procedures    No orders to display       MEDICATIONS GIVEN IN ER    Medications   dexamethasone (DECADRON) IVPB 10 mg (10 mg Intravenous New Bag 2/21/23 0041)   ondansetron (ZOFRAN) injection 4 mg (4 mg Intravenous Given 2/21/23 0037)   HYDROmorphone (DILAUDID) injection 0.5 mg (0.5 mg Intravenous Given 2/21/23 0037)         MEDICAL  DECISION MAKING, PROGRESS, and CONSULTS    All labs have been independently reviewed by me.  All radiology studies have been reviewed by me and the radiologist dictating the report.  All EKG's have been independently viewed and interpreted by me.      Discussion below represents my analysis of pertinent findings related to patient's condition, differential diagnosis, treatment plan and final disposition.      Differential diagnosis:    Glioblastoma multiform a with concerning symptoms of worsening/progressive disease.  Other etiologies such as CVA, intracranial hemorrhage, etc. considered as well.      Additional sources:    - Discussed/ obtained information from independent historians: Paramedics.    - External (non-ED) record review: Multiple records from epic were reviewed.    - Chronic or social conditions impacting care: Glioblastoma multiforme    - Shared decision making: Patient is in agreement with current plan for treatment as well as admission to the hospital.  I attempted to speak with him about hospice care but he appears quite confused by this.      Orders placed during this visit:  Orders Placed This Encounter   Procedures   • CBC Auto Differential   • Urinalysis With Microscopic If Indicated (No Culture) - Urine, Clean Catch   • POC Chem 8   • CBC & Differential         Additional orders considered but not ordered:  MRI of brain as it has been done recently.    ED Course:    Consultants:      ED Course as of 02/21/23 0121   Tue Feb 21, 2023   0010 I spoke with the patient's oncologist Dr. Rader.  Case discussed in detail.  Dr. Rader feels the patient is needing admission for symptom control as well as hospice intervention.  He recommends hospice being involved in the morning.  He agrees with IV steroids in addition to IV pain medications.  I have ordered both of these as well as antiemetics here in the emergency department.  I am awaiting the patient's 's arrival to discuss this further. [MS]    0011 Records review    DATE OF VISIT: 2/17/2023     REASON FOR VISIT: Followup for left frontal lobe GBM      PROBLEM LIST:  1.  Left frontal lobe GBM:  A.  Presented with headaches and slurred speech  B.  Diagnosed after craniotomy done by Dr. Beach September 20, 2022  C.  Started definitive concurrent chemotherapy with radiation using Temodar October 2022.  D.  Started maintenance Temodar January 17 , 2022.  Status post 1 cycle.  E.  Progressive disease document MRI brain done February 2023  F.  Switch treatment to Avastin February 22, 2023.  2.  Hemotherapy induced nausea  3.  Hypertension  4.  Seizure disorder  5. Oral candidiasis [MS]   0013 Records review, emergency department visit dated  2/9/23  History of Present Illness  This is a 52-year-old male with complaint of headache x3 days.  Patient has history of left frontal glioblastoma.  Did have a craniotomy performed on September 20, 2022.  Patient has been treated with chemotherapy and radiation.  Patient has been on Temodar maintenance.patient reports having worse than usual headaches over the past 3 days.  Describes the pain as sharp thunderclap headaches.  Patient reports history of seizure disorder hypertension. [MS]   0014 I have contacted Dr. Lee, hospitalist for admission. [MS]   [id number]  End-of-life care discussion.    I spoke with the patient's  who is also healthcare power of .  Case discussed in detail.  He does want hospice involved.  We specifically discussed his wishes as well as the patient's wishes.  We are all in agreement that the patient should not undergo chest compressions, intubation, mechanical ventilation, deep line placement, ICU placement.  I spent in excess of 20 minutes of time talking about end-of-life decisions for this patient. [MS]   0113 Case discussed in detail with Dr. Lee, hospitalist.  She will admit. [MS]      ED Course User Index  [MS] Oh Gonzalez MD                  AS OF 00:59 EST  VITALS:    BP - 140/98  HR - 58  TEMP - 97.1 °F (36.2 °C) (Oral)  O2 SATS - 96%                  DIAGNOSIS  Final diagnoses:   Glioblastoma multiforme (HCC)   Acute intractable headache, unspecified headache type   Admission for end of life care         DISPOSITION  Admission      Please note that portions of this document were completed with voice recognition software.      Oh Gonzalez MD  02/21/23 0143      Electronically signed by Oh Gonzalez MD at 02/21/23 0143         Imaging Results (Last 24 Hours)     ** No results found for the last 24 hours. **        Physician Progress Notes (last 24 hours)  Notes from 02/20/23 1056 through 02/21/23 1056   No notes of this type exist for this encounter.         Consult Notes (last 24 hours)  Notes from 02/20/23 1056 through 02/21/23 1056   No notes of this type exist for this encounter.

## 2023-02-21 NOTE — H&P
"    [facility] Medicine Services  HISTORY AND PHYSICAL    Patient Name: Nick Hancock  : 1970  MRN: [id number]  Primary Care Physician: Alicia Devlin PA  Date of admission: 2023      Subjective   Subjective     Chief Complaint:   headache, n/v    HPI:  Nick Hancock is a 52 y.o. male with hx of glioblastoma multiforme s/p craniotomy 22 followed by chemotherapy and radiation who presents now w/ intractable headache, n/v.  Hx is currently provided by sister-in-law at bedside.  KEYANA also notes that pt's leg has \"tremor\" that has not been present before.  Dr. Rader has discussed with ER and recommends admission for end of life care.      Review of Systems    unable to obtain     Personal History     Past Medical History:   Diagnosis Date   • Diverticulitis    • High triglycerides    • Hyperlipidemia    • Hypertension    • Intractable headache 2023   • Skin cancer    • Sleep apnea          Oncology Problem List:  Glioblastoma multiforme (HCC) (2023; Status: Active)  Glioblastoma (HCC) (2022; Status: Active)    Oncology/Hematology History   Glioblastoma (HCC)   2022 Initial Diagnosis    Glioblastoma (HCC)     10/18/2022 - 10/18/2022 Chemotherapy    OP CNS Temozolomide + XRT     10/24/2022 - 2022 Radiation    Radiation OncologyTreatment Course:  Nick Hancock received 6000 cGy in 30 fractions to GBM via External Beam Radiation - EBRT.     2023 - 2023 Chemotherapy    OP CNS Temozolomide--Days 1-5     2023 -  Chemotherapy    OP SUPPORTIVE HYDRATION + ANTIEMETICS     2023 -  Chemotherapy    OP CNS Bevacizumab 10mg/kg         Past Surgical History:   Procedure Laterality Date   • COLONOSCOPY     • CRANIOTOMY  2022   • CRANIOTOMY FOR TUMOR N/A 2022    Procedure: CRANIOTOMY FOR TUMOR;  Surgeon: Umair Patton MD;  Location: [facility];  Service: Neurosurgery;  Laterality: N/A;   • EYE SURGERY Bilateral " 2008    PRK   • SKIN SURGERY  2010    removed abnormal cells       Family History:   family history includes Cancer in his mother; Heart attack in his mother; Hyperlipidemia in his father; Hypertension in his brother; Melanoma in his mother; Stroke in his father. Otherwise pertinent FHx was reviewed and unremarkable.     Social History:  reports that he has quit smoking. His smoking use included cigarettes. His smokeless tobacco use includes snuff. He reports that he does not currently use alcohol. He reports that he does not use drugs.  Social History     Social History Narrative   • Not on file       Medications:  Available home medication information reviewed.  HYDROcodone-acetaminophen, amLODIPine, butalbital-acetaminophen-caffeine, dexamethasone, docusate sodium, fluconazole, icosapent ethyl, levETIRAcetam, lisinopril, niacin, nystatin, and ondansetron      No Known Allergies    Objective   Objective     Vital Signs:   Temp:  [97.1 °F (36.2 °C)] 97.1 °F (36.2 °C)  Heart Rate:  [58] 58  Resp:  [13] 13  BP: (140)/(98) 140/98       Physical Exam   Gen; alert, confused  Heent; perrla, eomi, mmm  Cv; rrr, no mrg  L; ctab, no wheeze/crackles  Abd; soft, +bs, ntnd, no r/g  Ext; no cce, palpable pulses  Skin; cdi, warm   Neuro; MAEE; 5/5 , sensation intact; no tremor noted currently  Psych; pleasantly confused      Result Review:  I have personally reviewed the results from the time of this admission to 2/21/2023 01:45 EST and agree with these findings:  [x]  Laboratory list / accordion  []  Microbiology  [x]  Radiology  []  EKG/Telemetry   []  Cardiology/Vascular   []  Pathology  []  Old records  []  Other:  Most notable findings include:          LAB RESULTS:      Lab 02/21/23  0034   WBC 13.83*   HEMOGLOBIN 14.3   HEMATOCRIT 39.5   PLATELETS 219   NEUTROS ABS 10.20*   IMMATURE GRANS (ABS) 0.63*   LYMPHS ABS 1.75   MONOS ABS 1.18*   EOS ABS 0.00   MCV 84.2                                 Microbiology Results  (last 10 days)     ** No results found for the last 240 hours. **          No radiology results from the last 24 hrs        Assessment & Plan   Assessment & Plan     Active Hospital Problems    Diagnosis  POA   • **Glioblastoma multiforme (HCC) [C71.9]  Yes   • Intractable headache [R51.9]  Yes   • Nausea and vomiting [R11.2]  Yes     53 y/o male w/ hx of GBM s/p resection 9/2022, s/p chemo and radiation here now w/  1. GBM w/ intractable headache/n/v  -last MRI 2/14 with vasogenic edema surrounding known left frontal lobe GBM  -will admit with hospice order set and officially consult hospice this a.m. as per 's discussion with Dr. Carlson  -consult to Dr. Rader in a.m.  -will change po Keppra to IV  -continue IV decadron   -have discussed plan in depth with sister-in-law at bedside  - reports that he would like to have pt at home with  Hospice ultimately if this is possible         DVT prophylaxis:         CODE STATUS:     Code Status and Medical Interventions:   Ordered at: 02/21/23 0138     Level Of Support Discussed With:    Health Care Surrogate     Code Status (Patient has no pulse and is not breathing):    No CPR (Do Not Attempt to Resuscitate)     Medical Interventions (Patient has pulse or is breathing):    Comfort Measures       Expected Discharge  TBD      INPATIENT status due to  GBM with vasogenic edema.  I feel patient’s risk for adverse outcomes and need for care warrant INPATIENT evaluation and I predict the patient’s care encounter to likely last beyond 2 midnights.    Martine Lee MD  02/21/23  Electronically signed by Martine Lee MD, 02/21/23, 1:50 AM EST.

## 2023-02-21 NOTE — PROGRESS NOTES
[facility] Medicine Services  ADMISSION FOLLOW-UP NOTE          Patient admitted after midnight, H&P by my partner performed earlier on today's date reviewed.  Interim findings, labs, and charting also reviewed.        The [facility] Problem List has been managed and updated to include any new diagnoses:  Active Hospital Problems    Diagnosis  POA   • **Glioblastoma multiforme (HCC) [C71.9]  Yes   • Intractable headache [R51.9]  Yes   • Nausea and vomiting [R11.2]  Yes      Resolved Hospital Problems   No resolved problems to display.         ADDITIONAL PLAN:  - detailed assessment and plan from admission reviewed  - patient comfortable, spouse at bedside. Allow comfort diet. Home with hospice when all arranged.    Expected Discharge   Expected Discharge Date and Time     Expected Discharge Date Expected Discharge Time    Feb 22, 2023            Tammi Barajas DO  02/21/23

## 2023-02-22 ENCOUNTER — TELEPHONE (OUTPATIENT)
Dept: ONCOLOGY | Facility: CLINIC | Age: 53
End: 2023-02-22
Payer: OTHER GOVERNMENT

## 2023-02-22 NOTE — TELEPHONE ENCOUNTER
Returned Michelle's phone call.  I said this is fine but asked if our NP can sign the order and they have to have it from Dr. Rader.  I advised he will be out of town for 2 weeks so they are going to have their director manage care until Dr. Rader is back in country.

## 2023-03-13 ENCOUNTER — HOSPITAL ENCOUNTER (OUTPATIENT)
Dept: MRI IMAGING | Facility: HOSPITAL | Age: 53
Discharge: HOME | End: 2023-03-13
Payer: OTHER GOVERNMENT

## 2023-03-22 ENCOUNTER — OFFICE VISIT (OUTPATIENT)
Dept: ONCOLOGY | Facility: CLINIC | Age: 53
End: 2023-03-22
Payer: OTHER GOVERNMENT

## 2023-03-22 DIAGNOSIS — C71.9 GLIOBLASTOMA MULTIFORME: Primary | ICD-10-CM

## 2023-03-22 PROCEDURE — 99422 OL DIG E/M SVC 11-20 MIN: CPT | Performed by: INTERNAL MEDICINE

## 2023-03-22 NOTE — PROGRESS NOTES
This visit has been rescheduled as a phone visit to comply with patient safety concerns in accordance with [facility] recommendations. Total time of discussion was 16 minutes.    You have chosen to receive care through a telephone visit. Do you consent to use a telephone visit for your medical care today? Yes    16DATE OF VISIT: 3/22/2023    REASON FOR VISIT: Followup for left frontal lobe GBM     PROBLEM LIST:  1.  Left frontal lobe GBM:  A.  Presented with headaches and slurred speech  B.  Diagnosed after craniotomy done by Dr. Beach September 20, 2022  C.  Started definitive concurrent chemotherapy with radiation using Temodar October 2022.  D.  Started maintenance Temodar January 17 , 2022.  Status post 1 cycle.  E.  Progressive disease document MRI brain done February 2023  F.  Switch treatment to Avastin February 22, 2023.  2.  Hemotherapy induced nausea  3.  Hypertension  4.  Seizure disorder  5. Oral candidiasis    HISTORY OF PRESENT ILLNESS: The patient is a very pleasant 52 y.o. male  with past medical history significant for GBM diagnosed September 20, 2022. The  patient is here today for scheduled follow-up visit.    SUBJECTIVE: Reinaldo was interviewed laith using telemedicine. His  Aron joined us. He is doing well. His appetite did improve. Energy is better.     Past History:  Medical History: has a past medical history of Diverticulitis, High triglycerides, Hyperlipidemia, Hypertension, Intractable headache (2/21/2023), Skin cancer, and Sleep apnea.   Surgical History: has a past surgical history that includes Eye surgery (Bilateral, 2008); Skin surgery (2010); Colonoscopy; Craniotomy for Tumor (N/A, 09/21/2022); and Craniotomy (08/21/2022).   Family History: family history includes Cancer in his mother; Heart attack in his mother; Hyperlipidemia in his father; Hypertension in his brother; Melanoma in his mother; Stroke in his father.   Social History: reports that he has quit smoking. His smoking use  included cigarettes. His smokeless tobacco use includes snuff. He reports that he does not currently use alcohol. He reports that he does not use drugs.    (Not in a hospital admission)     Allergies: Patient has no known allergies.     Review of Systems   Constitutional: Positive for fatigue.   Neurological: Positive for weakness and headaches.   Psychiatric/Behavioral: The patient is nervous/anxious.        PHYSICAL EXAMINATION:   There were no vitals taken for this visit.   There were no vitals filed for this visit.              ECOG Performance Status: 2 - Symptomatic, <50% confined to bed      General Appearance:      alert, cooperative, no apparent distress, appears stated age and frail appearing   Lungs:      Heart:     Abdomen:  Mucous membranes:                    No visits with results within 2 Week(s) from this visit.   Latest known visit with results is:   Admission on 02/20/2023, Discharged on 02/21/2023   Component Date Value Ref Range Status   • WBC 02/21/2023 13.83 (H)  3.40 - 10.80 10*3/mm3 Final   • RBC 02/21/2023 4.69  4.14 - 5.80 10*6/mm3 Final   • Hemoglobin 02/21/2023 14.3  13.0 - 17.7 g/dL Final   • Hematocrit 02/21/2023 39.5  37.5 - 51.0 % Final   • MCV 02/21/2023 84.2  79.0 - 97.0 fL Final   • MCH 02/21/2023 30.5  26.6 - 33.0 pg Final   • MCHC 02/21/2023 36.2 (H)  31.5 - 35.7 g/dL Final   • RDW 02/21/2023 12.3  12.3 - 15.4 % Final   • RDW-SD 02/21/2023 37.1  37.0 - 54.0 fl Final   • MPV 02/21/2023 9.7  6.0 - 12.0 fL Final   • Platelets 02/21/2023 219  140 - 450 10*3/mm3 Final   • Neutrophil % 02/21/2023 73.7  42.7 - 76.0 % Final   • Lymphocyte % 02/21/2023 12.7 (L)  19.6 - 45.3 % Final   • Monocyte % 02/21/2023 8.5  5.0 - 12.0 % Final   • Eosinophil % 02/21/2023 0.0 (L)  0.3 - 6.2 % Final   • Basophil % 02/21/2023 0.5  0.0 - 1.5 % Final   • Immature Grans % 02/21/2023 4.6 (H)  0.0 - 0.5 % Final   • Neutrophils, Absolute 02/21/2023 10.20 (H)  1.70 - 7.00 10*3/mm3 Final   • Lymphocytes,  Absolute 02/21/2023 1.75  0.70 - 3.10 10*3/mm3 Final   • Monocytes, Absolute 02/21/2023 1.18 (H)  0.10 - 0.90 10*3/mm3 Final   • Eosinophils, Absolute 02/21/2023 0.00  0.00 - 0.40 10*3/mm3 Final   • Basophils, Absolute 02/21/2023 0.07  0.00 - 0.20 10*3/mm3 Final   • Immature Grans, Absolute 02/21/2023 0.63 (H)  0.00 - 0.05 10*3/mm3 Final   • nRBC 02/21/2023 0.0  0.0 - 0.2 /100 WBC Final   • Color, UA 02/21/2023 Yellow  Yellow, Straw Final   • Appearance, UA 02/21/2023 Clear  Clear Final   • pH, UA 02/21/2023 6.0  5.0 - 8.0 Final   • Specific Gravity, UA 02/21/2023 1.038 (H)  1.001 - 1.030 Final   • Glucose, UA 02/21/2023 >=1000 mg/dL (3+) (A)  Negative Final   • Ketones, UA 02/21/2023 15 mg/dL (1+) (A)  Negative Final   • Bilirubin, UA 02/21/2023 Negative  Negative Final   • Blood, UA 02/21/2023 Negative  Negative Final   • Protein, UA 02/21/2023 Negative  Negative Final   • Leuk Esterase, UA 02/21/2023 Negative  Negative Final   • Nitrite, UA 02/21/2023 Negative  Negative Final   • Urobilinogen, UA 02/21/2023 1.0 E.U./dL  0.2 - 1.0 E.U./dL Final        No results found.    ASSESSMENT: The patient is a very pleasant 52 y.o. male  with left frontal lobe GBM      PLAN:    1.  Left frontal lobe GBM:  A.  The patient has been doing fairly well at home.  We will continue symptoms management with hospice help.    2.  Brain edema:  A.  The patient's been doing well with the current dose of dexamethasone 6 mg twice daily.  We will continue to wean him off slowly as tolerated.  I suggested to Aron trying to lower the evening dose.    3.  Seizure prophylaxis:  A.  He is off Keppra currently. He denies overt seizure activity but is having increased tremor.     4.  Hypertension:  A.  I will continue lisinopril 20 mg daily. He has been taken off his Norvasc by his PCP secondary to low blood pressure.     5.  Headaches:  A.  Used by his underlying cancer.  B.  We will continue fentanyl patches.    FOLLOW UP: 4 weeks    Shaan CASTELAN  MD Prasanth  3/22/2023

## 2023-03-24 NOTE — TELEPHONE ENCOUNTER
Caller: RADHA    Relationship: [facility]      Who are you requesting to speak with (clinical staff, provider,  specific staff member):       What was the call regarding: [facility] IS TRYING TO CONTACT THE PT, CONFIRMED THE SAME PHONE # THAT WE HAVE, BUT THEY ARE UNABLE TO REACH HIM AND UNABLE TO LEAVE .    Do you require a callback: IF ABLE TO CONTACT PT TO LET HIM KNOW

## 2023-04-13 ENCOUNTER — TELEPHONE (OUTPATIENT)
Dept: ONCOLOGY | Facility: CLINIC | Age: 53
End: 2023-04-13
Payer: OTHER GOVERNMENT

## 2023-04-13 RX ORDER — LEVETIRACETAM 500 MG/1
TABLET ORAL
Qty: 60 TABLET | Refills: 0 | Status: SHIPPED | OUTPATIENT
Start: 2023-04-13

## 2023-04-13 NOTE — TELEPHONE ENCOUNTER
Received call from Sara, RN with hospice that patient has a bad case of thrush. Already using Nystatin swish and swallow.  Discussed with GORDY Moody and ok to send in Diflucan 100mg daily x 7 days as well.  Notified Sara who will call it in.

## 2023-04-13 NOTE — TELEPHONE ENCOUNTER
Provider:  Abdi  Surgery/Procedure:  Crani  Surgery/Procedure Date:  9/21/22  Last visit:   2/13/23  Next visit: Per Dr. Patton's documentation on 2/15/23- Pt was to f/u with him in 4 weeks. Annalee, please schedule.      Reason for call:  Requested Prescriptions     Pending Prescriptions Disp Refills   • levETIRAcetam (KEPPRA) 500 MG tablet [Pharmacy Med Name: LEVETIRACETAM 500MG TAB3 500 Tablet] 60 tablet 0     Sig: TAKE ONE TABLET BY MOUTH TWO TIMES A DAY

## 2023-04-19 NOTE — TELEPHONE ENCOUNTER
Ava called back in.  She said that patient is at EOL and having terminal restlessness.  Asking for morphine and ativan to be given every hour prn at this time.  Discussed with Dr. Rader and ok to do.  PETER Escalante is aware.

## 2023-04-21 NOTE — TELEPHONE ENCOUNTER
HOSPICE NURSE CALLED STATING PT IN A LOT OF PAIN AND VERY RESTLESS SHE WOULD LIKE TO SEE ABOUT INCREASING PAIN MEDS IF YOU CAN GIVE HER A CALL .   ALBERT [phone]

## 2023-04-21 NOTE — TELEPHONE ENCOUNTER
I returned Michelle's call.  She said patient is in pain and the morphine every hour is not helping.  Asking to go up to 10mg every hour on the morphine and needs new script sent to [facility].  Notified Dr. Rader and he is ok with this.

## (undated) DEVICE — SWYNG ® NON-STICK BIPOLAR FORCEPS,      SINGLE-USE, US 19 CM ST, BAYONET,       TIP 1,0 X 6 MM: Brand: SUTTER

## (undated) DEVICE — ADHS SKIN PREMIERPRO EXOFIN TOPICAL HI/VISC .5ML

## (undated) DEVICE — ANTIBACTERIAL UNDYED BRAIDED (POLYGLACTIN 910), SYNTHETIC ABSORBABLE SUTURE: Brand: COATED VICRYL

## (undated) DEVICE — APPL DURAPREP IODOPHOR APL 26ML

## (undated) DEVICE — REMOV ADHS SKIN DETACHOL LIQ .5OZ

## (undated) DEVICE — BLANKT WARM LOWR/BDY 100X120CM

## (undated) DEVICE — SUT MNCRYL PLS ANTIB UD 3/0 PS2 27IN

## (undated) DEVICE — GLV SURG PREMIERPRO MIC LTX PF SZ7.5 BRN

## (undated) DEVICE — TOOL MR8-F2/7TA23 MR8 F2/7CM TAPER 2.3MM: Brand: MIDAS REX MR8

## (undated) DEVICE — CATH IV ANGIOCATH FEP 14GA 1.88IN ORNG

## (undated) DEVICE — DRP MICROSCOPE CLEARLENS 46X120IN LXF

## (undated) DEVICE — SYR CONTRL LUERLOK 10CC

## (undated) DEVICE — Device

## (undated) DEVICE — NDL HYPO ECLPS SFTY 25G 1 1/2IN

## (undated) DEVICE — PK CRANI 10

## (undated) DEVICE — PATIENT RETURN ELECTRODE, SINGLE-USE, CONTACT QUALITY MONITORING, ADULT, WITH 9FT CORD, FOR PATIENTS WEIGING OVER 33LBS. (15KG): Brand: MEGADYNE

## (undated) DEVICE — GLV SURG PREMIERPRO MIC LTX PF SZ6 BRN

## (undated) DEVICE — CRANIOTOMY DRAPE, STERILE: Brand: MEDLINE

## (undated) DEVICE — GAMMEX® NON-LATEX SIZE 7.5, STERILE NEOPRENE POWDER-FREE SURGICAL GLOVE: Brand: GAMMEX

## (undated) DEVICE — DISPOSABLE BIPOLAR FORCEPS 7 3/4" (19.7CM) SCOVILLE BAYONET, INSULATED, 1.5MM TIP AND 12 FT. (3.6M) CABLE: Brand: KIRWAN

## (undated) DEVICE — SUT NUROLON 4/0 TF18 CR8 I8IN C584D

## (undated) DEVICE — TOOL MR8-9AC60M MR8 9CM ACORN 6MM 2FLT: Brand: MIDAS REX MR8

## (undated) DEVICE — SWYNG ® NON-STICK BIPOLAR FORCEPS,      SINGLE-USE, US 21 CM SL, BAYONET,       TIP 1,0 X 6 MM: Brand: SUTTER

## (undated) DEVICE — PENCL ROCKRSWCH MEGADYNE W/HOLSTR 10FT SS

## (undated) DEVICE — SWYNG ® NON-STICK BIPOLAR FORCEPS,      SINGLE-USE, US 19 CM ST, BAYONET,       TIP 0,5 X 6 MM: Brand: SUTTER

## (undated) DEVICE — SHEET, DRAPE, SPLIT, STERILE: Brand: MEDLINE

## (undated) DEVICE — GLV SURG PREMIERPRO MIC LTX PF SZ6.5 BRN

## (undated) DEVICE — INTENDED USE FOR SURGICAL MARKING ON INTACT SKIN, ALSO PROVIDES A PERMANENT METHOD OF IDENTIFYING OBJECTS IN THE OPERATING ROOM: Brand: WRITESITE® REGULAR TIP SKIN MARKER

## (undated) DEVICE — ELECTRD BLD EZ CLN STD 2.5IN

## (undated) DEVICE — DRV BATRY MATRIXPRO STRL

## (undated) DEVICE — SPNG GZ WOVN 4X4IN 12PLY 10/BX STRL

## (undated) DEVICE — SWYNG ® NON-STICK BIPOLAR FORCEPS,      SINGLE-USE, US 24 CM SL, BAYONET,       TIP 1,0 X 6 MM: Brand: SUTTER

## (undated) DEVICE — RUBBERBAND LF STRL PK/2

## (undated) DEVICE — MAYFIELD® DISPOSABLE ADULT SKULL PIN (PLASTIC BASE): Brand: MAYFIELD®

## (undated) DEVICE — COMB BLK 7IN WD

## (undated) DEVICE — NEURO SPONGES: Brand: DEROYAL

## (undated) DEVICE — DRSNG GZ PETROLTM XEROFORM CURAD 1X8IN STRL